# Patient Record
Sex: FEMALE | Race: WHITE | Employment: UNEMPLOYED | URBAN - METROPOLITAN AREA
[De-identification: names, ages, dates, MRNs, and addresses within clinical notes are randomized per-mention and may not be internally consistent; named-entity substitution may affect disease eponyms.]

---

## 2017-01-05 RX ORDER — CITALOPRAM 20 MG/1
TABLET ORAL
Qty: 90 TABLET | Refills: 0 | Status: SHIPPED | OUTPATIENT
Start: 2017-01-05 | End: 2017-04-05

## 2017-02-20 RX ORDER — HYDROCHLOROTHIAZIDE 25 MG/1
TABLET ORAL
Qty: 90 TABLET | Refills: 0 | Status: SHIPPED | OUTPATIENT
Start: 2017-02-20 | End: 2017-05-19

## 2017-02-22 ENCOUNTER — APPOINTMENT (OUTPATIENT)
Dept: LAB | Age: 57
End: 2017-02-22
Attending: FAMILY MEDICINE
Payer: COMMERCIAL

## 2017-02-22 DIAGNOSIS — Z00.00 BLOOD TESTS FOR ROUTINE GENERAL PHYSICAL EXAMINATION: ICD-10-CM

## 2017-02-22 LAB
25-HYDROXYVITAMIN D (TOTAL): 35.1 NG/ML (ref 30–100)
ALBUMIN SERPL-MCNC: 4 G/DL (ref 3.5–4.8)
ALP LIVER SERPL-CCNC: 63 U/L (ref 41–108)
ALT SERPL-CCNC: 42 U/L (ref 14–54)
AST SERPL-CCNC: 24 U/L (ref 15–41)
BILIRUB SERPL-MCNC: 0.6 MG/DL (ref 0.1–2)
BUN BLD-MCNC: 16 MG/DL (ref 8–20)
CALCIUM BLD-MCNC: 9 MG/DL (ref 8.3–10.3)
CHLORIDE: 104 MMOL/L (ref 101–111)
CHOLEST SMN-MCNC: 199 MG/DL (ref ?–200)
CO2: 27 MMOL/L (ref 22–32)
CREAT BLD-MCNC: 0.52 MG/DL (ref 0.55–1.02)
ERYTHROCYTE [DISTWIDTH] IN BLOOD BY AUTOMATED COUNT: 12.2 % (ref 11.5–16)
GLUCOSE BLD-MCNC: 85 MG/DL (ref 70–99)
HCT VFR BLD AUTO: 39.7 % (ref 34–50)
HDLC SERPL-MCNC: 69 MG/DL (ref 45–?)
HDLC SERPL: 2.88 {RATIO} (ref ?–4.44)
HGB BLD-MCNC: 13.5 G/DL (ref 12–16)
LDLC SERPL CALC-MCNC: 115 MG/DL (ref ?–130)
M PROTEIN MFR SERPL ELPH: 7.3 G/DL (ref 6.1–8.3)
MCH RBC QN AUTO: 32.1 PG (ref 27–33.2)
MCHC RBC AUTO-ENTMCNC: 34 G/DL (ref 31–37)
MCV RBC AUTO: 94.5 FL (ref 81–100)
NONHDLC SERPL-MCNC: 130 MG/DL (ref ?–130)
PLATELET # BLD AUTO: 206 10(3)UL (ref 150–450)
POTASSIUM SERPL-SCNC: 3.8 MMOL/L (ref 3.6–5.1)
RBC # BLD AUTO: 4.2 X10(6)UL (ref 3.8–5.1)
RED CELL DISTRIBUTION WIDTH-SD: 42.5 FL (ref 35.1–46.3)
SODIUM SERPL-SCNC: 140 MMOL/L (ref 136–144)
TRIGLYCERIDES: 76 MG/DL (ref ?–150)
TSI SER-ACNC: 2.97 MIU/ML (ref 0.35–5.5)
VLDL: 15 MG/DL (ref 5–40)
WBC # BLD AUTO: 4.6 X10(3) UL (ref 4–13)

## 2017-02-22 PROCEDURE — 36415 COLL VENOUS BLD VENIPUNCTURE: CPT

## 2017-02-22 PROCEDURE — 80053 COMPREHEN METABOLIC PANEL: CPT

## 2017-02-22 PROCEDURE — 82306 VITAMIN D 25 HYDROXY: CPT

## 2017-02-22 PROCEDURE — 85027 COMPLETE CBC AUTOMATED: CPT

## 2017-02-22 PROCEDURE — 80061 LIPID PANEL: CPT

## 2017-02-22 PROCEDURE — 84443 ASSAY THYROID STIM HORMONE: CPT

## 2017-04-06 RX ORDER — CITALOPRAM 20 MG/1
TABLET ORAL
Qty: 90 TABLET | Refills: 0 | Status: SHIPPED | OUTPATIENT
Start: 2017-04-06 | End: 2017-07-07

## 2017-04-27 ENCOUNTER — TELEPHONE (OUTPATIENT)
Dept: FAMILY MEDICINE CLINIC | Facility: CLINIC | Age: 57
End: 2017-04-27

## 2017-05-07 ENCOUNTER — PATIENT MESSAGE (OUTPATIENT)
Dept: FAMILY MEDICINE CLINIC | Facility: CLINIC | Age: 57
End: 2017-05-07

## 2017-05-19 RX ORDER — HYDROCHLOROTHIAZIDE 25 MG/1
TABLET ORAL
Qty: 90 TABLET | Refills: 0 | Status: SHIPPED | OUTPATIENT
Start: 2017-05-19 | End: 2017-08-04

## 2017-07-13 RX ORDER — CITALOPRAM 20 MG/1
TABLET ORAL
Qty: 90 TABLET | Refills: 0 | Status: SHIPPED | OUTPATIENT
Start: 2017-07-13 | End: 2017-11-20

## 2017-07-21 DIAGNOSIS — I10 BENIGN ESSENTIAL HTN: ICD-10-CM

## 2017-07-21 RX ORDER — LISINOPRIL 5 MG/1
5 TABLET ORAL DAILY
Qty: 90 TABLET | Refills: 0 | Status: SHIPPED | OUTPATIENT
Start: 2017-07-21 | End: 2017-08-04

## 2017-08-04 ENCOUNTER — OFFICE VISIT (OUTPATIENT)
Dept: FAMILY MEDICINE CLINIC | Facility: CLINIC | Age: 57
End: 2017-08-04

## 2017-08-04 VITALS
WEIGHT: 230 LBS | HEIGHT: 67 IN | DIASTOLIC BLOOD PRESSURE: 84 MMHG | TEMPERATURE: 99 F | SYSTOLIC BLOOD PRESSURE: 134 MMHG | HEART RATE: 60 BPM | RESPIRATION RATE: 12 BRPM | BODY MASS INDEX: 36.1 KG/M2

## 2017-08-04 DIAGNOSIS — I10 BENIGN ESSENTIAL HTN: Primary | ICD-10-CM

## 2017-08-04 DIAGNOSIS — F32.A DEPRESSION, UNSPECIFIED DEPRESSION TYPE: ICD-10-CM

## 2017-08-04 DIAGNOSIS — F41.1 GENERALIZED ANXIETY DISORDER: ICD-10-CM

## 2017-08-04 PROCEDURE — 99213 OFFICE O/P EST LOW 20 MIN: CPT | Performed by: FAMILY MEDICINE

## 2017-08-04 RX ORDER — HYDROCHLOROTHIAZIDE 25 MG/1
25 TABLET ORAL
Qty: 90 TABLET | Refills: 1 | Status: SHIPPED | OUTPATIENT
Start: 2017-08-04 | End: 2017-11-20

## 2017-08-04 RX ORDER — CITALOPRAM 20 MG/1
20 TABLET ORAL
Qty: 90 TABLET | Refills: 1 | Status: SHIPPED | OUTPATIENT
Start: 2017-08-04 | End: 2017-11-20

## 2017-08-04 RX ORDER — LISINOPRIL 10 MG/1
10 TABLET ORAL DAILY
Qty: 90 TABLET | Refills: 1 | Status: SHIPPED | OUTPATIENT
Start: 2017-08-04 | End: 2017-11-20

## 2017-08-04 NOTE — PROGRESS NOTES
CHIEF COMPLAINT:   Marciana Buerger a 49year old female is here for followup on HTN  HPI:   Marciana Buerger has been doing well since the last visit here. Marciana Buerger  is compliant with hypertensive medication. No chest pain. No dyspnea.

## 2017-11-20 ENCOUNTER — OFFICE VISIT (OUTPATIENT)
Dept: FAMILY MEDICINE CLINIC | Facility: CLINIC | Age: 57
End: 2017-11-20

## 2017-11-20 VITALS
HEIGHT: 67 IN | RESPIRATION RATE: 16 BRPM | TEMPERATURE: 98 F | DIASTOLIC BLOOD PRESSURE: 90 MMHG | SYSTOLIC BLOOD PRESSURE: 154 MMHG | WEIGHT: 238 LBS | BODY MASS INDEX: 37.35 KG/M2 | HEART RATE: 64 BPM

## 2017-11-20 DIAGNOSIS — F41.1 GENERALIZED ANXIETY DISORDER: ICD-10-CM

## 2017-11-20 DIAGNOSIS — Z00.00 BLOOD TESTS FOR ROUTINE GENERAL PHYSICAL EXAMINATION: ICD-10-CM

## 2017-11-20 DIAGNOSIS — I10 BENIGN ESSENTIAL HTN: Primary | ICD-10-CM

## 2017-11-20 DIAGNOSIS — F32.A DEPRESSION, UNSPECIFIED DEPRESSION TYPE: ICD-10-CM

## 2017-11-20 PROCEDURE — 99214 OFFICE O/P EST MOD 30 MIN: CPT | Performed by: FAMILY MEDICINE

## 2017-11-20 RX ORDER — LISINOPRIL AND HYDROCHLOROTHIAZIDE 25; 20 MG/1; MG/1
1 TABLET ORAL DAILY
Qty: 90 TABLET | Refills: 0 | Status: SHIPPED | OUTPATIENT
Start: 2017-11-20 | End: 2018-02-06

## 2017-11-20 RX ORDER — CITALOPRAM 20 MG/1
20 TABLET ORAL
Qty: 90 TABLET | Refills: 1 | Status: SHIPPED | OUTPATIENT
Start: 2017-11-20 | End: 2018-10-20

## 2017-11-20 NOTE — PROGRESS NOTES
CHIEF COMPLAINT:   Samantha Kapadia a 56year old female is here for followup on HTN  HPI:   Samantha Kapadia has been doing well since the last visit here. Samantha Kapadia  is compliant with hypertensive medication. No chest pain. No dyspnea. daily. Increase lisinopril to 20mg - continue HCTZ 25 mg daily. Check labs. Follow up in 3 months.

## 2018-02-03 ENCOUNTER — TELEPHONE (OUTPATIENT)
Dept: FAMILY MEDICINE CLINIC | Facility: CLINIC | Age: 58
End: 2018-02-03

## 2018-02-06 RX ORDER — HYDROCHLOROTHIAZIDE 25 MG/1
25 TABLET ORAL
Qty: 90 TABLET | Refills: 0 | Status: SHIPPED | OUTPATIENT
Start: 2018-02-06 | End: 2018-02-15 | Stop reason: DRUGHIGH

## 2018-02-15 ENCOUNTER — OFFICE VISIT (OUTPATIENT)
Dept: FAMILY MEDICINE CLINIC | Facility: CLINIC | Age: 58
End: 2018-02-15

## 2018-02-15 VITALS
HEIGHT: 67.5 IN | SYSTOLIC BLOOD PRESSURE: 140 MMHG | OXYGEN SATURATION: 99 % | TEMPERATURE: 99 F | HEART RATE: 55 BPM | BODY MASS INDEX: 37.77 KG/M2 | RESPIRATION RATE: 16 BRPM | DIASTOLIC BLOOD PRESSURE: 80 MMHG | WEIGHT: 243.5 LBS

## 2018-02-15 DIAGNOSIS — I10 BENIGN ESSENTIAL HTN: Primary | ICD-10-CM

## 2018-02-15 PROCEDURE — 99213 OFFICE O/P EST LOW 20 MIN: CPT | Performed by: FAMILY MEDICINE

## 2018-02-15 RX ORDER — SENNOSIDES 8.6 MG
650 CAPSULE ORAL EVERY 8 HOURS PRN
COMMUNITY

## 2018-02-15 RX ORDER — LISINOPRIL 20 MG/1
20 TABLET ORAL DAILY
Qty: 90 TABLET | Refills: 1 | Status: SHIPPED | OUTPATIENT
Start: 2018-02-15 | End: 2018-08-15

## 2018-02-15 RX ORDER — LISINOPRIL AND HYDROCHLOROTHIAZIDE 25; 20 MG/1; MG/1
1 TABLET ORAL DAILY
COMMUNITY
End: 2018-02-15

## 2018-02-15 RX ORDER — LISINOPRIL AND HYDROCHLOROTHIAZIDE 25; 20 MG/1; MG/1
1 TABLET ORAL DAILY
Qty: 90 TABLET | Refills: 1 | Status: SHIPPED | OUTPATIENT
Start: 2018-02-15 | End: 2018-08-15

## 2018-02-15 NOTE — PROGRESS NOTES
CHIEF COMPLAINT:   Odalys Bolton a 56year old female is here for followup on HTN  HPI:   Odalys Bolton has been doing well since the last visit here. Odalys Bolton  is compliant with hypertensive medication. No chest pain. No dyspnea.

## 2018-03-29 ENCOUNTER — TELEPHONE (OUTPATIENT)
Dept: FAMILY MEDICINE CLINIC | Facility: CLINIC | Age: 58
End: 2018-03-29

## 2018-03-29 NOTE — TELEPHONE ENCOUNTER
See note below,pt states she has nasal congestion, chills, cough and low grade temp x 48 hrs. Please advise.

## 2018-03-29 NOTE — TELEPHONE ENCOUNTER
Likely viral.  Would suggest she push fluids. Tylenol is okay. Could try Flonase OTC 2 sprays each nostril daily.

## 2018-03-29 NOTE — TELEPHONE ENCOUNTER
Pt would like a call back from a nurse. She is wondering if she has a cold or the flu. Advised Pt she should be seen to determine but declined. She would just like to speak to a nurse. Please advise. Thank you.

## 2018-08-22 NOTE — TELEPHONE ENCOUNTER
LVM on unidentified voice mail box. Is patient taking lisinopril hydrochlorothiazide 20-25 mg or plain lisinopril 20 mg ?

## 2018-08-23 ENCOUNTER — TELEPHONE (OUTPATIENT)
Dept: FAMILY MEDICINE CLINIC | Facility: CLINIC | Age: 58
End: 2018-08-23

## 2018-08-23 DIAGNOSIS — Z12.31 ENCOUNTER FOR SCREENING MAMMOGRAM FOR BREAST CANCER: Primary | ICD-10-CM

## 2018-08-23 RX ORDER — LISINOPRIL AND HYDROCHLOROTHIAZIDE 25; 20 MG/1; MG/1
1 TABLET ORAL DAILY
Qty: 90 TABLET | Refills: 0 | Status: SHIPPED | OUTPATIENT
Start: 2018-08-23 | End: 2018-11-19

## 2018-08-23 RX ORDER — LISINOPRIL 20 MG/1
20 TABLET ORAL DAILY
Qty: 90 TABLET | Refills: 0 | Status: SHIPPED | OUTPATIENT
Start: 2018-08-23 | End: 2018-11-19

## 2018-08-23 NOTE — TELEPHONE ENCOUNTER
Pt aware of mammo order, is aware that she needs a breast exam.  She states she will have Lange Links do this during her visit in Sept.

## 2018-08-23 NOTE — TELEPHONE ENCOUNTER
Okay to order mammogram.  If I have any slots open please change her appointment to my schedule.   Otherwise Lange Ricki can do a breast exam on the patient which comes in for her blood pressure check

## 2018-08-23 NOTE — TELEPHONE ENCOUNTER
See note below, pt states you order her mammo test. Last mammo done 2016 with US left breast bx. What type of mammo to order now and do you want to see pt first for a breast exam first? Pt seeing ARETMIO Pendleton for BP check 9/14/18 as she states she could not

## 2018-08-23 NOTE — TELEPHONE ENCOUNTER
Pt called back to clarify. She is taking both Lisinopril-Hydrochlorothiazide 20-25 MG Oral Tab and lisinopril 20 MG Oral Tab. Pt is out of lisinopril 20mg.

## 2018-09-14 ENCOUNTER — OFFICE VISIT (OUTPATIENT)
Dept: FAMILY MEDICINE CLINIC | Facility: CLINIC | Age: 58
End: 2018-09-14
Payer: COMMERCIAL

## 2018-09-14 VITALS
TEMPERATURE: 99 F | SYSTOLIC BLOOD PRESSURE: 154 MMHG | BODY MASS INDEX: 38.45 KG/M2 | HEIGHT: 67 IN | DIASTOLIC BLOOD PRESSURE: 84 MMHG | RESPIRATION RATE: 12 BRPM | WEIGHT: 245 LBS | HEART RATE: 72 BPM

## 2018-09-14 DIAGNOSIS — I10 BENIGN ESSENTIAL HTN: Primary | ICD-10-CM

## 2018-09-14 PROCEDURE — 99213 OFFICE O/P EST LOW 20 MIN: CPT | Performed by: FAMILY MEDICINE

## 2018-09-14 RX ORDER — AMLODIPINE BESYLATE 2.5 MG/1
2.5 TABLET ORAL DAILY
Qty: 90 TABLET | Refills: 0 | Status: SHIPPED | OUTPATIENT
Start: 2018-09-14 | End: 2018-11-19

## 2018-09-14 NOTE — PATIENT INSTRUCTIONS
Check on insurance coverage for Shingrix. If covered, then ask your insurance if you should do it at the [address] [place] [address] office or pharmacy.

## 2018-09-14 NOTE — PROGRESS NOTES
CHIEF COMPLAINT:   Mike Weiss a 56year old female is here for followup on HTN  HPI:   Mike Weiss has been doing well since the last visit here. Marisandoval Rodneysteven  is compliant with hypertensive medication. No chest pain. No dyspnea.

## 2018-09-17 ENCOUNTER — HOSPITAL ENCOUNTER (OUTPATIENT)
Dept: MAMMOGRAPHY | Facility: HOSPITAL | Age: 58
Discharge: HOME | End: 2018-09-17
Attending: FAMILY MEDICINE
Payer: COMMERCIAL

## 2018-09-17 DIAGNOSIS — Z12.31 ENCOUNTER FOR SCREENING MAMMOGRAM FOR BREAST CANCER: ICD-10-CM

## 2018-09-17 PROCEDURE — 77063 BREAST TOMOSYNTHESIS BI: CPT | Performed by: FAMILY MEDICINE

## 2018-09-17 PROCEDURE — 77067 SCR MAMMO BI INCL CAD: CPT | Performed by: FAMILY MEDICINE

## 2018-10-22 RX ORDER — CITALOPRAM 20 MG/1
20 TABLET ORAL
Qty: 90 TABLET | Refills: 1 | Status: SHIPPED | OUTPATIENT
Start: 2018-10-22 | End: 2019-03-28

## 2018-11-19 ENCOUNTER — OFFICE VISIT (OUTPATIENT)
Dept: FAMILY MEDICINE CLINIC | Facility: CLINIC | Age: 58
End: 2018-11-19
Payer: COMMERCIAL

## 2018-11-19 VITALS
BODY MASS INDEX: 38.77 KG/M2 | WEIGHT: 247 LBS | SYSTOLIC BLOOD PRESSURE: 114 MMHG | DIASTOLIC BLOOD PRESSURE: 80 MMHG | HEART RATE: 72 BPM | TEMPERATURE: 97 F | RESPIRATION RATE: 14 BRPM | HEIGHT: 67 IN

## 2018-11-19 DIAGNOSIS — F41.8 SITUATIONAL ANXIETY: ICD-10-CM

## 2018-11-19 DIAGNOSIS — I10 BENIGN ESSENTIAL HTN: Primary | ICD-10-CM

## 2018-11-19 PROCEDURE — 99213 OFFICE O/P EST LOW 20 MIN: CPT | Performed by: FAMILY MEDICINE

## 2018-11-19 RX ORDER — AMLODIPINE BESYLATE 2.5 MG/1
2.5 TABLET ORAL DAILY
Qty: 90 TABLET | Refills: 1 | Status: SHIPPED | OUTPATIENT
Start: 2018-11-19 | End: 2019-04-01 | Stop reason: DRUGHIGH

## 2018-11-19 RX ORDER — LISINOPRIL AND HYDROCHLOROTHIAZIDE 25; 20 MG/1; MG/1
1 TABLET ORAL DAILY
Qty: 90 TABLET | Refills: 0 | Status: CANCELLED | OUTPATIENT
Start: 2018-11-19

## 2018-11-19 RX ORDER — LISINOPRIL 20 MG/1
20 TABLET ORAL DAILY
Qty: 90 TABLET | Refills: 1 | Status: SHIPPED | OUTPATIENT
Start: 2018-11-19 | End: 2019-06-27

## 2018-11-19 RX ORDER — LISINOPRIL AND HYDROCHLOROTHIAZIDE 25; 20 MG/1; MG/1
1 TABLET ORAL DAILY
Qty: 90 TABLET | Refills: 1 | Status: SHIPPED | OUTPATIENT
Start: 2018-11-19 | End: 2019-06-27

## 2018-11-19 RX ORDER — ALPRAZOLAM 0.25 MG/1
TABLET ORAL
Qty: 30 TABLET | Refills: 0 | Status: SHIPPED | OUTPATIENT
Start: 2018-11-19 | End: 2019-04-01

## 2018-11-19 RX ORDER — LISINOPRIL 20 MG/1
20 TABLET ORAL DAILY
Qty: 90 TABLET | Refills: 0 | Status: CANCELLED | OUTPATIENT
Start: 2018-11-19

## 2018-11-19 NOTE — PROGRESS NOTES
CHIEF COMPLAINT:   Laurie Pendleton a 66year old female is here for followup on HTN  HPI:   Laurie Pendleton has been doing well since the last visit here. Laurie Pendleton  is compliant with hypertensive medication. No chest pain. No dyspnea.

## 2018-12-12 ENCOUNTER — TELEPHONE (OUTPATIENT)
Dept: FAMILY MEDICINE CLINIC | Facility: CLINIC | Age: 58
End: 2018-12-12

## 2018-12-12 DIAGNOSIS — Z00.00 BLOOD TESTS FOR ROUTINE GENERAL PHYSICAL EXAMINATION: Primary | ICD-10-CM

## 2018-12-12 NOTE — TELEPHONE ENCOUNTER
[facility] called to ask that orders from 17 be reentered b/c they are . Pt is coming in tomorrow zandern to have labs done.

## 2018-12-13 ENCOUNTER — LABORATORY ENCOUNTER (OUTPATIENT)
Dept: LAB | Age: 58
End: 2018-12-13
Attending: FAMILY MEDICINE
Payer: COMMERCIAL

## 2018-12-13 DIAGNOSIS — Z00.00 BLOOD TESTS FOR ROUTINE GENERAL PHYSICAL EXAMINATION: ICD-10-CM

## 2018-12-13 PROCEDURE — 82306 VITAMIN D 25 HYDROXY: CPT | Performed by: FAMILY MEDICINE

## 2018-12-13 PROCEDURE — 36415 COLL VENOUS BLD VENIPUNCTURE: CPT | Performed by: FAMILY MEDICINE

## 2018-12-13 PROCEDURE — 80050 GENERAL HEALTH PANEL: CPT | Performed by: FAMILY MEDICINE

## 2018-12-13 PROCEDURE — 80061 LIPID PANEL: CPT | Performed by: FAMILY MEDICINE

## 2019-03-12 ENCOUNTER — APPOINTMENT (OUTPATIENT)
Dept: CT IMAGING | Facility: HOSPITAL | Age: 59
End: 2019-03-12
Attending: EMERGENCY MEDICINE
Payer: COMMERCIAL

## 2019-03-12 ENCOUNTER — HOSPITAL ENCOUNTER (EMERGENCY)
Facility: HOSPITAL | Age: 59
Discharge: HOME | End: 2019-03-12
Attending: EMERGENCY MEDICINE
Payer: COMMERCIAL

## 2019-03-12 VITALS
DIASTOLIC BLOOD PRESSURE: 82 MMHG | BODY MASS INDEX: 38 KG/M2 | HEART RATE: 81 BPM | OXYGEN SATURATION: 96 % | WEIGHT: 240 LBS | RESPIRATION RATE: 16 BRPM | SYSTOLIC BLOOD PRESSURE: 133 MMHG | TEMPERATURE: 98 F

## 2019-03-12 DIAGNOSIS — R51.9 HEADACHE DISORDER: Primary | ICD-10-CM

## 2019-03-12 LAB
ALBUMIN SERPL-MCNC: 3.7 G/DL (ref 3.4–5)
ALBUMIN/GLOB SERPL: 1.1 {RATIO} (ref 1–2)
ALP LIVER SERPL-CCNC: 71 U/L (ref 46–118)
ALT SERPL-CCNC: 46 U/L (ref 13–56)
ANION GAP SERPL CALC-SCNC: 8 MMOL/L (ref 0–18)
AST SERPL-CCNC: 28 U/L (ref 15–37)
BASOPHILS # BLD AUTO: 0.05 X10(3) UL (ref 0–0.2)
BASOPHILS NFR BLD AUTO: 0.6 %
BILIRUB SERPL-MCNC: 0.7 MG/DL (ref 0.1–2)
BUN BLD-MCNC: 25 MG/DL (ref 7–18)
BUN/CREAT SERPL: 32.1 (ref 10–20)
CALCIUM BLD-MCNC: 9.7 MG/DL (ref 8.5–10.1)
CHLORIDE SERPL-SCNC: 107 MMOL/L (ref 98–107)
CO2 SERPL-SCNC: 24 MMOL/L (ref 21–32)
CREAT BLD-MCNC: 0.78 MG/DL (ref 0.55–1.02)
DEPRECATED RDW RBC AUTO: 40.7 FL (ref 35.1–46.3)
EOSINOPHIL # BLD AUTO: 0.12 X10(3) UL (ref 0–0.7)
EOSINOPHIL NFR BLD AUTO: 1.5 %
ERYTHROCYTE [DISTWIDTH] IN BLOOD BY AUTOMATED COUNT: 11.9 % (ref 11–15)
GLOBULIN PLAS-MCNC: 3.5 G/DL (ref 2.8–4.4)
GLUCOSE BLD-MCNC: 141 MG/DL (ref 70–99)
HCT VFR BLD AUTO: 38.6 % (ref 35–48)
HGB BLD-MCNC: 13 G/DL (ref 12–16)
IMM GRANULOCYTES # BLD AUTO: 0.02 X10(3) UL (ref 0–1)
IMM GRANULOCYTES NFR BLD: 0.3 %
LIPASE SERPL-CCNC: 186 U/L (ref 73–393)
LYMPHOCYTES # BLD AUTO: 3.41 X10(3) UL (ref 1–4)
LYMPHOCYTES NFR BLD AUTO: 43 %
M PROTEIN MFR SERPL ELPH: 7.2 G/DL (ref 6.4–8.2)
MCH RBC QN AUTO: 31.3 PG (ref 26–34)
MCHC RBC AUTO-ENTMCNC: 33.7 G/DL (ref 31–37)
MCV RBC AUTO: 92.8 FL (ref 80–100)
MONOCYTES # BLD AUTO: 0.65 X10(3) UL (ref 0.1–1)
MONOCYTES NFR BLD AUTO: 8.2 %
NEUTROPHILS # BLD AUTO: 3.68 X10 (3) UL (ref 1.5–7.7)
NEUTROPHILS # BLD AUTO: 3.68 X10(3) UL (ref 1.5–7.7)
NEUTROPHILS NFR BLD AUTO: 46.4 %
OSMOLALITY SERPL CALC.SUM OF ELEC: 295 MOSM/KG (ref 275–295)
PLATELET # BLD AUTO: 258 10(3)UL (ref 150–450)
POTASSIUM SERPL-SCNC: 3.5 MMOL/L (ref 3.5–5.1)
RBC # BLD AUTO: 4.16 X10(6)UL (ref 3.8–5.3)
SODIUM SERPL-SCNC: 139 MMOL/L (ref 136–145)
WBC # BLD AUTO: 7.9 X10(3) UL (ref 4–11)

## 2019-03-12 PROCEDURE — 85025 COMPLETE CBC W/AUTO DIFF WBC: CPT

## 2019-03-12 PROCEDURE — 96361 HYDRATE IV INFUSION ADD-ON: CPT

## 2019-03-12 PROCEDURE — 70496 CT ANGIOGRAPHY HEAD: CPT | Performed by: EMERGENCY MEDICINE

## 2019-03-12 PROCEDURE — 99285 EMERGENCY DEPT VISIT HI MDM: CPT

## 2019-03-12 PROCEDURE — 80053 COMPREHEN METABOLIC PANEL: CPT

## 2019-03-12 PROCEDURE — 93010 ELECTROCARDIOGRAM REPORT: CPT

## 2019-03-12 PROCEDURE — 83690 ASSAY OF LIPASE: CPT

## 2019-03-12 PROCEDURE — 96374 THER/PROPH/DIAG INJ IV PUSH: CPT

## 2019-03-12 PROCEDURE — 93005 ELECTROCARDIOGRAM TRACING: CPT

## 2019-03-12 PROCEDURE — 96375 TX/PRO/DX INJ NEW DRUG ADDON: CPT

## 2019-03-12 RX ORDER — DIPHENHYDRAMINE HYDROCHLORIDE 50 MG/ML
25 INJECTION INTRAMUSCULAR; INTRAVENOUS ONCE
Status: COMPLETED | OUTPATIENT
Start: 2019-03-12 | End: 2019-03-12

## 2019-03-12 RX ORDER — MORPHINE SULFATE 4 MG/ML
4 INJECTION, SOLUTION INTRAMUSCULAR; INTRAVENOUS EVERY 30 MIN PRN
Status: DISCONTINUED | OUTPATIENT
Start: 2019-03-12 | End: 2019-03-13

## 2019-03-12 RX ORDER — ONDANSETRON 2 MG/ML
4 INJECTION INTRAMUSCULAR; INTRAVENOUS ONCE
Status: COMPLETED | OUTPATIENT
Start: 2019-03-12 | End: 2019-03-12

## 2019-03-12 RX ORDER — METOCLOPRAMIDE 10 MG/1
10 TABLET ORAL 3 TIMES DAILY PRN
Qty: 20 TABLET | Refills: 0 | Status: SHIPPED | OUTPATIENT
Start: 2019-03-12 | End: 2019-04-11

## 2019-03-12 RX ORDER — METOCLOPRAMIDE HYDROCHLORIDE 5 MG/ML
10 INJECTION INTRAMUSCULAR; INTRAVENOUS ONCE
Status: COMPLETED | OUTPATIENT
Start: 2019-03-12 | End: 2019-03-12

## 2019-03-13 NOTE — ED NOTES
Patient ambulatory with a steady gait for discharge. Verbalized understanding of discharge instructions.

## 2019-03-13 NOTE — ED PROVIDER NOTES
Patient Seen in: [facility] Emergency Department    History   Patient presents with:  Headache (neurologic)    Stated Complaint: HA/VOMITING     HPI    This is a 61-year-old female who arrives here with complaints of headache she described as sudden is in no respiratory distress. The patient is not septic or toxic    HEENT: Atraumatic, conjunctiva are not pale. There is no icterus. Oral mucosa Is wet. No facial trauma. The neck is supple.     LUNGS: Clear to auscultation, there is no wheezing or re INDICATIONS:  HA/VOMITING  TECHNIQUE:  Unenhanced followed by contrast enhanced multislice CT angiography of the brain vasculature using non-ionic contrast. Multiplanar 3D reformatted imaging as well as multiplanar MIP images were obtained.  Dose reduction she had gotten small dose of morphine, she was still nauseous therefore Reglan and Benadryl was given when I went back and reexamined her she has no headache she has no neck stiffness her cranial nerves are grossly intact her muscle strength is grossly int Danielle Garcia [address] [place] [address]          Sanford Gibbs MD  [address] [phone]              Medications Prescribed:  Current Discharge Medication List    START taking these medications    Metoclopramide HCl 10 MG Oral Ta

## 2019-03-13 NOTE — ED INITIAL ASSESSMENT (HPI)
Pt here via EMS with c/o headache and vomiting, sudden onset tonight while at [employer]. Denies any head injury or trauma. Denies history of migraines. Patient did receive 4 mg zofran ODT.

## 2019-03-14 LAB
ATRIAL RATE: 80 BPM
P AXIS: 52 DEGREES
P-R INTERVAL: 180 MS
Q-T INTERVAL: 408 MS
QRS DURATION: 96 MS
QTC CALCULATION (BEZET): 470 MS
R AXIS: -9 DEGREES
T AXIS: 55 DEGREES
VENTRICULAR RATE: 80 BPM

## 2019-03-18 ENCOUNTER — APPOINTMENT (OUTPATIENT)
Dept: LAB | Age: 59
End: 2019-03-18
Attending: FAMILY MEDICINE
Payer: COMMERCIAL

## 2019-03-18 ENCOUNTER — OFFICE VISIT (OUTPATIENT)
Dept: FAMILY MEDICINE CLINIC | Facility: CLINIC | Age: 59
End: 2019-03-18
Payer: COMMERCIAL

## 2019-03-18 VITALS
HEART RATE: 64 BPM | SYSTOLIC BLOOD PRESSURE: 158 MMHG | DIASTOLIC BLOOD PRESSURE: 90 MMHG | HEIGHT: 67 IN | WEIGHT: 253 LBS | RESPIRATION RATE: 12 BRPM | BODY MASS INDEX: 39.71 KG/M2

## 2019-03-18 DIAGNOSIS — I49.3 PVC'S (PREMATURE VENTRICULAR CONTRACTIONS): ICD-10-CM

## 2019-03-18 DIAGNOSIS — R94.31 ABNORMAL EKG: ICD-10-CM

## 2019-03-18 DIAGNOSIS — I10 UNCONTROLLED HYPERTENSION: ICD-10-CM

## 2019-03-18 DIAGNOSIS — I10 HYPERTENSION NOT AT GOAL: ICD-10-CM

## 2019-03-18 DIAGNOSIS — R01.1 SYSTOLIC MURMUR: ICD-10-CM

## 2019-03-18 DIAGNOSIS — R51.9 ACUTE NONINTRACTABLE HEADACHE, UNSPECIFIED HEADACHE TYPE: ICD-10-CM

## 2019-03-18 DIAGNOSIS — I10 BENIGN ESSENTIAL HTN: Primary | ICD-10-CM

## 2019-03-18 PROCEDURE — 82088 ASSAY OF ALDOSTERONE: CPT | Performed by: FAMILY MEDICINE

## 2019-03-18 PROCEDURE — 99214 OFFICE O/P EST MOD 30 MIN: CPT | Performed by: FAMILY MEDICINE

## 2019-03-18 PROCEDURE — 84244 ASSAY OF RENIN: CPT | Performed by: FAMILY MEDICINE

## 2019-03-18 PROCEDURE — 36415 COLL VENOUS BLD VENIPUNCTURE: CPT | Performed by: FAMILY MEDICINE

## 2019-03-18 PROCEDURE — 83835 ASSAY OF METANEPHRINES: CPT | Performed by: FAMILY MEDICINE

## 2019-03-18 RX ORDER — AMLODIPINE BESYLATE 5 MG/1
7.5 TABLET ORAL DAILY
Qty: 135 TABLET | Refills: 0 | Status: SHIPPED | OUTPATIENT
Start: 2019-03-18 | End: 2019-06-10

## 2019-03-18 NOTE — PROGRESS NOTES
CHIEF COMPLAINT:   Laurie Pendleton a 66year old female is here for followup on HTN/ER follow up  HPI:   Laurie Pendleton has been doing well since the last visit here. Laurie Pendleton  is compliant with hypertensive medication. No chest pain. KIDNEY W DOPPLER (WDI=80443/02572)  CARD MONITOR HOLTER 24 HOUR (CPT=93225)  CARD ECHO 2D DOPPLER (CPT=93306)   Reviewed ER work up - CTA brain unremarkable. PVCs on EKG. Also question of septal infarct.   Patient reports she was in [place] when she prese

## 2019-03-21 LAB
ALDOSTERONE/RENIN ACTIVITY RATIO: 5.4 RATIO
ALDOSTERONE: 8.7 NG/DL
RENIN ACTIVITY: 1.6 NG/ML/HR

## 2019-03-22 LAB
METANEPHRINE: <0.1 NMOL/L
NORMETANEPHRINE: 0.35 NMOL/L

## 2019-03-25 ENCOUNTER — HOSPITAL ENCOUNTER (OUTPATIENT)
Dept: CV DIAGNOSTICS | Facility: HOSPITAL | Age: 59
Discharge: HOME | End: 2019-03-25
Attending: FAMILY MEDICINE
Payer: COMMERCIAL

## 2019-03-25 DIAGNOSIS — I49.3 PVC'S (PREMATURE VENTRICULAR CONTRACTIONS): ICD-10-CM

## 2019-03-25 DIAGNOSIS — I10 UNCONTROLLED HYPERTENSION: ICD-10-CM

## 2019-03-25 DIAGNOSIS — R01.1 SYSTOLIC MURMUR: ICD-10-CM

## 2019-03-25 PROCEDURE — 93226 XTRNL ECG REC<48 HR SCAN A/R: CPT | Performed by: FAMILY MEDICINE

## 2019-03-25 PROCEDURE — 93306 TTE W/DOPPLER COMPLETE: CPT | Performed by: FAMILY MEDICINE

## 2019-03-25 PROCEDURE — 93227 XTRNL ECG REC<48 HR R&I: CPT | Performed by: FAMILY MEDICINE

## 2019-03-25 PROCEDURE — 93225 XTRNL ECG REC<48 HRS REC: CPT | Performed by: FAMILY MEDICINE

## 2019-03-27 ENCOUNTER — TELEPHONE (OUTPATIENT)
Dept: FAMILY MEDICINE CLINIC | Facility: CLINIC | Age: 59
End: 2019-03-27

## 2019-03-27 ENCOUNTER — HOSPITAL ENCOUNTER (OUTPATIENT)
Dept: ULTRASOUND IMAGING | Age: 59
Discharge: HOME | End: 2019-03-27
Attending: FAMILY MEDICINE
Payer: COMMERCIAL

## 2019-03-27 DIAGNOSIS — I10 UNCONTROLLED HYPERTENSION: ICD-10-CM

## 2019-03-27 PROCEDURE — 76775 US EXAM ABDO BACK WALL LIM: CPT | Performed by: FAMILY MEDICINE

## 2019-03-27 PROCEDURE — 93975 VASCULAR STUDY: CPT | Performed by: FAMILY MEDICINE

## 2019-03-27 NOTE — TELEPHONE ENCOUNTER
Pt states 3 weeks ago she was in the hospital. Pt states ever since then she has had a lot of anxiety and has been using her xanax a lot more then she normally did.  She used to only need to take it for flying but she seems like she needs to take it more an

## 2019-03-27 NOTE — TELEPHONE ENCOUNTER
Pt needs appt to discuss this further. She saw dressler 3/18 and I see no mention of this. With her out this week, on call would advise her to be seen. Please offer her appt with dressler when she is back next week.   If pt is unwilling to wait that long

## 2019-03-28 PROBLEM — G44.53 PRIMARY THUNDERCLAP HEADACHE: Status: ACTIVE | Noted: 2019-03-28

## 2019-03-28 NOTE — PROGRESS NOTES
The patient is here for headaches. The patient had a headache on 03/12/19, the patient has had no headaches since this episode.

## 2019-03-28 NOTE — PROGRESS NOTES
[facility]   Neurology; INITIAL CLINIC VISIT  3/28/2019, 10:08 AM     Emmanuelle Baeza Patient Status:  No patient class for patient encounter    10/30/1960 MRN [id number]   Location [facility], [address] [place] [address] Other in her brother and mother. Mother had hepatic aneurysm , brother had AVM, passed away,       SOCIAL HISTORY:   reports that  has never smoked. she has never used smokeless tobacco. She reports that she drinks alcohol.  She reports that she does not or lower extremities  NEURO: see HPI;  PSYCHE: no symptoms of depression or anxiety  HEMATOLOGY:  denies bruising or excessive bleeding  ENDOCRINE: denies excessive thirst or urination; denies unexpected wt gain or wt loss  ALLERGY/IMM.: denies food or sea negative, Tandem walk is normal             IMAGING:  CTA brain:  CONCLUSION:       1. No acute intracranial abnormality identified. 2. No evidence of intracranial aneurysm, flow-limiting stenosis, or focal arterial occlusion.          Dictated by: Christel Dancer

## 2019-04-01 NOTE — PROGRESS NOTES
Samantha Kapadia is a 66year old female. CHIEF COMPLAINT   Follow up on HTN and anxiety  HPI:   BPs at home good.   Dr. Karly Ashby increased citalopram. Dr. Karly Ashby felt headache most likely migraine but checking MRI/MRA also - awaiting approval. No further h GENERAL HEALTH: feels well otherwise  SKIN: denies any unusual skin lesions or rashes  RESPIRATORY: denies shortness of breath with exertion  CARDIOVASCULAR: denies chest pain on exertion  GI: denies abdominal pain and denies heartburn  NEURO: as above

## 2019-04-03 ENCOUNTER — TELEPHONE (OUTPATIENT)
Dept: NEUROLOGY | Facility: CLINIC | Age: 59
End: 2019-04-03

## 2019-04-03 NOTE — TELEPHONE ENCOUNTER
Carotids FTG(49818) Denied     Denial Reason:    Based on [employer] [facility] Guidelines, we cannot approve this request. Your records show that  your doctor suspects that you have a problem with the blood vessels in your neck that supply blood  to your h

## 2019-04-05 ENCOUNTER — TELEPHONE (OUTPATIENT)
Dept: NEUROLOGY | Facility: CLINIC | Age: 59
End: 2019-04-05

## 2019-04-15 NOTE — TELEPHONE ENCOUNTER
Authorizing Provider Encounter Provider   MD Rojas Linares MD   Medication Detail     Medication Quantity Refills Start End   Citalopram Hydrobromide 20 MG Oral Tab 180 tablet 3 3/28/2019    Sig:   Take 2 tablets (40 mg total) by mouth once daily.

## 2019-04-15 NOTE — TELEPHONE ENCOUNTER
I believe the patient's neurologist sent this medication to her pharmacy for 1 year. Please double check with the pharmacy. The neurologist increased it to 40 mg daily.

## 2019-04-17 ENCOUNTER — OFFICE VISIT (OUTPATIENT)
Dept: CARDIOLOGY | Age: 59
End: 2019-04-17

## 2019-04-17 VITALS
BODY MASS INDEX: 37.13 KG/M2 | HEIGHT: 68 IN | HEART RATE: 92 BPM | SYSTOLIC BLOOD PRESSURE: 116 MMHG | DIASTOLIC BLOOD PRESSURE: 82 MMHG | WEIGHT: 245 LBS

## 2019-04-17 DIAGNOSIS — I10 BENIGN ESSENTIAL HTN: ICD-10-CM

## 2019-04-17 DIAGNOSIS — G47.33 OBSTRUCTIVE SLEEP APNEA SYNDROME: ICD-10-CM

## 2019-04-17 DIAGNOSIS — R94.31 ABNORMAL ECG: ICD-10-CM

## 2019-04-17 DIAGNOSIS — R00.2 PALPITATIONS: Primary | ICD-10-CM

## 2019-04-17 PROCEDURE — 3074F SYST BP LT 130 MM HG: CPT | Performed by: INTERNAL MEDICINE

## 2019-04-17 PROCEDURE — 3079F DIAST BP 80-89 MM HG: CPT | Performed by: INTERNAL MEDICINE

## 2019-04-17 PROCEDURE — 99244 OFF/OP CNSLTJ NEW/EST MOD 40: CPT | Performed by: INTERNAL MEDICINE

## 2019-04-17 RX ORDER — LISINOPRIL AND HYDROCHLOROTHIAZIDE 25; 20 MG/1; MG/1
1 TABLET ORAL
COMMUNITY
Start: 2018-11-19

## 2019-04-17 RX ORDER — AMLODIPINE BESYLATE 5 MG/1
7.5 TABLET ORAL
COMMUNITY
Start: 2019-03-18

## 2019-04-17 RX ORDER — LISINOPRIL 20 MG/1
20 TABLET ORAL
COMMUNITY
Start: 2018-11-19

## 2019-04-17 RX ORDER — CITALOPRAM 20 MG/1
40 TABLET ORAL
COMMUNITY
Start: 2019-03-28

## 2019-04-17 RX ORDER — ALPRAZOLAM 0.25 MG/1
TABLET ORAL
Refills: 1 | COMMUNITY
Start: 2019-04-02

## 2019-04-17 ASSESSMENT — ENCOUNTER SYMPTOMS
SNORING: 1
WEIGHT GAIN: 1

## 2019-04-18 RX ORDER — CITALOPRAM 20 MG/1
20 TABLET ORAL
Qty: 90 TABLET | Refills: 1 | OUTPATIENT
Start: 2019-04-18

## 2019-04-19 ENCOUNTER — TELEPHONE (OUTPATIENT)
Dept: NEUROLOGY | Facility: CLINIC | Age: 59
End: 2019-04-19

## 2019-04-22 ENCOUNTER — TELEPHONE (OUTPATIENT)
Dept: NEUROLOGY | Facility: CLINIC | Age: 59
End: 2019-04-22

## 2019-04-22 ENCOUNTER — OFFICE VISIT (OUTPATIENT)
Dept: NEUROLOGY | Facility: CLINIC | Age: 59
End: 2019-04-22
Payer: COMMERCIAL

## 2019-04-22 VITALS
SYSTOLIC BLOOD PRESSURE: 126 MMHG | RESPIRATION RATE: 18 BRPM | DIASTOLIC BLOOD PRESSURE: 72 MMHG | BODY MASS INDEX: 39 KG/M2 | WEIGHT: 246 LBS | HEART RATE: 74 BPM

## 2019-04-22 DIAGNOSIS — G44.53 PRIMARY THUNDERCLAP HEADACHE: Primary | ICD-10-CM

## 2019-04-22 PROCEDURE — 99214 OFFICE O/P EST MOD 30 MIN: CPT | Performed by: OTHER

## 2019-04-22 RX ORDER — METOCLOPRAMIDE 10 MG/1
TABLET ORAL
Qty: 30 TABLET | Refills: 0 | Status: SHIPPED | OUTPATIENT
Start: 2019-04-22 | End: 2021-01-19 | Stop reason: ALTCHOICE

## 2019-04-22 NOTE — PROGRESS NOTES
[facility]   Neurology; follow up CLINIC VISIT  2019    Candice Chaudhary Patient Status:  No patient class for patient encounter    10/30/1960 MRN [id number]   [facility] 12/15/2016    Performed by Almaz Tuttle MD at [facility]   • NEEDLE BIOPSY LEFT         FAMILY HISTORY:  family history includes Other in her brother and mother.    Mother had hepatic aneurysm , brother had AVM, passed away,       SOCIAL HI incontinence  MUSCULOSKELETAL: no joint complaints in  upper or lower extremities  NEURO: see HPI;  PSYCHE: no symptoms of depression or anxiety  HEMATOLOGY:  denies bruising or excessive bleeding  ENDOCRINE: denies excessive thirst or urination; denies un Normal FTN and HTS;   Gait: Normal based,  Romberg sign is negative, Tandem walk is normal             IMAGING:  CTA brain:  CONCLUSION:       1. No acute intracranial abnormality identified.      2. No evidence of intracranial aneurysm, flow-limiting steno [place]  4/22/2019

## 2019-04-22 NOTE — PROGRESS NOTES
The patient is here for a follow-up for headaches. The patient states she no longer has any headaches.

## 2019-04-23 ENCOUNTER — TELEPHONE (OUTPATIENT)
Dept: NEUROLOGY | Facility: CLINIC | Age: 59
End: 2019-04-23

## 2019-04-23 NOTE — TELEPHONE ENCOUNTER
Per Dr Catalino Ríos:  Tell her I reviewed film and report of MRI, MRA brain, normal, incidental findings of sinusitis, need to told to ENT or primary about sinusitis,        Routing comment      Left message on patient identified voicemail (ok with [employer]

## 2019-04-23 NOTE — TELEPHONE ENCOUNTER
Pt brought in CD of results. CD brought to Radiology and downloaded. Pt declined to have CD returned to them.

## 2019-04-24 NOTE — TELEPHONE ENCOUNTER
Called patient and informed her of the below information;    Per Dr Dianelys Farmer:  Tell her I reviewed film and report of MRI, MRA brain, normal, incidental findings of sinusitis, need to told to ENT or primary about sinusitis,             Copy sent to oleksandr

## 2019-04-25 ENCOUNTER — TELEPHONE (OUTPATIENT)
Dept: CARDIOLOGY | Age: 59
End: 2019-04-25

## 2019-04-29 ENCOUNTER — TELEPHONE (OUTPATIENT)
Dept: NEUROLOGY | Facility: CLINIC | Age: 59
End: 2019-04-29

## 2019-05-28 ENCOUNTER — APPOINTMENT (OUTPATIENT)
Dept: SLEEP CENTER | Facility: HOSPITAL | Age: 59
End: 2019-05-28
Attending: INTERNAL MEDICINE
Payer: COMMERCIAL

## 2019-05-29 ENCOUNTER — OFFICE VISIT (OUTPATIENT)
Dept: SLEEP CENTER | Facility: HOSPITAL | Age: 59
End: 2019-05-29
Attending: INTERNAL MEDICINE
Payer: COMMERCIAL

## 2019-05-29 PROCEDURE — 95806 SLEEP STUDY UNATT&RESP EFFT: CPT

## 2019-06-01 NOTE — PROCEDURES
[address] [address],[facility]       Accredited by the [facility] of Sleep Medicine (AASM)    PATIENT'S NAME:        Carlota Johnson  ATTENDING PHYSICIAN:   Gordon Swift M.D.   REFERRING PHYSICIAN:     PATIENT ACCOUNT #: was 4.4, supine index of 7.9 compared to a non-supine index of 2.3, and an SaO2 hilda of 87%. DIAGNOSIS:  Other sleep disorder not due to a substance or known physiological condition (ICD code F51.8). RECOMMENDATIONS:    1.     At the request of the r

## 2019-06-03 RX ORDER — AMLODIPINE BESYLATE 2.5 MG/1
TABLET ORAL
Qty: 90 TABLET | Refills: 1 | OUTPATIENT
Start: 2019-06-03

## 2019-06-10 RX ORDER — AMLODIPINE BESYLATE 5 MG/1
7.5 TABLET ORAL DAILY
Qty: 135 TABLET | Refills: 0 | Status: SHIPPED | OUTPATIENT
Start: 2019-06-10 | End: 2019-09-07

## 2019-06-14 ENCOUNTER — MED REC SCAN ONLY (OUTPATIENT)
Dept: FAMILY MEDICINE CLINIC | Facility: CLINIC | Age: 59
End: 2019-06-14

## 2019-06-27 RX ORDER — LISINOPRIL 20 MG/1
TABLET ORAL
Qty: 90 TABLET | Refills: 0 | Status: SHIPPED | OUTPATIENT
Start: 2019-06-27 | End: 2019-10-02

## 2019-06-27 RX ORDER — LISINOPRIL AND HYDROCHLOROTHIAZIDE 25; 20 MG/1; MG/1
TABLET ORAL
Qty: 90 TABLET | Refills: 0 | Status: SHIPPED | OUTPATIENT
Start: 2019-06-27 | End: 2019-10-02

## 2019-07-24 ENCOUNTER — APPOINTMENT (OUTPATIENT)
Dept: CARDIOLOGY | Age: 59
End: 2019-07-24

## 2019-08-08 ENCOUNTER — OFFICE VISIT (OUTPATIENT)
Dept: FAMILY MEDICINE CLINIC | Facility: CLINIC | Age: 59
End: 2019-08-08
Payer: COMMERCIAL

## 2019-08-08 VITALS
DIASTOLIC BLOOD PRESSURE: 70 MMHG | HEIGHT: 68 IN | WEIGHT: 243 LBS | BODY MASS INDEX: 36.83 KG/M2 | TEMPERATURE: 98 F | RESPIRATION RATE: 12 BRPM | SYSTOLIC BLOOD PRESSURE: 126 MMHG | HEART RATE: 72 BPM

## 2019-08-08 DIAGNOSIS — Z11.51 SCREENING FOR HPV (HUMAN PAPILLOMAVIRUS): ICD-10-CM

## 2019-08-08 DIAGNOSIS — Z00.00 BLOOD TESTS FOR ROUTINE GENERAL PHYSICAL EXAMINATION: ICD-10-CM

## 2019-08-08 DIAGNOSIS — Z13.820 SCREENING FOR OSTEOPOROSIS: ICD-10-CM

## 2019-08-08 DIAGNOSIS — Z12.39 SCREENING FOR BREAST CANCER: ICD-10-CM

## 2019-08-08 DIAGNOSIS — Z13.89 SCREENING FOR GENITOURINARY CONDITION: ICD-10-CM

## 2019-08-08 DIAGNOSIS — E55.9 VITAMIN D DEFICIENCY: ICD-10-CM

## 2019-08-08 DIAGNOSIS — Z00.00 ROUTINE GENERAL MEDICAL EXAMINATION AT A HEALTH CARE FACILITY: Primary | ICD-10-CM

## 2019-08-08 DIAGNOSIS — Z12.4 PAP SMEAR FOR CERVICAL CANCER SCREENING: ICD-10-CM

## 2019-08-08 PROBLEM — G44.53 PRIMARY THUNDERCLAP HEADACHE: Status: RESOLVED | Noted: 2019-03-28 | Resolved: 2019-08-08

## 2019-08-08 PROCEDURE — 99396 PREV VISIT EST AGE 40-64: CPT | Performed by: FAMILY MEDICINE

## 2019-08-08 RX ORDER — MELATONIN
1000 DAILY
COMMUNITY

## 2019-08-08 NOTE — PROGRESS NOTES
CHIEF COMPLAINT       Annual Physical Exam  HPI:   Neida Garrido is a 66year old female who presents for a complete physical exam.   Home BPs good. LEEP done about 1997. Normal pap since then. Last pap 2016.     Wt Readings from Last 6 Encounters: daily.  ) Disp: 180 tablet Rfl: 3   Acetaminophen ER (TYLENOL 8 HOUR ARTHRITIS PAIN) 650 MG Oral Tab CR Take 650 mg by mouth every 8 (eight) hours as needed for Pain.  Two tabs daily Disp:  Rfl:    Multiple Vitamins-Minerals (MULTIVITAMIN OR) Take  by mouth clear  NECK: supple,no adenopathy,no bruits  CHEST: no chest tenderness  BREAST: no dominant or suspicious mass, no nipple discharge  LUNGS: clear to auscultation  CARDIO: RRR without murmur  GI: good BS's,no masses, HSM or tenderness  :introitus is norm

## 2019-08-13 LAB — HPV MRNA E6/E7: NOT DETECTED

## 2019-09-08 RX ORDER — AMLODIPINE BESYLATE 5 MG/1
7.5 TABLET ORAL DAILY
Qty: 135 TABLET | Refills: 0 | Status: SHIPPED | OUTPATIENT
Start: 2019-09-08 | End: 2019-12-12

## 2019-10-02 RX ORDER — LISINOPRIL AND HYDROCHLOROTHIAZIDE 25; 20 MG/1; MG/1
TABLET ORAL
Qty: 90 TABLET | Refills: 0 | Status: SHIPPED | OUTPATIENT
Start: 2019-10-02 | End: 2019-12-30

## 2019-10-02 RX ORDER — LISINOPRIL 20 MG/1
TABLET ORAL
Qty: 90 TABLET | Refills: 0 | Status: SHIPPED | OUTPATIENT
Start: 2019-10-02 | End: 2019-12-30

## 2019-11-01 ENCOUNTER — TELEPHONE (OUTPATIENT)
Dept: FAMILY MEDICINE CLINIC | Facility: CLINIC | Age: 59
End: 2019-11-01

## 2019-11-01 ENCOUNTER — PATIENT MESSAGE (OUTPATIENT)
Dept: FAMILY MEDICINE CLINIC | Facility: CLINIC | Age: 59
End: 2019-11-01

## 2019-11-01 DIAGNOSIS — E78.5 HYPERLIPIDEMIA, UNSPECIFIED HYPERLIPIDEMIA TYPE: Primary | ICD-10-CM

## 2019-11-01 DIAGNOSIS — Z79.899 ON STATIN THERAPY: ICD-10-CM

## 2019-11-01 RX ORDER — ATORVASTATIN CALCIUM 10 MG/1
10 TABLET, FILM COATED ORAL NIGHTLY
Qty: 30 TABLET | Refills: 1 | Status: SHIPPED | OUTPATIENT
Start: 2019-11-01 | End: 2019-11-24

## 2019-11-01 NOTE — TELEPHONE ENCOUNTER
Discussed this message w pt at time of call to her w lab results and nicolle RUBIO comments/recommendations-see that result note.

## 2019-11-25 RX ORDER — ATORVASTATIN CALCIUM 10 MG/1
TABLET, FILM COATED ORAL
Qty: 30 TABLET | Refills: 2 | Status: SHIPPED | OUTPATIENT
Start: 2019-11-25 | End: 2020-03-07

## 2019-12-12 RX ORDER — AMLODIPINE BESYLATE 5 MG/1
7.5 TABLET ORAL DAILY
Qty: 135 TABLET | Refills: 0 | Status: SHIPPED | OUTPATIENT
Start: 2019-12-12 | End: 2020-04-24

## 2019-12-30 RX ORDER — LISINOPRIL AND HYDROCHLOROTHIAZIDE 25; 20 MG/1; MG/1
TABLET ORAL
Qty: 90 TABLET | Refills: 0 | Status: SHIPPED | OUTPATIENT
Start: 2019-12-30 | End: 2020-03-29

## 2019-12-30 RX ORDER — LISINOPRIL 20 MG/1
TABLET ORAL
Qty: 90 TABLET | Refills: 0 | Status: SHIPPED | OUTPATIENT
Start: 2019-12-30 | End: 2020-03-29

## 2020-02-26 NOTE — TELEPHONE ENCOUNTER
Please call pt to schedule anxiety and HTN med check with Jean Pierre Jarquin. LOV: 8/8/19 asked to return in 6 months    Thank you.

## 2020-03-06 NOTE — TELEPHONE ENCOUNTER
Left voicemail for patient to call back to schedule this appointment.      Sent unable to reach letter via my chart

## 2020-03-07 RX ORDER — ATORVASTATIN CALCIUM 10 MG/1
TABLET, FILM COATED ORAL
Qty: 90 TABLET | Refills: 0 | Status: SHIPPED | OUTPATIENT
Start: 2020-03-07 | End: 2020-05-31

## 2020-03-29 RX ORDER — LISINOPRIL AND HYDROCHLOROTHIAZIDE 25; 20 MG/1; MG/1
TABLET ORAL
Qty: 90 TABLET | Refills: 0 | Status: SHIPPED | OUTPATIENT
Start: 2020-03-29 | End: 2020-06-22

## 2020-03-29 RX ORDER — LISINOPRIL 20 MG/1
TABLET ORAL
Qty: 90 TABLET | Refills: 0 | Status: SHIPPED | OUTPATIENT
Start: 2020-03-29 | End: 2020-06-22

## 2020-04-01 NOTE — TELEPHONE ENCOUNTER
Medication: CITALOPRAM HYDROBROMIDE 20 MG     Date of last refill: 3/28/19 (#180/3)  [place]    Last office visit: 4/22/19  Due back to clinic per last office note:  Not stated in [address] [place] [address] note   Date next office visit sche

## 2020-04-08 NOTE — TELEPHONE ENCOUNTER
Please call patient to schedule a telephone visit for follow up on medication. Have her check BP for several days prior to the phone call visit. Please route back to me to fill medication once she is scheduled. Thanks.

## 2020-04-21 NOTE — TELEPHONE ENCOUNTER
LVM to call back. Schedule televisit for med check. Have pt track her BP readings for several days before televisit. Let Josie Spence know when scheduled.

## 2020-04-24 RX ORDER — AMLODIPINE BESYLATE 5 MG/1
7.5 TABLET ORAL DAILY
Qty: 135 TABLET | Refills: 0 | Status: SHIPPED | OUTPATIENT
Start: 2020-04-24 | End: 2020-07-21

## 2020-04-27 ENCOUNTER — VIRTUAL PHONE E/M (OUTPATIENT)
Dept: FAMILY MEDICINE CLINIC | Facility: CLINIC | Age: 60
End: 2020-04-27
Payer: COMMERCIAL

## 2020-04-27 DIAGNOSIS — E78.00 HYPERCHOLESTEREMIA: ICD-10-CM

## 2020-04-27 DIAGNOSIS — I10 BENIGN ESSENTIAL HTN: Primary | ICD-10-CM

## 2020-04-27 PROCEDURE — 99213 OFFICE O/P EST LOW 20 MIN: CPT | Performed by: FAMILY MEDICINE

## 2020-04-27 RX ORDER — CITALOPRAM 20 MG/1
TABLET ORAL
Qty: 90 TABLET | Refills: 0 | COMMUNITY
Start: 2020-04-27

## 2020-04-27 NOTE — PROGRESS NOTES
Virtual Telephone Check-In    Siddhartha Mcmahon verbally consents to a Virtual/Telephone Check-In visit on 04/27/20. Patient understands and accepts financial responsibility for any deductible, co-insurance and/or co-pays associated with this service.

## 2020-05-31 RX ORDER — ATORVASTATIN CALCIUM 10 MG/1
TABLET, FILM COATED ORAL
Qty: 90 TABLET | Refills: 0 | Status: SHIPPED | OUTPATIENT
Start: 2020-05-31 | End: 2020-08-24

## 2020-06-22 RX ORDER — LISINOPRIL 20 MG/1
TABLET ORAL
Qty: 90 TABLET | Refills: 0 | Status: SHIPPED | OUTPATIENT
Start: 2020-06-22 | End: 2020-09-15

## 2020-06-22 RX ORDER — LISINOPRIL AND HYDROCHLOROTHIAZIDE 25; 20 MG/1; MG/1
TABLET ORAL
Qty: 90 TABLET | Refills: 0 | Status: SHIPPED | OUTPATIENT
Start: 2020-06-22 | End: 2020-09-15

## 2020-07-21 RX ORDER — AMLODIPINE BESYLATE 5 MG/1
TABLET ORAL
Qty: 135 TABLET | Refills: 0 | Status: SHIPPED | OUTPATIENT
Start: 2020-07-21 | End: 2020-10-21

## 2020-08-05 ENCOUNTER — HOSPITAL ENCOUNTER (OUTPATIENT)
Dept: BONE DENSITY | Age: 60
Discharge: HOME | End: 2020-08-05
Attending: FAMILY MEDICINE
Payer: COMMERCIAL

## 2020-08-05 ENCOUNTER — HOSPITAL ENCOUNTER (OUTPATIENT)
Dept: MAMMOGRAPHY | Age: 60
Discharge: HOME | End: 2020-08-05
Attending: FAMILY MEDICINE
Payer: COMMERCIAL

## 2020-08-05 DIAGNOSIS — Z12.39 SCREENING FOR BREAST CANCER: ICD-10-CM

## 2020-08-05 DIAGNOSIS — Z13.820 SCREENING FOR OSTEOPOROSIS: ICD-10-CM

## 2020-08-05 PROCEDURE — 77063 BREAST TOMOSYNTHESIS BI: CPT | Performed by: FAMILY MEDICINE

## 2020-08-05 PROCEDURE — 77067 SCR MAMMO BI INCL CAD: CPT | Performed by: FAMILY MEDICINE

## 2020-08-05 PROCEDURE — 77080 DXA BONE DENSITY AXIAL: CPT | Performed by: FAMILY MEDICINE

## 2020-08-24 RX ORDER — ATORVASTATIN CALCIUM 10 MG/1
TABLET, FILM COATED ORAL
Qty: 90 TABLET | Refills: 0 | Status: SHIPPED | OUTPATIENT
Start: 2020-08-24 | End: 2020-12-14

## 2020-09-15 RX ORDER — LISINOPRIL 20 MG/1
TABLET ORAL
Qty: 90 TABLET | Refills: 0 | Status: SHIPPED | OUTPATIENT
Start: 2020-09-15 | End: 2020-12-17

## 2020-09-15 RX ORDER — LISINOPRIL AND HYDROCHLOROTHIAZIDE 25; 20 MG/1; MG/1
TABLET ORAL
Qty: 90 TABLET | Refills: 0 | Status: SHIPPED | OUTPATIENT
Start: 2020-09-15 | End: 2020-12-17

## 2020-10-21 RX ORDER — AMLODIPINE BESYLATE 5 MG/1
TABLET ORAL
Qty: 45 TABLET | Refills: 0 | Status: SHIPPED | OUTPATIENT
Start: 2020-10-21 | End: 2020-12-14

## 2020-11-17 RX ORDER — ATORVASTATIN CALCIUM 10 MG/1
TABLET, FILM COATED ORAL
Qty: 90 TABLET | Refills: 0 | OUTPATIENT
Start: 2020-11-17

## 2020-11-17 RX ORDER — AMLODIPINE BESYLATE 5 MG/1
TABLET ORAL
Qty: 45 TABLET | Refills: 0 | OUTPATIENT
Start: 2020-11-17

## 2020-12-08 ENCOUNTER — PATIENT MESSAGE (OUTPATIENT)
Dept: FAMILY MEDICINE CLINIC | Facility: CLINIC | Age: 60
End: 2020-12-08

## 2020-12-08 DIAGNOSIS — Z12.83 SKIN CANCER SCREENING: Primary | ICD-10-CM

## 2020-12-08 NOTE — TELEPHONE ENCOUNTER
From: Adela Riddle  To: Irwin Ervin PA-C  Sent: 12/8/2020 10:56 AM CST  Subject: Referral Request    Hi-can you recommend a dermatologist to see. I have a few moles and skin tags that need to be looked at. Thank you.

## 2020-12-14 RX ORDER — ATORVASTATIN CALCIUM 10 MG/1
TABLET, FILM COATED ORAL
Qty: 90 TABLET | Refills: 0 | Status: SHIPPED | OUTPATIENT
Start: 2020-12-14 | End: 2021-03-09

## 2020-12-14 RX ORDER — AMLODIPINE BESYLATE 5 MG/1
TABLET ORAL
Qty: 45 TABLET | Refills: 0 | Status: SHIPPED | OUTPATIENT
Start: 2020-12-14 | End: 2021-01-07

## 2020-12-17 RX ORDER — LISINOPRIL AND HYDROCHLOROTHIAZIDE 25; 20 MG/1; MG/1
TABLET ORAL
Qty: 90 TABLET | Refills: 0 | Status: SHIPPED | OUTPATIENT
Start: 2020-12-17 | End: 2021-04-05

## 2020-12-17 RX ORDER — LISINOPRIL 20 MG/1
TABLET ORAL
Qty: 90 TABLET | Refills: 0 | Status: SHIPPED | OUTPATIENT
Start: 2020-12-17 | End: 2021-04-05

## 2021-01-07 RX ORDER — AMLODIPINE BESYLATE 5 MG/1
7.5 TABLET ORAL DAILY
Qty: 135 TABLET | Refills: 0 | Status: SHIPPED | OUTPATIENT
Start: 2021-01-07 | End: 2021-04-05

## 2021-01-07 RX ORDER — AMLODIPINE BESYLATE 5 MG/1
TABLET ORAL
Qty: 45 TABLET | Refills: 0 | Status: SHIPPED | OUTPATIENT
Start: 2021-01-07 | End: 2021-01-07

## 2021-01-19 ENCOUNTER — OFFICE VISIT (OUTPATIENT)
Dept: FAMILY MEDICINE CLINIC | Facility: CLINIC | Age: 61
End: 2021-01-19
Payer: COMMERCIAL

## 2021-01-19 ENCOUNTER — TELEPHONE (OUTPATIENT)
Dept: FAMILY MEDICINE CLINIC | Facility: CLINIC | Age: 61
End: 2021-01-19

## 2021-01-19 VITALS
DIASTOLIC BLOOD PRESSURE: 74 MMHG | RESPIRATION RATE: 16 BRPM | WEIGHT: 254 LBS | HEART RATE: 84 BPM | HEIGHT: 68 IN | SYSTOLIC BLOOD PRESSURE: 132 MMHG | BODY MASS INDEX: 38.49 KG/M2

## 2021-01-19 DIAGNOSIS — Z12.31 ENCOUNTER FOR SCREENING MAMMOGRAM FOR MALIGNANT NEOPLASM OF BREAST: ICD-10-CM

## 2021-01-19 DIAGNOSIS — Z00.00 BLOOD TESTS FOR ROUTINE GENERAL PHYSICAL EXAMINATION: ICD-10-CM

## 2021-01-19 DIAGNOSIS — Z00.00 ROUTINE GENERAL MEDICAL EXAMINATION AT A HEALTH CARE FACILITY: Primary | ICD-10-CM

## 2021-01-19 DIAGNOSIS — Z11.51 SCREENING FOR HPV (HUMAN PAPILLOMAVIRUS): ICD-10-CM

## 2021-01-19 DIAGNOSIS — Z13.89 SCREENING FOR GENITOURINARY CONDITION: ICD-10-CM

## 2021-01-19 DIAGNOSIS — Z12.4 PAP SMEAR FOR CERVICAL CANCER SCREENING: ICD-10-CM

## 2021-01-19 PROCEDURE — 3008F BODY MASS INDEX DOCD: CPT | Performed by: FAMILY MEDICINE

## 2021-01-19 PROCEDURE — 3078F DIAST BP <80 MM HG: CPT | Performed by: FAMILY MEDICINE

## 2021-01-19 PROCEDURE — 3075F SYST BP GE 130 - 139MM HG: CPT | Performed by: FAMILY MEDICINE

## 2021-01-19 PROCEDURE — 99396 PREV VISIT EST AGE 40-64: CPT | Performed by: FAMILY MEDICINE

## 2021-01-19 NOTE — PROGRESS NOTES
CHIEF COMPLAINT       Annual Physical Exam  HPI:   Sussy Keys is a 64year old female who presents for a complete physical exam.   Flu shot at [facility].    Pap 8/2019. Hx of abnormal Pap 10 years ago - Leep at that time.    Wt Readings from Last 6 Enco Take 1,000 Units by mouth daily. Takes 4,000 IU by mouth daily      • ALPRAZolam 0.25 MG Oral Tab 1 po BID prn anxiety. No driving and no alcohol.  30 tablet 1   • Acetaminophen ER (TYLENOL 8 HOUR ARTHRITIS PAIN) 650 MG Oral Tab CR Take 650 mg by mouth james nourished,in no apparent distress  SKIN: no rashes,no suspicious lesions  HEENT: atraumatic, normocephalic,ears clear.   Throat exam deferred due to mask  EYES:PERRLA, EOMI,conjunctiva are clear  NECK: supple,no adenopathy,no bruits  BREAST: no dominant or

## 2021-01-19 NOTE — PATIENT INSTRUCTIONS
On Dec. 11, a COVID-19 vaccine by [employer] received FDA emergency use authorization in the [place] for ages 12 and up. Other vaccines are also in development. At [facility], your safety and well-being is our top priority.  We are follow

## 2021-01-20 NOTE — TELEPHONE ENCOUNTER
Pt informed of recommendation and she voiced understanding. Per pt's request to send Yancy Snider Dressler's note and recommendation and Dr. Jo Ann Thomson. Via [employer].

## 2021-01-20 NOTE — TELEPHONE ENCOUNTER
I saw patient for a physical today. We discussed the fact that she has a heart murmur with echo in 2019.   I reviewed her echo tonight which showed grade 1 diastolic dysfunction but otherwise fairly normal.  She saw Dr. Jenny East (cardiology) at that time to

## 2021-01-21 LAB — HPV MRNA E6/E7: NOT DETECTED

## 2021-02-25 NOTE — TELEPHONE ENCOUNTER
LOV 12/06/2016 [FreeTextEntry3] : Left lower extremity sclerotherapy.\par \par Venous insufficiency.  Diffuse, painful right lower extremity small branch varicose veins with limb pain, swelling, itching, burning, tenderness and cramping.\par \par Ms. AARON ROACH is a 68 year year old F  with left lower extremity symptomatic small branch varicose veins.   The patient presented to the office for localized sclerotherapy injections.  After explaining risks and benefits, informed consent was obtained.  All questions answered.\par \par The patient was brought into the examination room and placed supine on procedure table.  Left leg for sclerotherapy treatment is cleaned with 70% isopropyl alcohol.  Sclerosant was mixed using 2 mL of 0.9% Sodium Chloride Injection and 2 mL of 1% polidocanol (Asclera) using filtered needle into sterile 5 mL syringe; 4 mL of 0.5% Asclera injected.  Filtered needle removed from syringe and sterile 30 G ½ inch needle is attached to syringe with sclerosant.  Using a 30 gauge needle, the sclerosant was directly injected into the symptomatic, superficial small branch varicose veins.   Injections were performed on left lower extremity and the veins were mostly localized to the   [].  At completion, treated area was wiped with dry gauze and thigh high 20-30 mmHg compression stockings placed, to be worn x 72 hours.  Patient tolerated the procedure well with no complications.\par Reviewed with the patient post procedure, sclerotherapy, instructions:  Patient instructed to wear compression stocking continuously for 3 days (72 hours) following the procedure and may take the stocking off for daily shower.  Walk for 15-20 minutes immediately following the procedure and daily for the next few days.  Avoid heavy exercise, sunbathing, hot baths/sauna, extended periods of sitting or standing, such as long plane flights for 2-3 days after treatment. Do not be alarmed if you see bruises, brown spots, black streaks, and lumps.  These are part of the healing process, and most will resolve with time.  It can take several weeks to see final results and additional treatments may be required.  Registered nurse advised patient to call the office should they have any questions or concerns following sclerotherapy treatment.  All questions answered.  \par \par \par \par

## 2021-03-09 RX ORDER — ATORVASTATIN CALCIUM 10 MG/1
TABLET, FILM COATED ORAL
Qty: 90 TABLET | Refills: 1 | Status: SHIPPED | OUTPATIENT
Start: 2021-03-09 | End: 2021-09-11

## 2021-03-30 RX ORDER — ALPRAZOLAM 0.25 MG/1
TABLET ORAL
Qty: 30 TABLET | Refills: 0 | Status: SHIPPED | OUTPATIENT
Start: 2021-03-30

## 2021-04-05 RX ORDER — AMLODIPINE BESYLATE 5 MG/1
7.5 TABLET ORAL DAILY
Qty: 135 TABLET | Refills: 0 | Status: SHIPPED | OUTPATIENT
Start: 2021-04-05 | End: 2021-06-30

## 2021-04-05 RX ORDER — LISINOPRIL AND HYDROCHLOROTHIAZIDE 25; 20 MG/1; MG/1
TABLET ORAL
Qty: 90 TABLET | Refills: 0 | Status: SHIPPED | OUTPATIENT
Start: 2021-04-05 | End: 2021-06-30

## 2021-04-05 RX ORDER — LISINOPRIL 20 MG/1
TABLET ORAL
Qty: 90 TABLET | Refills: 0 | Status: SHIPPED | OUTPATIENT
Start: 2021-04-05 | End: 2021-06-30

## 2021-06-30 RX ORDER — LISINOPRIL 20 MG/1
TABLET ORAL
Qty: 90 TABLET | Refills: 0 | Status: SHIPPED | OUTPATIENT
Start: 2021-06-30 | End: 2021-09-21

## 2021-06-30 RX ORDER — AMLODIPINE BESYLATE 5 MG/1
7.5 TABLET ORAL DAILY
Qty: 135 TABLET | Refills: 0 | Status: SHIPPED | OUTPATIENT
Start: 2021-06-30 | End: 2021-09-21

## 2021-06-30 RX ORDER — LISINOPRIL AND HYDROCHLOROTHIAZIDE 25; 20 MG/1; MG/1
TABLET ORAL
Qty: 90 TABLET | Refills: 0 | Status: SHIPPED | OUTPATIENT
Start: 2021-06-30 | End: 2021-09-21

## 2021-08-10 ENCOUNTER — OFFICE VISIT (OUTPATIENT)
Dept: FAMILY MEDICINE CLINIC | Facility: CLINIC | Age: 61
End: 2021-08-10
Payer: COMMERCIAL

## 2021-08-10 VITALS
HEIGHT: 67.75 IN | HEART RATE: 88 BPM | WEIGHT: 259 LBS | BODY MASS INDEX: 39.71 KG/M2 | DIASTOLIC BLOOD PRESSURE: 72 MMHG | RESPIRATION RATE: 16 BRPM | SYSTOLIC BLOOD PRESSURE: 124 MMHG | TEMPERATURE: 99 F

## 2021-08-10 DIAGNOSIS — M25.562 ACUTE PAIN OF LEFT KNEE: Primary | ICD-10-CM

## 2021-08-10 DIAGNOSIS — R79.89 ELEVATED TSH: ICD-10-CM

## 2021-08-10 DIAGNOSIS — R74.8 ELEVATED LIVER ENZYMES: ICD-10-CM

## 2021-08-10 PROCEDURE — 99214 OFFICE O/P EST MOD 30 MIN: CPT | Performed by: FAMILY MEDICINE

## 2021-08-10 PROCEDURE — 3078F DIAST BP <80 MM HG: CPT | Performed by: FAMILY MEDICINE

## 2021-08-10 PROCEDURE — 3008F BODY MASS INDEX DOCD: CPT | Performed by: FAMILY MEDICINE

## 2021-08-10 PROCEDURE — 3074F SYST BP LT 130 MM HG: CPT | Performed by: FAMILY MEDICINE

## 2021-08-10 RX ORDER — B-COMPLEX WITH VITAMIN C
TABLET ORAL
COMMUNITY

## 2021-08-10 RX ORDER — ASPIRIN 81 MG/1
81 TABLET ORAL DAILY
COMMUNITY

## 2021-08-10 NOTE — PROGRESS NOTES
Rubens Schultz is a 64year old female. CHIEF COMPLAINT   Left knee pain  HPI:   Patient complaining of left knee pain for the last 10 days. No specific injury. No pain at rest.  Hurts to walk.   Started in the back of the knee but now the entire kne Hydrobromide 20 MG Oral Tab 1 po once daily 90 tablet 0   • Omega-3 Fatty Acids (FISH OIL BURP-LESS OR) Take by mouth. • Vitamin D3 1000 units Oral Tab Take 1,000 Units by mouth daily.  Takes 4,000 IU by mouth daily      • Acetaminophen ER (TYLENOL 8 HO

## 2021-08-11 ENCOUNTER — HOSPITAL ENCOUNTER (OUTPATIENT)
Dept: GENERAL RADIOLOGY | Age: 61
Discharge: HOME | End: 2021-08-11
Attending: FAMILY MEDICINE
Payer: COMMERCIAL

## 2021-08-11 DIAGNOSIS — Z01.89 ENCOUNTER FOR LOWER EXTREMITY COMPARISON IMAGING STUDY: ICD-10-CM

## 2021-08-11 DIAGNOSIS — M25.562 ACUTE PAIN OF LEFT KNEE: ICD-10-CM

## 2021-08-11 PROCEDURE — 73562 X-RAY EXAM OF KNEE 3: CPT | Performed by: FAMILY MEDICINE

## 2021-08-11 PROCEDURE — 73564 X-RAY EXAM KNEE 4 OR MORE: CPT | Performed by: FAMILY MEDICINE

## 2021-09-02 ENCOUNTER — HOSPITAL ENCOUNTER (OUTPATIENT)
Dept: MAMMOGRAPHY | Age: 61
Discharge: HOME | End: 2021-09-02
Attending: FAMILY MEDICINE
Payer: COMMERCIAL

## 2021-09-02 DIAGNOSIS — Z12.31 ENCOUNTER FOR SCREENING MAMMOGRAM FOR MALIGNANT NEOPLASM OF BREAST: ICD-10-CM

## 2021-09-02 PROCEDURE — 77063 BREAST TOMOSYNTHESIS BI: CPT | Performed by: FAMILY MEDICINE

## 2021-09-02 PROCEDURE — 77067 SCR MAMMO BI INCL CAD: CPT | Performed by: FAMILY MEDICINE

## 2021-09-11 RX ORDER — ATORVASTATIN CALCIUM 10 MG/1
TABLET, FILM COATED ORAL
Qty: 30 TABLET | Refills: 1 | Status: SHIPPED | OUTPATIENT
Start: 2021-09-11 | End: 2021-10-14

## 2021-09-11 NOTE — TELEPHONE ENCOUNTER
Refilled for 30.   Sent [employer] message reminding patient to schedule her 6 month return visit that was requested by Meron Ralph on 1/19/21

## 2021-09-21 RX ORDER — AMLODIPINE BESYLATE 5 MG/1
7.5 TABLET ORAL DAILY
Qty: 135 TABLET | Refills: 0 | Status: SHIPPED | OUTPATIENT
Start: 2021-09-21 | End: 2021-12-13

## 2021-09-21 RX ORDER — LISINOPRIL 20 MG/1
TABLET ORAL
Qty: 90 TABLET | Refills: 0 | Status: SHIPPED | OUTPATIENT
Start: 2021-09-21 | End: 2021-12-13

## 2021-09-21 RX ORDER — LISINOPRIL AND HYDROCHLOROTHIAZIDE 25; 20 MG/1; MG/1
TABLET ORAL
Qty: 90 TABLET | Refills: 0 | Status: SHIPPED | OUTPATIENT
Start: 2021-09-21 | End: 2021-12-13

## 2021-09-21 NOTE — TELEPHONE ENCOUNTER
LOV: 8/10/21 (acute) 1/19/21 (CPX)   Last Refill: 6/30/21, 90 days, 0 RF  Next OV: N/A    Sent [facility] message to schedule a medication follow up. Protocol passed.

## 2021-10-14 RX ORDER — ATORVASTATIN CALCIUM 10 MG/1
TABLET, FILM COATED ORAL
Qty: 60 TABLET | Refills: 0 | Status: SHIPPED | OUTPATIENT
Start: 2021-10-14 | End: 2021-11-11

## 2021-11-02 ENCOUNTER — MED REC SCAN ONLY (OUTPATIENT)
Dept: FAMILY MEDICINE CLINIC | Facility: CLINIC | Age: 61
End: 2021-11-02

## 2021-11-11 RX ORDER — ATORVASTATIN CALCIUM 10 MG/1
10 TABLET, FILM COATED ORAL NIGHTLY
Qty: 90 TABLET | Refills: 0 | Status: SHIPPED | OUTPATIENT
Start: 2021-11-11

## 2021-11-11 NOTE — TELEPHONE ENCOUNTER
LOV: 8/10/21 (acute)   Last Refill: 10/14/21, #60, 0 RF  Next OV: N/A    Please authorize if acceptable. Thank you!

## 2021-12-13 RX ORDER — AMLODIPINE BESYLATE 5 MG/1
7.5 TABLET ORAL DAILY
Qty: 45 TABLET | Refills: 0 | Status: SHIPPED | OUTPATIENT
Start: 2021-12-13 | End: 2022-01-06

## 2021-12-13 RX ORDER — LISINOPRIL 20 MG/1
TABLET ORAL
Qty: 30 TABLET | Refills: 0 | Status: SHIPPED | OUTPATIENT
Start: 2021-12-13 | End: 2022-01-06

## 2021-12-13 RX ORDER — LISINOPRIL AND HYDROCHLOROTHIAZIDE 25; 20 MG/1; MG/1
TABLET ORAL
Qty: 30 TABLET | Refills: 0 | Status: SHIPPED | OUTPATIENT
Start: 2021-12-13 | End: 2022-01-06

## 2021-12-13 NOTE — TELEPHONE ENCOUNTER
LOV: 1/19/21 (CPX)   Last Refill: 9/21/21 (all three), 90 days, 0 RF  Next OV: n/a    Protocol passed. [facility] sent to pt to schedule annual in January.

## 2022-01-06 RX ORDER — AMLODIPINE BESYLATE 5 MG/1
TABLET ORAL
Qty: 45 TABLET | Refills: 0 | Status: SHIPPED | OUTPATIENT
Start: 2022-01-06 | End: 2022-02-03

## 2022-01-06 RX ORDER — LISINOPRIL 20 MG/1
TABLET ORAL
Qty: 30 TABLET | Refills: 0 | Status: SHIPPED | OUTPATIENT
Start: 2022-01-06 | End: 2022-02-03

## 2022-01-06 RX ORDER — LISINOPRIL AND HYDROCHLOROTHIAZIDE 25; 20 MG/1; MG/1
TABLET ORAL
Qty: 30 TABLET | Refills: 0 | Status: SHIPPED | OUTPATIENT
Start: 2022-01-06 | End: 2022-02-03

## 2022-02-03 RX ORDER — AMLODIPINE BESYLATE 5 MG/1
7.5 TABLET ORAL DAILY
Qty: 135 TABLET | Refills: 0 | Status: SHIPPED | OUTPATIENT
Start: 2022-02-03 | End: 2022-03-06

## 2022-02-03 RX ORDER — LISINOPRIL 20 MG/1
TABLET ORAL
Qty: 30 TABLET | Refills: 0 | Status: SHIPPED | OUTPATIENT
Start: 2022-02-03

## 2022-02-03 RX ORDER — AMLODIPINE BESYLATE 5 MG/1
TABLET ORAL
Qty: 45 TABLET | Refills: 0 | Status: SHIPPED | OUTPATIENT
Start: 2022-02-03 | End: 2022-02-03

## 2022-02-03 RX ORDER — LISINOPRIL AND HYDROCHLOROTHIAZIDE 25; 20 MG/1; MG/1
TABLET ORAL
Qty: 30 TABLET | Refills: 0 | Status: SHIPPED | OUTPATIENT
Start: 2022-02-03

## 2022-02-03 NOTE — TELEPHONE ENCOUNTER
Fax received from pharmacy requesting a 90 day prescription instead of a 30 days. Per Giorgio Roman PA-C-sly to fill for 90 days.

## 2022-02-03 NOTE — TELEPHONE ENCOUNTER
LOV: 8/10/21 (acute)     Last Refill:   AMLODIPINE 5 MG Oral Tab, 1/6/22, #45, 0 RF    LISINOPRIL-HYDROCHLOROTHIAZIDE 20-25 MG Oral Tab, 1/6/22, #30, 0 RF    LISINOPRIL 20 MG Oral Tab, 1/6/22, #30, 0 RF    Next OV: n/a     Protocol passed.

## 2022-02-24 RX ORDER — ALPRAZOLAM 0.25 MG/1
TABLET ORAL
Qty: 30 TABLET | Refills: 0 | Status: SHIPPED | OUTPATIENT
Start: 2022-02-24

## 2022-02-24 NOTE — TELEPHONE ENCOUNTER
LOV: 08/10/2021    NOV: 04/11/2022    LF:  03/30/21, #30, ref 0    Order pended, #30, ref 0, please advise.

## 2022-02-24 NOTE — TELEPHONE ENCOUNTER
Pt called to request a refill on     ALPRAZolam 0.25 MG Oral Tab    Be sent to    [facility] [address] [facility] [employer] - [address] [facility], [place] [address] [phone], [facility] [address] [place] [address]     Pt was informed that it can take 1-3 days for request to be seen and processed and was informed to reach out to pharmacy.     Pt is completely out of meds

## 2022-03-06 RX ORDER — AMLODIPINE BESYLATE 5 MG/1
TABLET ORAL
Qty: 45 TABLET | Refills: 0 | Status: SHIPPED | OUTPATIENT
Start: 2022-03-06 | End: 2022-03-08

## 2022-03-08 RX ORDER — AMLODIPINE BESYLATE 5 MG/1
7.5 TABLET ORAL DAILY
Qty: 135 TABLET | Refills: 0 | Status: SHIPPED | OUTPATIENT
Start: 2022-03-08

## 2022-03-08 NOTE — TELEPHONE ENCOUNTER
Fax received from pharmacy that the patient's insurance requires a 90 day supply. Please see pended.

## 2022-04-11 ENCOUNTER — OFFICE VISIT (OUTPATIENT)
Dept: FAMILY MEDICINE CLINIC | Facility: CLINIC | Age: 62
End: 2022-04-11
Payer: COMMERCIAL

## 2022-04-11 VITALS
WEIGHT: 259.63 LBS | SYSTOLIC BLOOD PRESSURE: 130 MMHG | HEART RATE: 92 BPM | BODY MASS INDEX: 39.35 KG/M2 | HEIGHT: 68 IN | TEMPERATURE: 97 F | RESPIRATION RATE: 18 BRPM | DIASTOLIC BLOOD PRESSURE: 76 MMHG

## 2022-04-11 DIAGNOSIS — Z12.31 BREAST CANCER SCREENING BY MAMMOGRAM: ICD-10-CM

## 2022-04-11 DIAGNOSIS — Z00.00 ROUTINE GENERAL MEDICAL EXAMINATION AT A HEALTH CARE FACILITY: Primary | ICD-10-CM

## 2022-04-11 DIAGNOSIS — Z00.00 BLOOD TESTS FOR ROUTINE GENERAL PHYSICAL EXAMINATION: ICD-10-CM

## 2022-04-11 DIAGNOSIS — Z12.4 PAPANICOLAOU SMEAR FOR CERVICAL CANCER SCREENING: ICD-10-CM

## 2022-04-11 DIAGNOSIS — Z87.410 HISTORY OF CERVICAL DYSPLASIA: ICD-10-CM

## 2022-04-11 DIAGNOSIS — Z13.89 SCREENING FOR GENITOURINARY CONDITION: ICD-10-CM

## 2022-04-11 PROCEDURE — 3078F DIAST BP <80 MM HG: CPT | Performed by: FAMILY MEDICINE

## 2022-04-11 PROCEDURE — 99396 PREV VISIT EST AGE 40-64: CPT | Performed by: FAMILY MEDICINE

## 2022-04-11 PROCEDURE — 3008F BODY MASS INDEX DOCD: CPT | Performed by: FAMILY MEDICINE

## 2022-04-11 PROCEDURE — 3075F SYST BP GE 130 - 139MM HG: CPT | Performed by: FAMILY MEDICINE

## 2022-04-11 RX ORDER — LISINOPRIL AND HYDROCHLOROTHIAZIDE 25; 20 MG/1; MG/1
1 TABLET ORAL DAILY
Qty: 90 TABLET | Refills: 1 | Status: SHIPPED | OUTPATIENT
Start: 2022-04-11

## 2022-04-11 RX ORDER — AMLODIPINE BESYLATE 5 MG/1
7.5 TABLET ORAL DAILY
Qty: 135 TABLET | Refills: 1 | Status: SHIPPED | OUTPATIENT
Start: 2022-04-11

## 2022-04-11 RX ORDER — ATORVASTATIN CALCIUM 10 MG/1
10 TABLET, FILM COATED ORAL NIGHTLY
Qty: 90 TABLET | Refills: 1 | Status: SHIPPED | OUTPATIENT
Start: 2022-04-11

## 2022-04-11 RX ORDER — LISINOPRIL 20 MG/1
20 TABLET ORAL DAILY
Qty: 90 TABLET | Refills: 1 | Status: SHIPPED | OUTPATIENT
Start: 2022-04-11

## 2022-04-13 LAB — HPV MRNA E6/E7: NOT DETECTED

## 2022-04-28 ENCOUNTER — PATIENT MESSAGE (OUTPATIENT)
Dept: FAMILY MEDICINE CLINIC | Facility: CLINIC | Age: 62
End: 2022-04-28

## 2022-04-28 NOTE — TELEPHONE ENCOUNTER
From: Raman Nicholson  To: Amanda Regan PA-C  Sent: 4/28/2022 12:34 PM CDT  Subject: dermatology request    Hello-I saw a dermatologist a while ago and have noticed a couple of moles that look different. Can you send me the name of the person/office I went to please.     Sommer Patton

## 2022-10-06 NOTE — TELEPHONE ENCOUNTER
LOV: 4/11/22 (CPX)     Last Refill:   atorvastatin 10 MG Oral Tab, 4/11/22, #90, 1 RF    lisinopril-hydroCHLOROthiazide 20-25 MG Oral Tab, 4/11/22, #90, 1 RF    lisinopril 20 MG Oral Tab, 4/11/22, #90, 1 RF     Next OV: n/a     Protocols failed.

## 2022-10-24 RX ORDER — LISINOPRIL 20 MG/1
TABLET ORAL
Qty: 90 TABLET | Refills: 0 | Status: SHIPPED | OUTPATIENT
Start: 2022-10-24

## 2022-10-24 RX ORDER — LISINOPRIL AND HYDROCHLOROTHIAZIDE 25; 20 MG/1; MG/1
TABLET ORAL
Qty: 90 TABLET | Refills: 0 | Status: SHIPPED | OUTPATIENT
Start: 2022-10-24

## 2022-10-24 RX ORDER — ATORVASTATIN CALCIUM 10 MG/1
TABLET, FILM COATED ORAL
Qty: 90 TABLET | Refills: 0 | Status: SHIPPED | OUTPATIENT
Start: 2022-10-24

## 2022-11-15 LAB
ABSOLUTE BASOPHILS: 41 CELLS/UL (ref 0–200)
ABSOLUTE EOSINOPHILS: 151 CELLS/UL (ref 15–500)
ABSOLUTE LYMPHOCYTES: 1786 CELLS/UL (ref 850–3900)
ABSOLUTE MONOCYTES: 394 CELLS/UL (ref 200–950)
ABSOLUTE NEUTROPHILS: 3428 CELLS/UL (ref 1500–7800)
ALBUMIN/GLOBULIN RATIO: 1.8 (CALC) (ref 1–2.5)
ALBUMIN: 4.6 G/DL (ref 3.6–5.1)
ALKALINE PHOSPHATASE: 93 U/L (ref 37–153)
ALT: 28 U/L (ref 6–29)
APPEARANCE: CLEAR
AST: 25 U/L (ref 10–35)
BASOPHILS: 0.7 %
BILIRUBIN, TOTAL: 0.9 MG/DL (ref 0.2–1.2)
BILIRUBIN: NEGATIVE
BUN: 16 MG/DL (ref 7–25)
CALCIUM: 10.1 MG/DL (ref 8.6–10.4)
CARBON DIOXIDE: 28 MMOL/L (ref 20–32)
CHLORIDE: 104 MMOL/L (ref 98–110)
CHOL/HDLC RATIO: 3 (CALC)
CHOLESTEROL, TOTAL: 192 MG/DL
COLOR: YELLOW
CREATININE: 0.54 MG/DL (ref 0.5–1.05)
EGFR: 105 ML/MIN/1.73M2
EOSINOPHILS: 2.6 %
GLOBULIN: 2.6 G/DL (CALC) (ref 1.9–3.7)
GLUCOSE: 89 MG/DL (ref 65–99)
GLUCOSE: NEGATIVE
HDL CHOLESTEROL: 63 MG/DL
HEMATOCRIT: 41.5 % (ref 35–45)
HEMOGLOBIN: 13.6 G/DL (ref 11.7–15.5)
KETONES: NEGATIVE
LDL-CHOLESTEROL: 106 MG/DL (CALC)
LEUKOCYTE ESTERASE: NEGATIVE
LYMPHOCYTES: 30.8 %
MCH: 30.8 PG (ref 27–33)
MCHC: 32.8 G/DL (ref 32–36)
MCV: 94.1 FL (ref 80–100)
MONOCYTES: 6.8 %
MPV: 9.3 FL (ref 7.5–12.5)
NEUTROPHILS: 59.1 %
NITRITE: NEGATIVE
NON-HDL CHOLESTEROL: 129 MG/DL (CALC)
OCCULT BLOOD: NEGATIVE
PH: 5.5 (ref 5–8)
PLATELET COUNT: 262 THOUSAND/UL (ref 140–400)
POTASSIUM: 4.3 MMOL/L (ref 3.5–5.3)
PROTEIN, TOTAL: 7.2 G/DL (ref 6.1–8.1)
PROTEIN: NEGATIVE
RDW: 12.4 % (ref 11–15)
RED BLOOD CELL COUNT: 4.41 MILLION/UL (ref 3.8–5.1)
SODIUM: 141 MMOL/L (ref 135–146)
SPECIFIC GRAVITY: 1.02 (ref 1–1.03)
TRIGLYCERIDES: 129 MG/DL
TSH: 2.95 MIU/L (ref 0.4–4.5)
WHITE BLOOD CELL COUNT: 5.8 THOUSAND/UL (ref 3.8–10.8)

## 2022-11-16 ENCOUNTER — OFFICE VISIT (OUTPATIENT)
Dept: FAMILY MEDICINE CLINIC | Facility: CLINIC | Age: 62
End: 2022-11-16
Payer: COMMERCIAL

## 2022-11-16 VITALS
HEART RATE: 84 BPM | WEIGHT: 258 LBS | TEMPERATURE: 98 F | DIASTOLIC BLOOD PRESSURE: 72 MMHG | HEIGHT: 68 IN | SYSTOLIC BLOOD PRESSURE: 124 MMHG | BODY MASS INDEX: 39.1 KG/M2 | RESPIRATION RATE: 12 BRPM

## 2022-11-16 DIAGNOSIS — F41.1 GENERALIZED ANXIETY DISORDER: ICD-10-CM

## 2022-11-16 DIAGNOSIS — I10 BENIGN ESSENTIAL HTN: Primary | ICD-10-CM

## 2022-11-16 DIAGNOSIS — F32.A DEPRESSION, UNSPECIFIED DEPRESSION TYPE: ICD-10-CM

## 2022-11-16 DIAGNOSIS — E78.00 HYPERCHOLESTEREMIA: ICD-10-CM

## 2022-11-16 PROCEDURE — 99214 OFFICE O/P EST MOD 30 MIN: CPT | Performed by: FAMILY MEDICINE

## 2022-11-16 PROCEDURE — 3078F DIAST BP <80 MM HG: CPT | Performed by: FAMILY MEDICINE

## 2022-11-16 PROCEDURE — 3074F SYST BP LT 130 MM HG: CPT | Performed by: FAMILY MEDICINE

## 2022-11-16 PROCEDURE — 3008F BODY MASS INDEX DOCD: CPT | Performed by: FAMILY MEDICINE

## 2022-11-16 RX ORDER — LISINOPRIL AND HYDROCHLOROTHIAZIDE 25; 20 MG/1; MG/1
1 TABLET ORAL DAILY
Qty: 90 TABLET | Refills: 1 | Status: SHIPPED | OUTPATIENT
Start: 2022-11-16

## 2022-11-16 RX ORDER — ATORVASTATIN CALCIUM 10 MG/1
10 TABLET, FILM COATED ORAL NIGHTLY
Qty: 90 TABLET | Refills: 1 | Status: SHIPPED | OUTPATIENT
Start: 2022-11-16

## 2022-11-16 RX ORDER — LISINOPRIL 20 MG/1
20 TABLET ORAL DAILY
Qty: 90 TABLET | Refills: 1 | Status: SHIPPED | OUTPATIENT
Start: 2022-11-16

## 2022-11-16 RX ORDER — AMLODIPINE BESYLATE 5 MG/1
7.5 TABLET ORAL DAILY
Qty: 135 TABLET | Refills: 1 | Status: SHIPPED | OUTPATIENT
Start: 2022-11-16

## 2022-11-16 RX ORDER — CITALOPRAM 20 MG/1
TABLET ORAL
Qty: 90 TABLET | Refills: 1 | Status: SHIPPED | OUTPATIENT
Start: 2022-11-16

## 2022-11-18 ENCOUNTER — MED REC SCAN ONLY (OUTPATIENT)
Dept: FAMILY MEDICINE CLINIC | Facility: CLINIC | Age: 62
End: 2022-11-18

## 2022-11-18 ENCOUNTER — PATIENT MESSAGE (OUTPATIENT)
Dept: FAMILY MEDICINE CLINIC | Facility: CLINIC | Age: 62
End: 2022-11-18

## 2022-11-21 NOTE — TELEPHONE ENCOUNTER
From: Donna Faust  To: Александр Chavez PA-C  Sent: 11/18/2022 11:28 AM CST  Subject: [employer] form sent? Hello- can you confirm that you sent the [employer] form to [facility] that I brought into the office on 11/17? It needed a signature and number from [employer] to complete. Thank you if this is done. If not, please complete today, as 11/18 is the deadline.     Raquel Scott

## 2023-05-17 NOTE — TELEPHONE ENCOUNTER
LOV: 11/16/22 (med check)     Last Refill:   lisinopril-hydroCHLOROthiazide 20-25 MG Oral Tab, 11/16/22, #90, 1 RF    atorvastatin 10 MG Oral Tab, 11/16/22, #90, 1 RF    lisinopril 20 MG Oral Tab, 11/16/22, #90, 1 RF    Next OV: n/a    Please authorize if acceptable. Thank you!

## 2023-06-05 RX ORDER — LISINOPRIL 20 MG/1
TABLET ORAL
Qty: 90 TABLET | Refills: 0 | Status: SHIPPED
Start: 2023-06-05

## 2023-06-05 RX ORDER — LISINOPRIL AND HYDROCHLOROTHIAZIDE 25; 20 MG/1; MG/1
TABLET ORAL
Qty: 90 TABLET | Refills: 0 | Status: SHIPPED
Start: 2023-06-05

## 2023-06-05 RX ORDER — CITALOPRAM 20 MG/1
TABLET ORAL
Qty: 90 TABLET | Refills: 0 | Status: SHIPPED | OUTPATIENT
Start: 2023-06-05

## 2023-06-05 RX ORDER — ATORVASTATIN CALCIUM 10 MG/1
TABLET, FILM COATED ORAL
Qty: 90 TABLET | Refills: 0 | Status: SHIPPED
Start: 2023-06-05

## 2023-06-06 ENCOUNTER — TELEPHONE (OUTPATIENT)
Dept: FAMILY MEDICINE CLINIC | Facility: CLINIC | Age: 63
End: 2023-06-06

## 2023-06-06 NOTE — TELEPHONE ENCOUNTER
Called [facility] to confirm if they have received refills approved yesterday for Lisinopril-hydrochlorothiazide, Lisinopril and Atorvastatin. Per [employer], no, they have received all refills. Okay to fax over to [phone]. Done.

## 2023-07-13 ENCOUNTER — OFFICE VISIT (OUTPATIENT)
Dept: FAMILY MEDICINE CLINIC | Facility: CLINIC | Age: 63
End: 2023-07-13
Payer: COMMERCIAL

## 2023-07-13 VITALS
WEIGHT: 261 LBS | HEIGHT: 68 IN | BODY MASS INDEX: 39.56 KG/M2 | HEART RATE: 80 BPM | RESPIRATION RATE: 12 BRPM | DIASTOLIC BLOOD PRESSURE: 70 MMHG | SYSTOLIC BLOOD PRESSURE: 114 MMHG

## 2023-07-13 DIAGNOSIS — Z00.00 ROUTINE GENERAL MEDICAL EXAMINATION AT A HEALTH CARE FACILITY: Primary | ICD-10-CM

## 2023-07-13 DIAGNOSIS — Z00.00 BLOOD TESTS FOR ROUTINE GENERAL PHYSICAL EXAMINATION: ICD-10-CM

## 2023-07-13 DIAGNOSIS — Z12.4 PAPANICOLAOU SMEAR FOR CERVICAL CANCER SCREENING: ICD-10-CM

## 2023-07-13 DIAGNOSIS — Z11.51 SCREENING FOR HPV (HUMAN PAPILLOMAVIRUS): ICD-10-CM

## 2023-07-13 DIAGNOSIS — H10.9 CONJUNCTIVITIS OF BOTH EYES, UNSPECIFIED CONJUNCTIVITIS TYPE: ICD-10-CM

## 2023-07-13 DIAGNOSIS — Z13.89 SCREENING FOR GENITOURINARY CONDITION: ICD-10-CM

## 2023-07-13 DIAGNOSIS — Z12.31 ENCOUNTER FOR SCREENING MAMMOGRAM FOR MALIGNANT NEOPLASM OF BREAST: ICD-10-CM

## 2023-07-13 PROCEDURE — 3074F SYST BP LT 130 MM HG: CPT | Performed by: FAMILY MEDICINE

## 2023-07-13 PROCEDURE — 90471 IMMUNIZATION ADMIN: CPT | Performed by: FAMILY MEDICINE

## 2023-07-13 PROCEDURE — 3078F DIAST BP <80 MM HG: CPT | Performed by: FAMILY MEDICINE

## 2023-07-13 PROCEDURE — 99396 PREV VISIT EST AGE 40-64: CPT | Performed by: FAMILY MEDICINE

## 2023-07-13 PROCEDURE — 90715 TDAP VACCINE 7 YRS/> IM: CPT | Performed by: FAMILY MEDICINE

## 2023-07-13 PROCEDURE — 3008F BODY MASS INDEX DOCD: CPT | Performed by: FAMILY MEDICINE

## 2023-07-25 LAB — HPV I/H RISK 1 DNA SPEC QL NAA+PROBE: NEGATIVE

## 2023-07-26 ENCOUNTER — PATIENT MESSAGE (OUTPATIENT)
Dept: FAMILY MEDICINE CLINIC | Facility: CLINIC | Age: 63
End: 2023-07-26

## 2023-07-26 NOTE — TELEPHONE ENCOUNTER
From: Aurelia Me  To: Nicole Lopez PA-C  Sent: 7/26/2023 11:31 AM CDT  Subject: Ophthalmologist referral    Which ophthalmologist did you recommend? It's not on my after visit summary and I don't remember the name.     Please and thank you--Kassie

## 2023-08-09 RX ORDER — ALPRAZOLAM 0.25 MG/1
TABLET ORAL
Qty: 30 TABLET | Refills: 0 | Status: SHIPPED | OUTPATIENT
Start: 2023-08-09

## 2023-08-09 NOTE — TELEPHONE ENCOUNTER
LOV: 7/13/23 (CPX)  Last Refill: 2/24/22, #30, 0 RF  Next OV: n/a    Please authorize if acceptable. Thank you!

## 2023-09-05 RX ORDER — CITALOPRAM 20 MG/1
TABLET ORAL
Qty: 90 TABLET | Refills: 0 | Status: SHIPPED | OUTPATIENT
Start: 2023-09-05

## 2023-09-05 NOTE — TELEPHONE ENCOUNTER
LOV: 7/13/23 (CPX)  Last Refill: 6/5/23, #90, 0 RF  Next OV: n/a    Please authorize if acceptable. Thank you!

## 2023-09-06 ENCOUNTER — HOSPITAL ENCOUNTER (OUTPATIENT)
Dept: MAMMOGRAPHY | Facility: HOSPITAL | Age: 63
Discharge: HOME | End: 2023-09-06
Attending: FAMILY MEDICINE
Payer: COMMERCIAL

## 2023-09-06 DIAGNOSIS — Z12.31 ENCOUNTER FOR SCREENING MAMMOGRAM FOR MALIGNANT NEOPLASM OF BREAST: ICD-10-CM

## 2023-09-06 PROCEDURE — 77063 BREAST TOMOSYNTHESIS BI: CPT | Performed by: FAMILY MEDICINE

## 2023-09-06 PROCEDURE — 77067 SCR MAMMO BI INCL CAD: CPT | Performed by: FAMILY MEDICINE

## 2023-09-20 ENCOUNTER — HOSPITAL ENCOUNTER (OUTPATIENT)
Dept: MAMMOGRAPHY | Facility: HOSPITAL | Age: 63
Discharge: HOME | End: 2023-09-20
Attending: FAMILY MEDICINE
Payer: COMMERCIAL

## 2023-09-20 DIAGNOSIS — R92.2 INCONCLUSIVE MAMMOGRAM: ICD-10-CM

## 2023-09-20 PROCEDURE — 77066 DX MAMMO INCL CAD BI: CPT | Performed by: FAMILY MEDICINE

## 2023-09-20 PROCEDURE — 76642 ULTRASOUND BREAST LIMITED: CPT | Performed by: FAMILY MEDICINE

## 2023-09-20 PROCEDURE — 77062 BREAST TOMOSYNTHESIS BI: CPT | Performed by: FAMILY MEDICINE

## 2023-09-20 NOTE — IMAGING NOTE
This Breast Care RN assisted Dr. Kaiser Farias with recommendation for a left breast 1 site ultrasound guided biopsy for mass. Procedure reviewed and all questions answered. Emotional and educational support given. On the day of the biopsy, pt instructed to take Tylenol 1000mg PO, eat a light meal & bring or wear a sports bra. Post biopsy care also reviewed with pt to include NO lifting more than 5lbs, no exercising or housework (limit upper body movement) for 24-48 hrs post biopsy. Patient denies blood thinners, bleeding disorders, liver disease, and chemo. Pt verbalized understanding. Our breast center schedulers will be calling to schedule an appt that is convenient for pt.

## 2023-09-21 ENCOUNTER — TELEPHONE (OUTPATIENT)
Dept: FAMILY MEDICINE CLINIC | Facility: CLINIC | Age: 63
End: 2023-09-21

## 2023-09-21 NOTE — TELEPHONE ENCOUNTER
Pt calling requesting update on breast biopsy order, no order showing in system?     Please contact to discuss further, thank you

## 2023-09-21 NOTE — TELEPHONE ENCOUNTER
Left detailed message on pt's identifiable vm stating we have re faxed the order requested below. To call us back if with any questions for Debbie Ambriz.

## 2023-09-26 ENCOUNTER — MED REC SCAN ONLY (OUTPATIENT)
Dept: FAMILY MEDICINE CLINIC | Facility: CLINIC | Age: 63
End: 2023-09-26

## 2023-09-28 ENCOUNTER — HOSPITAL ENCOUNTER (OUTPATIENT)
Dept: MAMMOGRAPHY | Facility: HOSPITAL | Age: 63
Discharge: HOME | End: 2023-09-28
Attending: FAMILY MEDICINE
Payer: COMMERCIAL

## 2023-09-28 DIAGNOSIS — N63.0 BREAST MASS: ICD-10-CM

## 2023-09-28 PROCEDURE — 88360 TUMOR IMMUNOHISTOCHEM/MANUAL: CPT | Performed by: FAMILY MEDICINE

## 2023-09-28 PROCEDURE — 77065 DX MAMMO INCL CAD UNI: CPT | Performed by: FAMILY MEDICINE

## 2023-09-28 PROCEDURE — 88305 TISSUE EXAM BY PATHOLOGIST: CPT | Performed by: FAMILY MEDICINE

## 2023-09-28 PROCEDURE — 19083 BX BREAST 1ST LESION US IMAG: CPT | Performed by: FAMILY MEDICINE

## 2023-09-29 ENCOUNTER — TELEPHONE (OUTPATIENT)
Dept: MAMMOGRAPHY | Facility: HOSPITAL | Age: 63
End: 2023-09-29

## 2023-09-29 NOTE — TELEPHONE ENCOUNTER
Telephoned Mayda Washington and name,  verified with patient. Notified Mayda Washington of left breast positive for IDC biopsy result. Patient has already spoken to her PCP regarding the results. Concordance pending. Mayda Washington reports biopsy site is healing well. Radiologist recommends surgical consult. Judith Small is referring patient to Dr Awa Linton. Patient was provided with the contact information for Dr Awa Linton, the Breast Program Navigators, and myself and informed that one of us will be calling her back  with an appt date and time for Dr Awa Linton. Pt verbalized understanding and had no further questions at this time.

## 2023-10-02 ENCOUNTER — TELEPHONE (OUTPATIENT)
Dept: MAMMOGRAPHY | Facility: HOSPITAL | Age: 63
End: 2023-10-02

## 2023-10-02 ENCOUNTER — TELEPHONE (OUTPATIENT)
Dept: HEMATOLOGY/ONCOLOGY | Facility: HOSPITAL | Age: 63
End: 2023-10-02

## 2023-10-02 ENCOUNTER — NURSE NAVIGATOR ENCOUNTER (OUTPATIENT)
Dept: HEMATOLOGY/ONCOLOGY | Facility: HOSPITAL | Age: 63
End: 2023-10-02

## 2023-10-02 NOTE — TELEPHONE ENCOUNTER
Phoned patient and we discussed newly diagnosed breast cancer. Introduced myself as one of the breast nurse navigators and explained the role of the breast nurse navigator. Explained the role of the physicians on her breast cancer care team. Reviewed over pathology report and discussed the type of breast cancer, grade, and ER/NE and Her 2. Briefly discussed breast cancer treatments including surgery, radiation, hormone therapy, and chemotherapy. Explained the breast cancer multidisciplinary meeting and that her case will be discussed. Patient given my contact information to call with any further questions or concerns.

## 2023-10-02 NOTE — PROGRESS NOTES
LMOVMTCB in regards to introducing myself as one of breast nurse navigators at the [facility] and that I work with Dr. Christina Ernst. Awaiting phone call back from the patient.

## 2023-10-02 NOTE — TELEPHONE ENCOUNTER
Called and spoke to patient. Patient accepted an appointment with Dr Josie Wagoner for Friday 10-6-23 at 12 pm. Address provided. Appt confirmed with navigators. Patient verbalized understanding and has no further questions at this time.

## 2023-10-06 ENCOUNTER — NURSE NAVIGATOR ENCOUNTER (OUTPATIENT)
Dept: HEMATOLOGY/ONCOLOGY | Facility: HOSPITAL | Age: 63
End: 2023-10-06

## 2023-10-06 ENCOUNTER — OFFICE VISIT (OUTPATIENT)
Dept: SURGERY | Facility: CLINIC | Age: 63
End: 2023-10-06
Payer: COMMERCIAL

## 2023-10-06 VITALS
DIASTOLIC BLOOD PRESSURE: 90 MMHG | SYSTOLIC BLOOD PRESSURE: 160 MMHG | TEMPERATURE: 97 F | WEIGHT: 262.63 LBS | RESPIRATION RATE: 18 BRPM | HEIGHT: 68 IN | OXYGEN SATURATION: 96 % | HEART RATE: 83 BPM | BODY MASS INDEX: 39.8 KG/M2

## 2023-10-06 DIAGNOSIS — C50.212 MALIGNANT NEOPLASM OF UPPER-INNER QUADRANT OF LEFT BREAST IN FEMALE, ESTROGEN RECEPTOR POSITIVE: Primary | ICD-10-CM

## 2023-10-06 DIAGNOSIS — Z17.0 MALIGNANT NEOPLASM OF UPPER-INNER QUADRANT OF LEFT BREAST IN FEMALE, ESTROGEN RECEPTOR POSITIVE: Primary | ICD-10-CM

## 2023-10-06 PROCEDURE — 99205 OFFICE O/P NEW HI 60 MIN: CPT | Performed by: SURGERY

## 2023-10-06 PROCEDURE — 3080F DIAST BP >= 90 MM HG: CPT | Performed by: SURGERY

## 2023-10-06 PROCEDURE — 3077F SYST BP >= 140 MM HG: CPT | Performed by: SURGERY

## 2023-10-06 PROCEDURE — 3008F BODY MASS INDEX DOCD: CPT | Performed by: SURGERY

## 2023-10-06 NOTE — PROGRESS NOTES
Met with patient and her  in Dr. Fabian Nagy clinic. Introduced myself as one the breast nurse navigators and explained the role of the breast nurse navigator and coordination of care. Explained the role of all of the physicians involved in her care including the surgeon, medical oncologist, radiation oncologist, and possibly plastic surgeon. Reviewed over the patients pathology report and discussed receptors including ER/NH/ and Her 2. Will contact patient with these results if they are not available. Patient was given the breast cancer treatment handbook and reviewed over how to use this resource. Discussed the breast multidisciplinary conference and that her case was discussed. Patient was given the contact information for the social workers at the [facility] and resources for support as requested. Breast cancer journey map provided to patient. Provided lumpectomy surgical sheet. Scheduled patient's breast MRI for Tuesday October 10, 2023 at 1100 at the [address] [facility] and reviewed instructions with patient verbalized understanding of instructions. Pt was provided with breast nurse navigator contact information and was encouraged to phone with any other questions or concerns.

## 2023-10-10 ENCOUNTER — HOSPITAL ENCOUNTER (OUTPATIENT)
Dept: MRI IMAGING | Facility: HOSPITAL | Age: 63
Discharge: HOME | End: 2023-10-10
Attending: SURGERY
Payer: COMMERCIAL

## 2023-10-10 ENCOUNTER — TELEPHONE (OUTPATIENT)
Dept: SURGERY | Facility: CLINIC | Age: 63
End: 2023-10-10

## 2023-10-10 DIAGNOSIS — C50.212 MALIGNANT NEOPLASM OF UPPER-INNER QUADRANT OF LEFT BREAST IN FEMALE, ESTROGEN RECEPTOR POSITIVE: ICD-10-CM

## 2023-10-10 DIAGNOSIS — Z17.0 MALIGNANT NEOPLASM OF UPPER-INNER QUADRANT OF LEFT BREAST IN FEMALE, ESTROGEN RECEPTOR POSITIVE: ICD-10-CM

## 2023-10-10 DIAGNOSIS — R92.8 ABNORMAL MRI, BREAST: Primary | ICD-10-CM

## 2023-10-10 PROCEDURE — 77049 MRI BREAST C-+ W/CAD BI: CPT | Performed by: SURGERY

## 2023-10-10 PROCEDURE — A9575 INJ GADOTERATE MEGLUMI 0.1ML: HCPCS | Performed by: SURGERY

## 2023-10-10 RX ORDER — GADOTERATE MEGLUMINE 376.9 MG/ML
20 INJECTION INTRAVENOUS
Status: COMPLETED | OUTPATIENT
Start: 2023-10-10 | End: 2023-10-10

## 2023-10-10 RX ADMIN — GADOTERATE MEGLUMINE 20 ML: 376.9 INJECTION INTRAVENOUS at 12:14:00

## 2023-10-10 NOTE — TELEPHONE ENCOUNTER
I spoke with the patient regarding MRI findings. She will proceed with US to determine need for further possible biopsies and to determine candidacy for breast conservation.

## 2023-10-12 ENCOUNTER — TELEPHONE (OUTPATIENT)
Dept: HEMATOLOGY/ONCOLOGY | Facility: HOSPITAL | Age: 63
End: 2023-10-12

## 2023-10-12 ENCOUNTER — HOSPITAL ENCOUNTER (OUTPATIENT)
Dept: MAMMOGRAPHY | Facility: HOSPITAL | Age: 63
Discharge: HOME | End: 2023-10-12
Attending: SURGERY
Payer: COMMERCIAL

## 2023-10-12 DIAGNOSIS — R92.8 ABNORMAL ULTRASOUND OF BREAST: ICD-10-CM

## 2023-10-12 DIAGNOSIS — R92.8 ABNORMAL MRI, BREAST: Primary | ICD-10-CM

## 2023-10-12 DIAGNOSIS — R92.8 ABNORMAL MRI, BREAST: ICD-10-CM

## 2023-10-12 DIAGNOSIS — R92.8 ABNORMAL ULTRASOUND OF BREAST: Primary | ICD-10-CM

## 2023-10-12 PROCEDURE — 88305 TISSUE EXAM BY PATHOLOGIST: CPT | Performed by: SURGERY

## 2023-10-12 PROCEDURE — 76642 ULTRASOUND BREAST LIMITED: CPT | Performed by: SURGERY

## 2023-10-12 PROCEDURE — 77065 DX MAMMO INCL CAD UNI: CPT | Performed by: SURGERY

## 2023-10-12 PROCEDURE — 19083 BX BREAST 1ST LESION US IMAG: CPT | Performed by: SURGERY

## 2023-10-12 PROCEDURE — 88342 IMHCHEM/IMCYTCHM 1ST ANTB: CPT | Performed by: SURGERY

## 2023-10-12 PROCEDURE — 88341 IMHCHEM/IMCYTCHM EA ADD ANTB: CPT | Performed by: SURGERY

## 2023-10-12 PROCEDURE — 88344 IMHCHEM/IMCYTCHM EA MLT ANTB: CPT | Performed by: SURGERY

## 2023-10-12 RX ORDER — ATORVASTATIN CALCIUM 10 MG/1
TABLET, FILM COATED ORAL
Qty: 90 TABLET | Refills: 0 | Status: SHIPPED | OUTPATIENT
Start: 2023-10-12

## 2023-10-12 RX ORDER — LISINOPRIL AND HYDROCHLOROTHIAZIDE 25; 20 MG/1; MG/1
TABLET ORAL
Qty: 90 TABLET | Refills: 0 | Status: SHIPPED | OUTPATIENT
Start: 2023-10-12

## 2023-10-12 RX ORDER — AMLODIPINE BESYLATE 5 MG/1
7.5 TABLET ORAL DAILY
Qty: 135 TABLET | Refills: 0 | Status: SHIPPED | OUTPATIENT
Start: 2023-10-12

## 2023-10-12 RX ORDER — LISINOPRIL 20 MG/1
TABLET ORAL
Qty: 90 TABLET | Refills: 0 | Status: SHIPPED | OUTPATIENT
Start: 2023-10-12

## 2023-10-12 NOTE — TELEPHONE ENCOUNTER
Called patient and reviewed her recent breast ultrasound results and biopsy recommendations. Discussed that Dr. Katerine Prince will be contacting her after her pathology results to come up with a plan of care for surgery. Patient stated she still wants to proceed with lumpectomy. She thanked me for the phone call and update. Answered patient's questions to the best of my ability. Pt was provided with the breast nurse navigators contact information and was encouraged to phone with any other questions or concerns.

## 2023-10-13 ENCOUNTER — TELEPHONE (OUTPATIENT)
Dept: MAMMOGRAPHY | Facility: HOSPITAL | Age: 63
End: 2023-10-13

## 2023-10-13 NOTE — TELEPHONE ENCOUNTER
This Breast Care RN phoned pt re right breast 1 site and left breast 1 site MRI guided biopsy recommendation by Dr. Romy Patel for enhancement. Procedure reviewed and all questions answered. Reviewed educational handouts. On the day of the biopsy, pt instructed to take Tylenol 1000mg PO, eat a light meal & bring or wear a sports bra. Post biopsy care also reviewed with pt to include NO lifting more than 5lbs, no exercising or housework (limit upper body movement) for 24-48 hrs post biopsy. Patient denies blood thinners, bleeding disorders, liver disease, and chemo. Pt verbalized understanding. Our breast center schedulers will be calling to schedule an appt that is convenient for pt.

## 2023-10-19 ENCOUNTER — HOSPITAL ENCOUNTER (OUTPATIENT)
Dept: MAMMOGRAPHY | Facility: HOSPITAL | Age: 63
Discharge: HOME | End: 2023-10-19
Attending: SURGERY
Payer: COMMERCIAL

## 2023-10-19 ENCOUNTER — HOSPITAL ENCOUNTER (OUTPATIENT)
Dept: MRI IMAGING | Facility: HOSPITAL | Age: 63
Discharge: HOME | End: 2023-10-19
Attending: SURGERY
Payer: COMMERCIAL

## 2023-10-19 DIAGNOSIS — R92.8 ABNORMAL ULTRASOUND OF BREAST: ICD-10-CM

## 2023-10-19 DIAGNOSIS — R92.8 ABNORMAL MAMMOGRAM: ICD-10-CM

## 2023-10-19 PROCEDURE — 19086 BX BREAST ADD LESION MR IMAG: CPT | Performed by: SURGERY

## 2023-10-19 PROCEDURE — A9575 INJ GADOTERATE MEGLUMI 0.1ML: HCPCS | Performed by: SURGERY

## 2023-10-19 PROCEDURE — 19085 BX BREAST 1ST LESION MR IMAG: CPT | Performed by: SURGERY

## 2023-10-19 PROCEDURE — 88305 TISSUE EXAM BY PATHOLOGIST: CPT | Performed by: SURGERY

## 2023-10-19 PROCEDURE — 88342 IMHCHEM/IMCYTCHM 1ST ANTB: CPT | Performed by: SURGERY

## 2023-10-19 PROCEDURE — 77066 DX MAMMO INCL CAD BI: CPT | Performed by: SURGERY

## 2023-10-19 PROCEDURE — 88341 IMHCHEM/IMCYTCHM EA ADD ANTB: CPT | Performed by: SURGERY

## 2023-10-19 RX ORDER — GADOTERATE MEGLUMINE 376.9 MG/ML
20 INJECTION INTRAVENOUS
Status: COMPLETED | OUTPATIENT
Start: 2023-10-19 | End: 2023-10-19

## 2023-10-19 RX ADMIN — GADOTERATE MEGLUMINE 20 ML: 376.9 INJECTION INTRAVENOUS at 08:15:00

## 2023-10-19 NOTE — IMAGING NOTE
Procedure explained throughout and all questions answered. Consent and discharge paperwork signed by Alyssa Chavez. Pt placed prone in comfortable position on MRI table. Left and right  breast positioned in compression. Assisted Dr. Dottie Zelaya with MRI guided biopsy. Pt tolerated well. Pressure held on biopsy site for 10 min by this RN on right breast and left breast compressed by Mira Mario ( MRI staff). No  bleeding noted with no hematoma. Steri strips applied to both breasts. Transported to [facility] via wheelchair. Report to T[facility].

## 2023-10-19 NOTE — IMAGING NOTE
Received patient in stable condition from MRI post bilateral breast MRI biopsy. Steri strips intact. No bleeding from biopsy sites. Patient offered a juice and provided with an ice pack. Patient states that she has a copy of the discharge instructions and has no further questions about them. Post biopsy mammogram views completed. Patient discharged to home in stable condition.

## 2023-10-24 ENCOUNTER — TELEPHONE (OUTPATIENT)
Dept: SURGERY | Facility: CLINIC | Age: 63
End: 2023-10-24

## 2023-10-24 NOTE — TELEPHONE ENCOUNTER
I spoke with the patient regarding her pathology findings. She will think about her surgical plan and I advised her that one of the members of our team will contact her tomorrow to confirm her surgical plan.

## 2023-10-25 ENCOUNTER — TELEPHONE (OUTPATIENT)
Dept: HEMATOLOGY/ONCOLOGY | Facility: HOSPITAL | Age: 63
End: 2023-10-25

## 2023-10-25 NOTE — TELEPHONE ENCOUNTER
Dayan Cancino in regards to discussing her surgical decision about her breast cancer surgery. Awaiting phone call back from patient.

## 2023-10-27 ENCOUNTER — DOCUMENTATION ONLY (OUTPATIENT)
Dept: SURGERY | Facility: CLINIC | Age: 63
End: 2023-10-27

## 2023-10-27 ENCOUNTER — NURSE NAVIGATOR ENCOUNTER (OUTPATIENT)
Dept: HEMATOLOGY/ONCOLOGY | Facility: HOSPITAL | Age: 63
End: 2023-10-27

## 2023-10-27 NOTE — PATIENT INSTRUCTIONS
Dr. Laura Santiago  Tel: [phone]  [facility] [address] [place], [address]  [phone]     Surgery/Procedure: Left breast wire localized lumpectomy, left breast lymphoscintigraphy, left breast sentinel lymph node biopsy. Left breast 2 site wire localized excisional biopsy. Right breast wire localized excisional biopsy. ++Pt to notify staff week of 10/30 regarding final decision       Anesthesia:   Gen  Surgery Length:   1.5 hours CPT:  48079, 35849, 10468, 24942   Wire LOC:   Yes Nuc Med:   Yes Nova Seed:  No       Dx & ICD-10:    Radiology Instructions: Left breast, 9 o'clock position, 3 cm from the nipple, coil shaped clip, biopsy demonstrates invasive ductal carcinoma. Left breast, 8 o'clock position, 4 cm from the nipple, Q shaped clip, biopsy demonstrates intraductal papilloma. Left breast, retroareolar anterior depth, X shaped clip, biopsy demonstrates intraductal papilloma. Right breast, retroareolar mid depth, X shaped clip, biopsy demonstrates intraductal papilloma.         _______________________________________________________________________________    Someone must accompany you the day of the procedure to drive you home safely, because of anesthesia. You will need an adult  to stay with you the first night following your surgery. You must remove any kind of makeup, acrylic nails, lotions, powders, creams or deodorant. [place] ONLY: Pre-admission will give instruct you on when to take Gatorade and Tylenol/acetaminophen prior to your surgery, purchase 2 - 12oz bottles of regular Gatorade (NOT RED/SUGAR FREE). Otherwise, you may not eat or drink anything else after 11PM the night before surgery. [place] ONLY: You may not eat or drink anything after midnight the day of your surgery. Wear comfortable clothing that can be easily removed.   If you wear dentures, contacts lenses, or any prosthesis, you will be asked to remove them.  Do not drink alcohol or smoke 24 hours prior to your procedure. Bring a picture ID and your insurance card. Covid-19 testing is no longer required before surgery unless you are experiencing symptoms such as fever, cough, congestion, etc.   The Pre-Admission Testing Department will call the day before to confirm your procedure, give you the time you need to arrive by and directions on where to go. They begin making calls after 2pm, if you are not contacted by 4pm, please call the surgeon's office listed above. Do not take any blood thinners at least one week prior to the procedure/surgery. This includes aspirin, baby aspirin, Ibuprofen products, herbal supplements, diet medications, vitamin E, fish oil and green tea supplements. Please check other supplements for these ingredients. *TYLENOL or acetaminophen is acceptable*  If you take Coumadin, Plavix, Xarelto, or Eliquis, please contact your prescribing physician for special instructions on how long to hold. If you take insulin contact your primary care physician for special instructions. Our surgery scheduler, Silas Naqvi, will be contacting you to discuss surgery dates. If you have any questions related to scheduling your surgery, please reach out to her at [phone].  _____________________________________________________________________  PRE-OPERATIVE TESTING IF INDICATED BELOW  PLEASE COMPLETE ASAP (AT LEAST 14-21 DAYS PRIOR TO SURGERY)  [] CBC [] BMP [] CMP [] EKG    [] PT, PTT, INR [] Cardiac Clearance  [] H&P Medical Clearance [] Chest X-ray     Please call Central Scheduling to schedule an appointment for pre-operative labs/tests @ [phone]  Does the patient have a pacemaker or ICD? Does the patient have sleep apnea?   [] Yes   [] No                               [] Yes   [] No

## 2023-10-27 NOTE — PROGRESS NOTES
Called patient and discussed her surgery questions about lumpectomy vs mastectomy. Discussed survivorship statistics between the both surgeries and answered her questions about radiation and chemotherapy to the best of my ability. She thanked me for the phone call back and assistance. She stated she is going out of town this weekend but will contact the breast nurse navigators regarding her final decision about her breast cancer surgery. Pt was provided with the breast nurse navigators contact information and was encouraged to phone with any other questions or concerns.

## 2023-10-31 ENCOUNTER — NURSE NAVIGATOR ENCOUNTER (OUTPATIENT)
Dept: HEMATOLOGY/ONCOLOGY | Facility: HOSPITAL | Age: 63
End: 2023-10-31

## 2023-10-31 NOTE — PROGRESS NOTES
Called patient back and we discussed her surgery decision for lumpectomy with Dr. Bethany Crawford. Discussed that mammogram follow-up's after her surgery and answered her questions to the best of my ability. I stated I contacted Dr. Janneth Cobb team and they will contact her to schedule surgery. She thanked me for the phone call back and assistance. Pt was provided with the breast nurse navigators contact information and was encouraged to phone with any other questions or concerns.

## 2023-11-02 DIAGNOSIS — C50.212 MALIGNANT NEOPLASM OF UPPER-INNER QUADRANT OF LEFT BREAST IN FEMALE, ESTROGEN RECEPTOR POSITIVE: Primary | ICD-10-CM

## 2023-11-02 DIAGNOSIS — Z17.0 MALIGNANT NEOPLASM OF UPPER-INNER QUADRANT OF LEFT BREAST IN FEMALE, ESTROGEN RECEPTOR POSITIVE: Primary | ICD-10-CM

## 2023-11-03 ENCOUNTER — TELEPHONE (OUTPATIENT)
Dept: MAMMOGRAPHY | Facility: HOSPITAL | Age: 63
End: 2023-11-03

## 2023-11-03 NOTE — TELEPHONE ENCOUNTER
Attempted to call patient re: sentinel lymph node mapping and breast wire localization procedure education. Message  left for patient to call back.

## 2023-11-03 NOTE — TELEPHONE ENCOUNTER
Spoke with Grey Farooq regarding [place] Lymph Node mapping to be done in nuclear medicine department before lumpectomy surgery scheduled for 11-14-23 with Dr Antolin Alvarez. Also reviewed wire localization to be done in [facility]. Both procedures explained and all questions answered. Pt to be transported to each department by [facility] through [employer]. Will make every effort to ensure privacy and safety when transported between departments. Breast Care Coordinator to provide education and written information regarding lumpectomy surgery, breast cancer and lymphedema. Pt verbalized understanding and had no further questions at this time.

## 2023-11-06 ENCOUNTER — EKG ENCOUNTER (OUTPATIENT)
Dept: LAB | Facility: HOSPITAL | Age: 63
End: 2023-11-06
Attending: FAMILY MEDICINE
Payer: COMMERCIAL

## 2023-11-06 DIAGNOSIS — Z01.818 PRE-OP TESTING: ICD-10-CM

## 2023-11-06 DIAGNOSIS — Z00.00 ROUTINE GENERAL MEDICAL EXAMINATION AT A HEALTH CARE FACILITY: Primary | ICD-10-CM

## 2023-11-06 LAB
ATRIAL RATE: 92 BPM
P AXIS: 60 DEGREES
P-R INTERVAL: 180 MS
Q-T INTERVAL: 378 MS
QRS DURATION: 90 MS
QTC CALCULATION (BEZET): 467 MS
R AXIS: 16 DEGREES
T AXIS: 52 DEGREES
VENTRICULAR RATE: 92 BPM

## 2023-11-06 PROCEDURE — 93005 ELECTROCARDIOGRAM TRACING: CPT

## 2023-11-06 PROCEDURE — 93010 ELECTROCARDIOGRAM REPORT: CPT | Performed by: INTERNAL MEDICINE

## 2023-11-08 ENCOUNTER — LAB ENCOUNTER (OUTPATIENT)
Dept: LAB | Age: 63
End: 2023-11-08
Attending: FAMILY MEDICINE
Payer: COMMERCIAL

## 2023-11-08 DIAGNOSIS — Z01.818 PRE-OP TESTING: ICD-10-CM

## 2023-11-08 DIAGNOSIS — Z00.00 ROUTINE GENERAL MEDICAL EXAMINATION AT A HEALTH CARE FACILITY: ICD-10-CM

## 2023-11-08 LAB
ALBUMIN SERPL-MCNC: 3.6 G/DL (ref 3.4–5)
ALBUMIN/GLOB SERPL: 1 {RATIO} (ref 1–2)
ALP LIVER SERPL-CCNC: 88 U/L
ALT SERPL-CCNC: 23 U/L
ANION GAP SERPL CALC-SCNC: 3 MMOL/L (ref 0–18)
AST SERPL-CCNC: 16 U/L (ref 15–37)
BASOPHILS # BLD AUTO: 0.05 X10(3) UL (ref 0–0.2)
BASOPHILS NFR BLD AUTO: 0.8 %
BILIRUB SERPL-MCNC: 0.9 MG/DL (ref 0.1–2)
BILIRUB UR QL STRIP.AUTO: NEGATIVE
BUN BLD-MCNC: 9 MG/DL (ref 9–23)
CALCIUM BLD-MCNC: 9.6 MG/DL (ref 8.5–10.1)
CHLORIDE SERPL-SCNC: 107 MMOL/L (ref 98–112)
CHOLEST SERPL-MCNC: 146 MG/DL (ref ?–200)
CLARITY UR REFRACT.AUTO: CLEAR
CO2 SERPL-SCNC: 28 MMOL/L (ref 21–32)
COLOR UR AUTO: YELLOW
CREAT BLD-MCNC: 0.6 MG/DL
EGFRCR SERPLBLD CKD-EPI 2021: 102 ML/MIN/1.73M2 (ref 60–?)
EOSINOPHIL # BLD AUTO: 0.22 X10(3) UL (ref 0–0.7)
EOSINOPHIL NFR BLD AUTO: 3.7 %
ERYTHROCYTE [DISTWIDTH] IN BLOOD BY AUTOMATED COUNT: 12.2 %
FASTING PATIENT LIPID ANSWER: YES
FASTING STATUS PATIENT QL REPORTED: YES
GLOBULIN PLAS-MCNC: 3.6 G/DL (ref 2.8–4.4)
GLUCOSE BLD-MCNC: 117 MG/DL (ref 70–99)
GLUCOSE UR STRIP.AUTO-MCNC: NORMAL MG/DL
HCT VFR BLD AUTO: 40.8 %
HDLC SERPL-MCNC: 57 MG/DL (ref 40–59)
HGB BLD-MCNC: 13 G/DL
IMM GRANULOCYTES # BLD AUTO: 0.01 X10(3) UL (ref 0–1)
IMM GRANULOCYTES NFR BLD: 0.2 %
KETONES UR STRIP.AUTO-MCNC: NEGATIVE MG/DL
LDLC SERPL CALC-MCNC: 74 MG/DL (ref ?–100)
LEUKOCYTE ESTERASE UR QL STRIP.AUTO: NEGATIVE
LYMPHOCYTES # BLD AUTO: 1.4 X10(3) UL (ref 1–4)
LYMPHOCYTES NFR BLD AUTO: 23.7 %
MCH RBC QN AUTO: 30.8 PG (ref 26–34)
MCHC RBC AUTO-ENTMCNC: 31.9 G/DL (ref 31–37)
MCV RBC AUTO: 96.7 FL
MONOCYTES # BLD AUTO: 0.41 X10(3) UL (ref 0.1–1)
MONOCYTES NFR BLD AUTO: 6.9 %
NEUTROPHILS # BLD AUTO: 3.82 X10 (3) UL (ref 1.5–7.7)
NEUTROPHILS # BLD AUTO: 3.82 X10(3) UL (ref 1.5–7.7)
NEUTROPHILS NFR BLD AUTO: 64.7 %
NITRITE UR QL STRIP.AUTO: NEGATIVE
NONHDLC SERPL-MCNC: 89 MG/DL (ref ?–130)
OSMOLALITY SERPL CALC.SUM OF ELEC: 286 MOSM/KG (ref 275–295)
PH UR STRIP.AUTO: 7.5 [PH] (ref 5–8)
PLATELET # BLD AUTO: 259 10(3)UL (ref 150–450)
POTASSIUM SERPL-SCNC: 4 MMOL/L (ref 3.5–5.1)
PROT SERPL-MCNC: 7.2 G/DL (ref 6.4–8.2)
PROT UR STRIP.AUTO-MCNC: 20 MG/DL
RBC # BLD AUTO: 4.22 X10(6)UL
RBC UR QL AUTO: NEGATIVE
SODIUM SERPL-SCNC: 138 MMOL/L (ref 136–145)
SP GR UR STRIP.AUTO: 1.02 (ref 1–1.03)
TRIGL SERPL-MCNC: 78 MG/DL (ref 30–149)
TSI SER-ACNC: 2.32 MIU/ML (ref 0.36–3.74)
UROBILINOGEN UR STRIP.AUTO-MCNC: 3 MG/DL
VLDLC SERPL CALC-MCNC: 12 MG/DL (ref 0–30)
WBC # BLD AUTO: 5.9 X10(3) UL (ref 4–11)

## 2023-11-08 PROCEDURE — 83036 HEMOGLOBIN GLYCOSYLATED A1C: CPT | Performed by: FAMILY MEDICINE

## 2023-11-08 PROCEDURE — 85025 COMPLETE CBC W/AUTO DIFF WBC: CPT

## 2023-11-08 PROCEDURE — 80053 COMPREHEN METABOLIC PANEL: CPT

## 2023-11-08 PROCEDURE — 84443 ASSAY THYROID STIM HORMONE: CPT

## 2023-11-08 PROCEDURE — 80061 LIPID PANEL: CPT

## 2023-11-08 PROCEDURE — 36415 COLL VENOUS BLD VENIPUNCTURE: CPT

## 2023-11-08 PROCEDURE — 81001 URINALYSIS AUTO W/SCOPE: CPT | Performed by: FAMILY MEDICINE

## 2023-11-09 ENCOUNTER — OFFICE VISIT (OUTPATIENT)
Dept: FAMILY MEDICINE CLINIC | Facility: CLINIC | Age: 63
End: 2023-11-09
Payer: COMMERCIAL

## 2023-11-09 ENCOUNTER — PATIENT MESSAGE (OUTPATIENT)
Dept: FAMILY MEDICINE CLINIC | Facility: CLINIC | Age: 63
End: 2023-11-09

## 2023-11-09 VITALS
SYSTOLIC BLOOD PRESSURE: 150 MMHG | BODY MASS INDEX: 38.55 KG/M2 | RESPIRATION RATE: 16 BRPM | HEART RATE: 90 BPM | HEIGHT: 68 IN | DIASTOLIC BLOOD PRESSURE: 50 MMHG | TEMPERATURE: 98 F | WEIGHT: 254.38 LBS

## 2023-11-09 DIAGNOSIS — R05.1 ACUTE COUGH: ICD-10-CM

## 2023-11-09 DIAGNOSIS — R73.09 ELEVATED GLUCOSE: ICD-10-CM

## 2023-11-09 DIAGNOSIS — Z17.0 MALIGNANT NEOPLASM OF UPPER-INNER QUADRANT OF LEFT BREAST IN FEMALE, ESTROGEN RECEPTOR POSITIVE: ICD-10-CM

## 2023-11-09 DIAGNOSIS — Z01.818 PREOPERATIVE CLEARANCE: Primary | ICD-10-CM

## 2023-11-09 DIAGNOSIS — R82.90 ABNORMAL URINALYSIS: ICD-10-CM

## 2023-11-09 DIAGNOSIS — C50.212 MALIGNANT NEOPLASM OF UPPER-INNER QUADRANT OF LEFT BREAST IN FEMALE, ESTROGEN RECEPTOR POSITIVE: ICD-10-CM

## 2023-11-09 LAB
EST. AVERAGE GLUCOSE BLD GHB EST-MCNC: 117 MG/DL (ref 68–126)
HBA1C MFR BLD: 5.7 % (ref ?–5.7)

## 2023-11-09 PROCEDURE — 3078F DIAST BP <80 MM HG: CPT | Performed by: FAMILY MEDICINE

## 2023-11-09 PROCEDURE — 99214 OFFICE O/P EST MOD 30 MIN: CPT | Performed by: FAMILY MEDICINE

## 2023-11-09 PROCEDURE — 3077F SYST BP >= 140 MM HG: CPT | Performed by: FAMILY MEDICINE

## 2023-11-09 PROCEDURE — 3008F BODY MASS INDEX DOCD: CPT | Performed by: FAMILY MEDICINE

## 2023-11-09 NOTE — PATIENT INSTRUCTIONS
Repeat 1st AM urine - make sure well hydrated day prior.  Avoid strenuous exercise day prior to test.

## 2023-11-10 PROBLEM — E74.39 GLUCOSE INTOLERANCE: Status: ACTIVE | Noted: 2023-11-10

## 2023-11-10 LAB
COVID19 BINAX NOW ANTIGEN: NOT DETECTED
OPERATOR ID: NORMAL

## 2023-11-13 ENCOUNTER — TELEPHONE (OUTPATIENT)
Dept: FAMILY MEDICINE CLINIC | Facility: CLINIC | Age: 63
End: 2023-11-13

## 2023-11-13 NOTE — TELEPHONE ENCOUNTER
Pt dropped off Physician Results Form to be filled out and faxed by Alan Hamilton by 11/17/2023.      Fax #: [phone]

## 2023-11-14 ENCOUNTER — ANESTHESIA (OUTPATIENT)
Dept: SURGERY | Facility: HOSPITAL | Age: 63
End: 2023-11-14
Payer: COMMERCIAL

## 2023-11-14 ENCOUNTER — HOSPITAL ENCOUNTER (OUTPATIENT)
Facility: HOSPITAL | Age: 63
Setting detail: HOSPITAL OUTPATIENT SURGERY
Discharge: HOME | End: 2023-11-14
Attending: SURGERY | Admitting: SURGERY
Payer: COMMERCIAL

## 2023-11-14 ENCOUNTER — ANESTHESIA EVENT (OUTPATIENT)
Dept: SURGERY | Facility: HOSPITAL | Age: 63
End: 2023-11-14
Payer: COMMERCIAL

## 2023-11-14 ENCOUNTER — HOSPITAL ENCOUNTER (OUTPATIENT)
Dept: NUCLEAR MEDICINE | Facility: HOSPITAL | Age: 63
Discharge: HOME | End: 2023-11-14
Attending: SURGERY
Payer: COMMERCIAL

## 2023-11-14 ENCOUNTER — HOSPITAL ENCOUNTER (OUTPATIENT)
Dept: MAMMOGRAPHY | Facility: HOSPITAL | Age: 63
Discharge: HOME | End: 2023-11-14
Attending: SURGERY
Payer: COMMERCIAL

## 2023-11-14 VITALS
SYSTOLIC BLOOD PRESSURE: 148 MMHG | HEART RATE: 76 BPM | WEIGHT: 252 LBS | HEIGHT: 68 IN | OXYGEN SATURATION: 96 % | RESPIRATION RATE: 14 BRPM | DIASTOLIC BLOOD PRESSURE: 81 MMHG | TEMPERATURE: 98 F | BODY MASS INDEX: 38.19 KG/M2

## 2023-11-14 DIAGNOSIS — C50.212 MALIGNANT NEOPLASM OF UPPER-INNER QUADRANT OF LEFT BREAST IN FEMALE, ESTROGEN RECEPTOR POSITIVE: ICD-10-CM

## 2023-11-14 DIAGNOSIS — Z17.0 MALIGNANT NEOPLASM OF UPPER-INNER QUADRANT OF LEFT BREAST IN FEMALE, ESTROGEN RECEPTOR POSITIVE: ICD-10-CM

## 2023-11-14 DIAGNOSIS — Z17.0 MALIGNANT NEOPLASM OF UPPER-INNER QUADRANT OF LEFT BREAST IN FEMALE, ESTROGEN RECEPTOR POSITIVE: Primary | ICD-10-CM

## 2023-11-14 DIAGNOSIS — C50.212 MALIGNANT NEOPLASM OF UPPER-INNER QUADRANT OF LEFT BREAST IN FEMALE, ESTROGEN RECEPTOR POSITIVE: Primary | ICD-10-CM

## 2023-11-14 PROCEDURE — 0HBU0ZZ EXCISION OF LEFT BREAST, OPEN APPROACH: ICD-10-PCS | Performed by: SURGERY

## 2023-11-14 PROCEDURE — 78195 LYMPH SYSTEM IMAGING: CPT | Performed by: SURGERY

## 2023-11-14 PROCEDURE — 88305 TISSUE EXAM BY PATHOLOGIST: CPT | Performed by: SURGERY

## 2023-11-14 PROCEDURE — 0HBV0ZZ EXCISION OF BILATERAL BREAST, OPEN APPROACH: ICD-10-PCS | Performed by: SURGERY

## 2023-11-14 PROCEDURE — 76098 X-RAY EXAM SURGICAL SPECIMEN: CPT | Performed by: SURGERY

## 2023-11-14 PROCEDURE — 88341 IMHCHEM/IMCYTCHM EA ADD ANTB: CPT | Performed by: SURGERY

## 2023-11-14 PROCEDURE — 19282 PERQ DEVICE BREAST EA IMAG: CPT | Performed by: SURGERY

## 2023-11-14 PROCEDURE — 07B60ZX EXCISION OF LEFT AXILLARY LYMPHATIC, OPEN APPROACH, DIAGNOSTIC: ICD-10-PCS | Performed by: SURGERY

## 2023-11-14 PROCEDURE — 88307 TISSUE EXAM BY PATHOLOGIST: CPT | Performed by: SURGERY

## 2023-11-14 PROCEDURE — 88342 IMHCHEM/IMCYTCHM 1ST ANTB: CPT | Performed by: SURGERY

## 2023-11-14 PROCEDURE — 88360 TUMOR IMMUNOHISTOCHEM/MANUAL: CPT | Performed by: SURGERY

## 2023-11-14 PROCEDURE — 19281 PERQ DEVICE BREAST 1ST IMAG: CPT | Performed by: SURGERY

## 2023-11-14 RX ORDER — LIDOCAINE HYDROCHLORIDE 10 MG/ML
INJECTION, SOLUTION EPIDURAL; INFILTRATION; INTRACAUDAL; PERINEURAL AS NEEDED
Status: DISCONTINUED | OUTPATIENT
Start: 2023-11-14 | End: 2023-11-14 | Stop reason: SURG

## 2023-11-14 RX ORDER — HYDROCODONE BITARTRATE AND ACETAMINOPHEN 5; 325 MG/1; MG/1
1-2 TABLET ORAL EVERY 6 HOURS PRN
Qty: 20 TABLET | Refills: 0 | Status: SHIPPED | OUTPATIENT
Start: 2023-11-14

## 2023-11-14 RX ORDER — SCOLOPAMINE TRANSDERMAL SYSTEM 1 MG/1
1 PATCH, EXTENDED RELEASE TRANSDERMAL ONCE
Status: DISCONTINUED | OUTPATIENT
Start: 2023-11-14 | End: 2023-11-14 | Stop reason: HOSPADM

## 2023-11-14 RX ORDER — NALOXONE HYDROCHLORIDE 0.4 MG/ML
0.08 INJECTION, SOLUTION INTRAMUSCULAR; INTRAVENOUS; SUBCUTANEOUS AS NEEDED
Status: DISCONTINUED | OUTPATIENT
Start: 2023-11-14 | End: 2023-11-14

## 2023-11-14 RX ORDER — ACETAMINOPHEN 500 MG
1000 TABLET ORAL ONCE AS NEEDED
Status: DISCONTINUED | OUTPATIENT
Start: 2023-11-14 | End: 2023-11-14

## 2023-11-14 RX ORDER — METOCLOPRAMIDE HYDROCHLORIDE 5 MG/ML
INJECTION INTRAMUSCULAR; INTRAVENOUS AS NEEDED
Status: DISCONTINUED | OUTPATIENT
Start: 2023-11-14 | End: 2023-11-14 | Stop reason: SURG

## 2023-11-14 RX ORDER — SODIUM CHLORIDE, SODIUM LACTATE, POTASSIUM CHLORIDE, CALCIUM CHLORIDE 600; 310; 30; 20 MG/100ML; MG/100ML; MG/100ML; MG/100ML
INJECTION, SOLUTION INTRAVENOUS CONTINUOUS
Status: DISCONTINUED | OUTPATIENT
Start: 2023-11-14 | End: 2023-11-14

## 2023-11-14 RX ORDER — ACETAMINOPHEN 500 MG
1000 TABLET ORAL ONCE
Status: DISCONTINUED | OUTPATIENT
Start: 2023-11-14 | End: 2023-11-14 | Stop reason: HOSPADM

## 2023-11-14 RX ORDER — BUPIVACAINE HYDROCHLORIDE 5 MG/ML
INJECTION, SOLUTION EPIDURAL; INTRACAUDAL AS NEEDED
Status: DISCONTINUED | OUTPATIENT
Start: 2023-11-14 | End: 2023-11-14 | Stop reason: HOSPADM

## 2023-11-14 RX ORDER — PHENYLEPHRINE HCL 10 MG/ML
VIAL (ML) INJECTION AS NEEDED
Status: DISCONTINUED | OUTPATIENT
Start: 2023-11-14 | End: 2023-11-14 | Stop reason: SURG

## 2023-11-14 RX ORDER — DIAZEPAM 5 MG/1
5 TABLET ORAL AS NEEDED
Status: DISCONTINUED | OUTPATIENT
Start: 2023-11-14 | End: 2023-11-14 | Stop reason: HOSPADM

## 2023-11-14 RX ORDER — HYDROMORPHONE HYDROCHLORIDE 1 MG/ML
0.2 INJECTION, SOLUTION INTRAMUSCULAR; INTRAVENOUS; SUBCUTANEOUS EVERY 5 MIN PRN
Status: DISCONTINUED | OUTPATIENT
Start: 2023-11-14 | End: 2023-11-14

## 2023-11-14 RX ORDER — CEFAZOLIN SODIUM/WATER 2 G/20 ML
2 SYRINGE (ML) INTRAVENOUS ONCE
Status: COMPLETED | OUTPATIENT
Start: 2023-11-14 | End: 2023-11-14

## 2023-11-14 RX ORDER — MIDAZOLAM HYDROCHLORIDE 1 MG/ML
INJECTION INTRAMUSCULAR; INTRAVENOUS AS NEEDED
Status: DISCONTINUED | OUTPATIENT
Start: 2023-11-14 | End: 2023-11-14 | Stop reason: SURG

## 2023-11-14 RX ORDER — KETOROLAC TROMETHAMINE 30 MG/ML
INJECTION, SOLUTION INTRAMUSCULAR; INTRAVENOUS AS NEEDED
Status: DISCONTINUED | OUTPATIENT
Start: 2023-11-14 | End: 2023-11-14 | Stop reason: SURG

## 2023-11-14 RX ORDER — PROCHLORPERAZINE EDISYLATE 5 MG/ML
5 INJECTION INTRAMUSCULAR; INTRAVENOUS EVERY 8 HOURS PRN
Status: DISCONTINUED | OUTPATIENT
Start: 2023-11-14 | End: 2023-11-14

## 2023-11-14 RX ORDER — HYDROCODONE BITARTRATE AND ACETAMINOPHEN 5; 325 MG/1; MG/1
1 TABLET ORAL ONCE AS NEEDED
Status: DISCONTINUED | OUTPATIENT
Start: 2023-11-14 | End: 2023-11-14

## 2023-11-14 RX ORDER — DEXAMETHASONE SODIUM PHOSPHATE 4 MG/ML
VIAL (ML) INJECTION AS NEEDED
Status: DISCONTINUED | OUTPATIENT
Start: 2023-11-14 | End: 2023-11-14 | Stop reason: SURG

## 2023-11-14 RX ORDER — LIDOCAINE HYDROCHLORIDE AND EPINEPHRINE 10; 10 MG/ML; UG/ML
INJECTION, SOLUTION INFILTRATION; PERINEURAL AS NEEDED
Status: DISCONTINUED | OUTPATIENT
Start: 2023-11-14 | End: 2023-11-14 | Stop reason: HOSPADM

## 2023-11-14 RX ORDER — ONDANSETRON 2 MG/ML
4 INJECTION INTRAMUSCULAR; INTRAVENOUS EVERY 6 HOURS PRN
Status: DISCONTINUED | OUTPATIENT
Start: 2023-11-14 | End: 2023-11-14

## 2023-11-14 RX ORDER — HYDROMORPHONE HYDROCHLORIDE 1 MG/ML
0.6 INJECTION, SOLUTION INTRAMUSCULAR; INTRAVENOUS; SUBCUTANEOUS EVERY 5 MIN PRN
Status: DISCONTINUED | OUTPATIENT
Start: 2023-11-14 | End: 2023-11-14

## 2023-11-14 RX ORDER — HYDROCODONE BITARTRATE AND ACETAMINOPHEN 5; 325 MG/1; MG/1
2 TABLET ORAL ONCE AS NEEDED
Status: DISCONTINUED | OUTPATIENT
Start: 2023-11-14 | End: 2023-11-14

## 2023-11-14 RX ORDER — ONDANSETRON 2 MG/ML
INJECTION INTRAMUSCULAR; INTRAVENOUS AS NEEDED
Status: DISCONTINUED | OUTPATIENT
Start: 2023-11-14 | End: 2023-11-14 | Stop reason: SURG

## 2023-11-14 RX ORDER — HYDROMORPHONE HYDROCHLORIDE 1 MG/ML
0.4 INJECTION, SOLUTION INTRAMUSCULAR; INTRAVENOUS; SUBCUTANEOUS EVERY 5 MIN PRN
Status: DISCONTINUED | OUTPATIENT
Start: 2023-11-14 | End: 2023-11-14

## 2023-11-14 RX ORDER — LIDOCAINE AND PRILOCAINE 25; 25 MG/G; MG/G
CREAM TOPICAL ONCE
Status: COMPLETED | OUTPATIENT
Start: 2023-11-14 | End: 2023-11-14

## 2023-11-14 RX ADMIN — CEFAZOLIN SODIUM/WATER 2 G: 2 G/20 ML SYRINGE (ML) INTRAVENOUS at 15:14:00

## 2023-11-14 RX ADMIN — ONDANSETRON 4 MG: 2 INJECTION INTRAMUSCULAR; INTRAVENOUS at 15:13:00

## 2023-11-14 RX ADMIN — METOCLOPRAMIDE HYDROCHLORIDE 10 MG: 5 INJECTION INTRAMUSCULAR; INTRAVENOUS at 15:13:00

## 2023-11-14 RX ADMIN — DEXAMETHASONE SODIUM PHOSPHATE 4 MG: 4 MG/ML VIAL (ML) INJECTION at 15:13:00

## 2023-11-14 RX ADMIN — PHENYLEPHRINE HCL 50 MCG: 10 MG/ML VIAL (ML) INJECTION at 15:55:00

## 2023-11-14 RX ADMIN — MIDAZOLAM HYDROCHLORIDE 2 MG: 1 INJECTION INTRAMUSCULAR; INTRAVENOUS at 15:02:00

## 2023-11-14 RX ADMIN — LIDOCAINE HYDROCHLORIDE 50 MG: 10 INJECTION, SOLUTION EPIDURAL; INFILTRATION; INTRACAUDAL; PERINEURAL at 15:02:00

## 2023-11-14 RX ADMIN — SODIUM CHLORIDE, SODIUM LACTATE, POTASSIUM CHLORIDE, CALCIUM CHLORIDE: 600; 310; 30; 20 INJECTION, SOLUTION INTRAVENOUS at 15:02:00

## 2023-11-14 RX ADMIN — KETOROLAC TROMETHAMINE 30 MG: 30 INJECTION, SOLUTION INTRAMUSCULAR; INTRAVENOUS at 16:26:00

## 2023-11-14 RX ADMIN — SODIUM CHLORIDE, SODIUM LACTATE, POTASSIUM CHLORIDE, CALCIUM CHLORIDE: 600; 310; 30; 20 INJECTION, SOLUTION INTRAVENOUS at 16:37:00

## 2023-11-14 NOTE — IMAGING NOTE
Assisted  with mammography guided needle localization of bilateral breasts. Radha Santos identified with spelling of name and date of birth. Medications and allergies reviewed. NKDA reported    History: Left breast -Invasive ductal carcinoma -intraductal papilloma,  Right breast -intraductal papilloma   Surgery: Left breast wire localized lumpectomy, left breast lymphoscintigraphy, left breast sentinel lymph node biopsy. Left breast two site wire localized excisional biopsy. Right breast wire localized excisional biopsy. Order verified. Procedure explained and questions answered. Radha Santos verbalized understanding and agreement. 1329: Written consent obtained. 1336: Scans taken by Austen Bean- mammography technologist    7540: Dr. Yordy Cespedes  present    [phone]: Time out complete. Site #1 Right Breast-retroareolar  1337: Site prepped in a sterile manner  1340: Lidocaine administered for anesthetic affect. 1341: Jane 20G x 7.5 cm needle placed-retroareolar mid depth, X shaped clip, biopsy demonstrates intraductal papilloma. Site cleaned. Wire secured with blue clip, steri strips, sterile 4x4 gauze dressing, and Tegaderm. 1356: Time out complete    Site #2  Left Breast   1355: Site prepped in a sterile manner. 1357: Lidocaine administered for anesthetic affect. 1357: Jane 20G x 5 cm needle placed-8 o'clock position, 4 cm from the nipple, Q shaped clip, biopsy demonstrates intraductal papilloma. Site #3  Left Breast   1355: Site prepped in a sterile manner. 1359: Lidocaine administered for anesthetic affect. 1359: Jane 20G x 5 cm needle placed-retroareolar anterior depth, X shaped clip, biopsy demonstrates intraductal papilloma. Site #4  Left Breast  1355: Site prepped in a sterile manner. 1400: Lidocaine administered for anesthetic affect.   1401: Jane 20G x 3 cm needle placed-9 o'clock position, 3 cm from the nipple, coil shaped clip, biopsy demonstrates invasive ductal carcinoma. Sonido Gastelum tolerated procedure well. Sites cleaned. Wires secured with blue clips, steri strips, sterile 4x4 gauze dressing, and Tegaderm. 1425: Sonido Gastelum transported via wheelchair to surgery holding in stable condition. Ms. Patricia Velarde without complaints or concerns at this time.

## 2023-11-14 NOTE — BRIEF OP NOTE
Pre-Operative Diagnosis: Malignant neoplasm of upper-inner quadrant of left breast in female, estrogen receptor positive  [C50.212, Z17.0]     Post-Operative Diagnosis: Malignant neoplasm of upper-inner quadrant of left breast in female, estrogen receptor positive [C50.212, Z17.0]      Procedure Performed:   Left breast wire localized lumpectomy, left breast lymphoscintigraphy, left breast sentinel lymph node biopsy. Left breast two site wire localized excisional biopsy. Right breast wire localized excisional biopsy.     Surgeon(s) and Role:     Ludwig Marcos MD - Primary    Assistant(s):  Surgical Assistant.: Nasir Mccann     Surgical Findings: Coil clip at 9:00 tumor site not on xray, mass palpable and wide margins obtained, L SLN x2, all 3 excisional biopsy clips identified on xray     Specimen: R lumpectomy, L medial excision papilloma, L lateral excision papilloma, L lumpectomy, L margins x6, L SLN x1     Estimated Blood Loss: 10cc    Rossy Jacob MD  11/14/2023  2:48 PM

## 2023-11-15 ENCOUNTER — TELEPHONE (OUTPATIENT)
Dept: HEMATOLOGY/ONCOLOGY | Facility: HOSPITAL | Age: 63
End: 2023-11-15

## 2023-11-15 NOTE — TELEPHONE ENCOUNTER
Called patient, post op day #1. States that overall doing well, pain is well controlled. She has looked at incisions and no signs of infection. Is aware of her surgical follow up appointment. Scheduled with medical oncology. Discussed timeline that med onc is first then RT. Discussed possibility of Oncotype testing. Phone number provided and encouraged patient to call back with any questions or concerns.

## 2023-11-15 NOTE — OPERATIVE REPORT
[facility]    PATIENT'S NAME: Gilma Cadena   ATTENDING PHYSICIAN: Shakeel Washburn M.D. OPERATING PHYSICIAN: Shakeel Washburn M.D. 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 PROCEDURE:  Left breast wire-localized lumpectomy with left breast excision 2-site papillomas, left breast specimen radiography x3, with left breast advancement flap mastopexy for a defect measuring 16 sq cm, left breast injection of blue dye for sentinel lymph node identification, left sentinel lymph node biopsy, and right breast wire-localized lumpectomy, with right breast specimen radiography. ASSISTANT:  Sarah Luu CSA    ANESTHESIA:  General anesthesia and local.    ESTIMATED BLOOD LOSS:  10 mL. DRAINS:  None. COMPLICATIONS:  None. DISPOSITION:  Stable on transfer to the recovery room. INDICATIONS:  This patient is a 68-year-old female who presented with imaging-detected concern of the left breast.  She had a biopsy-confirmed diagnosis of invasive carcinoma. We did further workup that confirmed other suspicious enhancements on her MRI and further biopsies demonstrated additional papillary lesions. We discussed standard of care would be excision of these, and she was motivated for a breast-conserving approach of the left breast cancer. We discussed the need to achieve free margins, the possibility of re-excision to achieve free margins, as well as the possible need for postoperative systemic and/or radiation therapy. We discussed formal excision of the papillomas to exclude atypia and/or intraductal malignancy in these areas.   We discussed the approach to drake staging including the technique of sentinel node biopsy, possible need for axillary dissection, possible long-term sequelae of the procedure, and she agreed to proceed. OPERATIVE TECHNIQUE:  The patient was brought to the imaging suite. She underwent a wire localization of all the aforementioned areas of concern including the 2 papillary lesions in the left and the biopsy-confirmed carcinoma. She also underwent localization of the area of concern at the right breast papilloma. Following this, she underwent injection of radioisotope as well as lymphoscintigraphy for sentinel lymph node identification preoperatively to the left breast in our nuclear medicine department. She was then brought to the OR, placed in supine position, properly padded and secured, given a dose of IV antibiotics, and sequential compression devices were applied to her legs for DVT prophylaxis. General anesthesia was induced. Bilateral breasts were prepped and draped in usual sterile fashion. Attention was first taken to the left breast.  The area was anesthetized with lidocaine 1% with epinephrine at the inferior aspect of the left axillary hairline after confirming uptake with a handheld gamma counter and a 15-blade knife was used to incise the skin. Using sharp dissection and electrocautery, I dissected through the various layers of the axilla including dissection through the clavipectoral fascia to the deep axilla where a sentinel node was identified and harvested. Her sentinel node was removed, sent for permanent pathologic evaluation. Wound was irrigated, hemostasis assured with electrocautery and hemoclips, and closed with an interrupted 3-0 Vicryl for deep layer, a running 4-0 subcuticular Monocryl for skin. Sterile dressing, Mastisol and Steri-Strips were applied, and 0.5% Marcaine was instilled in the cavity to assist with postoperative analgesia.       Attention was taken toward the left breast.  Lidocaine 1% with epinephrine was used to infiltrate the skin and subcutaneous tissue at the targeted incision site. A curvilinear incision was made along the medial areolar border with a 15-blade knife through the skin. I excised the lateral papillary lesion. This was removed, placed in the imaging device labeled as short stitch, single clip superiorly and long stitch, double clip laterally as lateral papillary lesion, and her specimen x-ray confirmed the presence biopsy clip, adequate margins, and this was submitted for permanent pathologic evaluation. Through the same incision, I tunnel toward the medial area of the papillary lesion. This was excised, oriented with a short stitch, single clip superiorly, long stitch, double clip laterally in order to allow for appropriate pathological margins and specimen review. It was then placed in the imaging device where specimen x-ray confirmed the presence of the biopsy area clip with adequate margins as deemed by myself. She was noted at the 9 o'clock area to have a lot of bleeding from the wire localization and prior biopsy. As such, identification of the 9 o'clock trajectory was found to be difficult. I identified a palpable mass in this area consistent in size and location with her biopsy-confirmed carcinoma at the 9 o'clock position. Therefore, sharp dissection and electrocautery used to excise the specimen formally. During this dissection, we also encountered a tremendous amount of bleeding. This was excised, placed in the specimen x-ray where I identified the presence of a spiculated mass with internal calcifications. There was no visualized coiled clip. This was presumed to have been suctioned during the dissection given the large amount of bleeding. Inspection of the cavity demonstrated no residual clip in this area.   I took 6 margins from the interior of this cavity, each oriented with a clip at the true margin, individually labeled, and sent for permanent pathologic evaluation. The 9 o'clock cancer lumpectomy was labeled as short stitch, single clip superiorly, long stitch, double clip laterally in order to allow for appropriate pathological margins and specimen review. Clips were placed back within the cavity to assist with subsequent surveillance. She was found have a large defect in this area measuring 16 sq cm which required an advancement flap mastopexy to allow for adequate wound healing and cosmesis. This required that I place a counterincisions through the immediate adjacent superolateral breast parenchyma down to the level of the pectoralis fascia. Via superior vascular pedicle, tissue was then mobilized into the aforementioned defect and secured to the level of the pectoralis fascia with a running 3-0 PDS suture. This wound was then irrigated and hemostasis assured with electrocautery. Closure of the wound was accomplished with interrupted 3-0 Vicryl for deep layer, running 4-0 subcuticular Monocryl for skin. Mastisol and Steri-Strips were applied, and 0.5% Marcaine was instilled in the cavity to assist with postoperative analgesia. Attention was taken toward the right breast.  Lidocaine 1% with epinephrine was used to infiltrate the skin and subcutaneous tissues at the targeted incision site. A curvilinear incision was made along the lateral areolar border with a 15-blade knife through the skin. The wire was identified, brought into the field, and using sharp dissection and electrocautery, a segment of breast tissue surrounding the tip of the wire was carefully excised and oriented with a short stitch, single clip superiorly, long stitch, double clip laterally in order to allow for appropriate pathological margin and specimen review.   It was then placed in the imaging device where specimen x-ray confirmed the presence of the targeted biopsy clip and site with adequate margins as deemed by myself, and a clip was then placed within the cavity to assist with subsequent surveillance. This wound was irrigated, hemostasis assured with electrocautery, and closure was accomplished with an interrupted 3-0 Vicryl for deep layer and a running 4-0 subcuticular Monocryl for skin. Mastisol and Steri-Strips were applied, and 0.5% Marcaine was instilled in the cavity to assist with postoperative analgesia. A sterile dressing and compression bra were placed. Her blood loss was minimal.  All counts were correct at the conclusion of the procedure. She tolerated the procedure well. She was transferred to the recovery room in stable condition. Dictated By Rhonda Rolle. Ly Kapoor M.D.  d: 11/15/2023 09:15:51  t: 11/15/2023 12:39:05  Bob Gross [id number]  CM/    cc: Arlet Hall. MD Terra Crowder    [place] Node Biopsy for Breast Cancer - Left  Operation performed with curative intent. Yes   Tracer(s) used to identify sentinel nodes in the upfront surgery (non-neoadjuvant) setting (select all that apply). Dye and Radioactive tracer   Tracer(s) used to identify sentinel nodes in the neoadjuvant setting (select all that apply). N/A   All nodes (colored or non-colored) present at the end of a dye-filled lymphatic channel were removed. Yes   All significantly radioactive nodes were removed. Yes   All palpably suspicious nodes were removed. Yes   Biopsy-proven positive nodes marked with clips prior to chemotherapy were identified and removed.  N/A

## 2023-11-18 ENCOUNTER — MED REC SCAN ONLY (OUTPATIENT)
Dept: FAMILY MEDICINE CLINIC | Facility: CLINIC | Age: 63
End: 2023-11-18

## 2023-11-21 ENCOUNTER — NURSE NAVIGATOR ENCOUNTER (OUTPATIENT)
Dept: HEMATOLOGY/ONCOLOGY | Facility: HOSPITAL | Age: 63
End: 2023-11-21

## 2023-11-21 ENCOUNTER — OFFICE VISIT (OUTPATIENT)
Dept: SURGERY | Facility: CLINIC | Age: 63
End: 2023-11-21
Payer: COMMERCIAL

## 2023-11-21 VITALS
BODY MASS INDEX: 38 KG/M2 | TEMPERATURE: 98 F | OXYGEN SATURATION: 96 % | SYSTOLIC BLOOD PRESSURE: 150 MMHG | DIASTOLIC BLOOD PRESSURE: 86 MMHG | HEART RATE: 76 BPM | RESPIRATION RATE: 16 BRPM | WEIGHT: 252 LBS

## 2023-11-21 DIAGNOSIS — Z17.0 MALIGNANT NEOPLASM OF UPPER-INNER QUADRANT OF LEFT BREAST IN FEMALE, ESTROGEN RECEPTOR POSITIVE: Primary | ICD-10-CM

## 2023-11-21 DIAGNOSIS — C50.212 MALIGNANT NEOPLASM OF UPPER-INNER QUADRANT OF LEFT BREAST IN FEMALE, ESTROGEN RECEPTOR POSITIVE: Primary | ICD-10-CM

## 2023-11-21 NOTE — PROGRESS NOTES
Called patient and verified her full name and  and stated to her that Dr. Taylor Mcclain wanted me to contact her in regards to her pathology results and plan of care. Provided her with the final pathology results of her lumpectomy surgery with Dr. Taylor Mcclain and stage of her breast cancer. Stated to her that her margins were close but otherwise negative and we discussed her case today during the multidisciplinary conference. I stated that Dr. Eneida Adorno recommended Oncotype testing for possible chemotherapy and will discuss this and endocrine therapy with her next week during her appointment. I stated that the radiation department will contact her to arrange an appointment with the radiation oncologist. I stated that the team is multidisciplinary and they will all coordinate together for her plan of care. Answered patient's questions to the best of my ability. She thanked me for the phone call and assistance. Pt was provided with the breast nurse navigators contact information and was encouraged to phone with any other questions or concerns.

## 2023-11-28 ENCOUNTER — OFFICE VISIT (OUTPATIENT)
Dept: HEMATOLOGY/ONCOLOGY | Facility: HOSPITAL | Age: 63
End: 2023-11-28
Attending: SPECIALIST
Payer: COMMERCIAL

## 2023-11-28 ENCOUNTER — NURSE NAVIGATOR ENCOUNTER (OUTPATIENT)
Dept: HEMATOLOGY/ONCOLOGY | Facility: HOSPITAL | Age: 63
End: 2023-11-28

## 2023-11-28 VITALS
WEIGHT: 254.5 LBS | BODY MASS INDEX: 38.57 KG/M2 | HEART RATE: 91 BPM | DIASTOLIC BLOOD PRESSURE: 79 MMHG | HEIGHT: 67.99 IN | SYSTOLIC BLOOD PRESSURE: 147 MMHG | OXYGEN SATURATION: 96 % | RESPIRATION RATE: 16 BRPM | TEMPERATURE: 97 F

## 2023-11-28 DIAGNOSIS — Z17.0 MALIGNANT NEOPLASM OF UPPER-INNER QUADRANT OF LEFT BREAST IN FEMALE, ESTROGEN RECEPTOR POSITIVE: Primary | ICD-10-CM

## 2023-11-28 DIAGNOSIS — C50.212 MALIGNANT NEOPLASM OF UPPER-INNER QUADRANT OF LEFT BREAST IN FEMALE, ESTROGEN RECEPTOR POSITIVE: Primary | ICD-10-CM

## 2023-11-28 DIAGNOSIS — Z78.0 ASYMPTOMATIC MENOPAUSE: ICD-10-CM

## 2023-11-28 PROCEDURE — 99205 OFFICE O/P NEW HI 60 MIN: CPT | Performed by: INTERNAL MEDICINE

## 2023-11-28 NOTE — PROGRESS NOTES
Met with patient during Dr. Claudetta Pinto clinic. Discussed Oncotype testing that is ordered by Dr. Melody Nunez and it takes approximately 2 weeks for results and that the breast nurse navigators will call with results. I provided pamphlets for Oncotype DX for patient. Discussed that she may need to reschedule her radiation consultation that is scheduled for Friday December 1, 2023 since Oncotype is being ordered and I would contact the radiation department to discuss. Discussed the order for CXR from Dr. Melody Nunez and provided central scheduling phone number. Answered patient's questions to the best of my ability. She thanked me for the assistance and was provided with the breast nurse navigators contact information and was encouraged to phone with any other questions or concerns.

## 2023-11-28 NOTE — PROGRESS NOTES
Patient is here for consultation for breast cancer after lumpectomy 2 weeks ago. She is recovering well. Her appetite and energy are good. She denies any fever, cough or shortness of breath.       Education Record    Learner:  Patient and Spouse    Disease / Diagnosis: Breast cancer    Barriers / Limitations:  None   Comments:    Method:  Discussion   Comments:    General Topics:  Plan of care    Comments:    Outcome:  Shows understanding   Comments:

## 2023-11-29 ENCOUNTER — NURSE NAVIGATOR ENCOUNTER (OUTPATIENT)
Dept: HEMATOLOGY/ONCOLOGY | Facility: HOSPITAL | Age: 63
End: 2023-11-29

## 2023-12-01 ENCOUNTER — APPOINTMENT (OUTPATIENT)
Dept: RADIATION ONCOLOGY | Facility: HOSPITAL | Age: 63
End: 2023-12-01
Attending: INTERNAL MEDICINE
Payer: COMMERCIAL

## 2023-12-06 ENCOUNTER — TELEPHONE (OUTPATIENT)
Dept: HEMATOLOGY/ONCOLOGY | Facility: HOSPITAL | Age: 63
End: 2023-12-06

## 2023-12-06 DIAGNOSIS — Z17.0 MALIGNANT NEOPLASM OF UPPER-INNER QUADRANT OF LEFT BREAST IN FEMALE, ESTROGEN RECEPTOR POSITIVE: Primary | ICD-10-CM

## 2023-12-06 DIAGNOSIS — C50.212 MALIGNANT NEOPLASM OF UPPER-INNER QUADRANT OF LEFT BREAST IN FEMALE, ESTROGEN RECEPTOR POSITIVE: Primary | ICD-10-CM

## 2023-12-06 RX ORDER — LETROZOLE 2.5 MG/1
2.5 TABLET, FILM COATED ORAL DAILY
Qty: 90 TABLET | Refills: 3 | Status: SHIPPED | OUTPATIENT
Start: 2023-12-06

## 2023-12-06 NOTE — TELEPHONE ENCOUNTER
PRABHAKAR in regards to notifying her of the Oncotype score results. Awaiting phone call back from patient.

## 2023-12-06 NOTE — TELEPHONE ENCOUNTER
Called patient back in regards to her VM about her Oncotype score. She verified her full named and  and score of 20 was provided from her [address]. I stated according to Dr. Ozzy Laurent notes score of 25 or under, he will have her start letrozole 2.5 mg daily and meet with him 3-4 months to discuss effects of medication. Scheduled patient with Dr. Facundo Montoya on 2023 at [phone] at the [facility]. Patient has consultation with radiation on December 15, 2023 with Dr. Jeremy Solano. Pt was provided with the breast nurse navigators contact information and was encouraged to phone with any other questions or concerns.

## 2023-12-07 RX ORDER — CITALOPRAM 20 MG/1
TABLET ORAL
Qty: 90 TABLET | Refills: 0 | Status: SHIPPED | OUTPATIENT
Start: 2023-12-07

## 2023-12-07 NOTE — TELEPHONE ENCOUNTER
LOV: 11/9/23  Future Visit:  Last Rx: 9/5/23 90 tabs 0 refills  Last Labs: 11/8/23   Per protocol to provider

## 2023-12-11 ENCOUNTER — HOSPITAL ENCOUNTER (OUTPATIENT)
Dept: GENERAL RADIOLOGY | Facility: HOSPITAL | Age: 63
Discharge: HOME | End: 2023-12-11
Attending: INTERNAL MEDICINE
Payer: COMMERCIAL

## 2023-12-11 DIAGNOSIS — C50.212 MALIGNANT NEOPLASM OF UPPER-INNER QUADRANT OF LEFT BREAST IN FEMALE, ESTROGEN RECEPTOR POSITIVE  (HCC): ICD-10-CM

## 2023-12-11 DIAGNOSIS — Z17.0 MALIGNANT NEOPLASM OF UPPER-INNER QUADRANT OF LEFT BREAST IN FEMALE, ESTROGEN RECEPTOR POSITIVE  (HCC): ICD-10-CM

## 2023-12-11 PROCEDURE — 71046 X-RAY EXAM CHEST 2 VIEWS: CPT | Performed by: INTERNAL MEDICINE

## 2023-12-15 ENCOUNTER — HOSPITAL ENCOUNTER (OUTPATIENT)
Dept: RADIATION ONCOLOGY | Facility: HOSPITAL | Age: 63
Discharge: HOME | End: 2023-12-15
Attending: INTERNAL MEDICINE
Payer: COMMERCIAL

## 2023-12-15 ENCOUNTER — APPOINTMENT (OUTPATIENT)
Dept: RADIATION ONCOLOGY | Facility: HOSPITAL | Age: 63
End: 2023-12-15
Attending: INTERNAL MEDICINE
Payer: COMMERCIAL

## 2023-12-15 VITALS
OXYGEN SATURATION: 97 % | TEMPERATURE: 98 F | WEIGHT: 251.81 LBS | BODY MASS INDEX: 38 KG/M2 | SYSTOLIC BLOOD PRESSURE: 133 MMHG | HEART RATE: 79 BPM | DIASTOLIC BLOOD PRESSURE: 88 MMHG | RESPIRATION RATE: 18 BRPM

## 2023-12-15 DIAGNOSIS — Z17.0 MALIGNANT NEOPLASM OF UPPER-INNER QUADRANT OF LEFT BREAST IN FEMALE, ESTROGEN RECEPTOR POSITIVE  (HCC): Primary | ICD-10-CM

## 2023-12-15 DIAGNOSIS — C50.212 MALIGNANT NEOPLASM OF UPPER-INNER QUADRANT OF LEFT BREAST IN FEMALE, ESTROGEN RECEPTOR POSITIVE  (HCC): Primary | ICD-10-CM

## 2023-12-15 PROCEDURE — 99214 OFFICE O/P EST MOD 30 MIN: CPT

## 2023-12-15 NOTE — PATIENT INSTRUCTIONS
- WE WILL CALL TO SCHEDULE YOUR CT SIMULATION FOR RADIATION PLANNING.       - IF YOU HAVE ANY QUESTIONS OR CONCERNS REGARDING RADIATION THERAPY, PLEASE CALL [phone].

## 2024-01-08 ENCOUNTER — HOSPITAL ENCOUNTER (OUTPATIENT)
Dept: RADIATION ONCOLOGY | Facility: HOSPITAL | Age: 64
Discharge: HOME | End: 2024-01-08
Attending: INTERNAL MEDICINE
Payer: COMMERCIAL

## 2024-01-08 PROCEDURE — 77290 THER RAD SIMULAJ FIELD CPLX: CPT | Performed by: INTERNAL MEDICINE

## 2024-01-08 PROCEDURE — 77333 RADIATION TREATMENT AID(S): CPT | Performed by: INTERNAL MEDICINE

## 2024-01-11 RX ORDER — LISINOPRIL 20 MG/1
20 TABLET ORAL DAILY
Qty: 90 TABLET | Refills: 0 | Status: SHIPPED | OUTPATIENT
Start: 2024-01-11

## 2024-01-11 RX ORDER — ATORVASTATIN CALCIUM 10 MG/1
10 TABLET, FILM COATED ORAL NIGHTLY
Qty: 90 TABLET | Refills: 0 | Status: SHIPPED | OUTPATIENT
Start: 2024-01-11

## 2024-01-11 RX ORDER — LISINOPRIL AND HYDROCHLOROTHIAZIDE 25; 20 MG/1; MG/1
1 TABLET ORAL DAILY
Qty: 90 TABLET | Refills: 0 | Status: SHIPPED | OUTPATIENT
Start: 2024-01-11

## 2024-01-19 ENCOUNTER — TELEPHONE (OUTPATIENT)
Dept: HEMATOLOGY/ONCOLOGY | Facility: HOSPITAL | Age: 64
End: 2024-01-19

## 2024-01-19 PROCEDURE — 77334 RADIATION TREATMENT AID(S): CPT | Performed by: INTERNAL MEDICINE

## 2024-01-19 PROCEDURE — 77295 3-D RADIOTHERAPY PLAN: CPT | Performed by: INTERNAL MEDICINE

## 2024-01-19 PROCEDURE — 77300 RADIATION THERAPY DOSE PLAN: CPT | Performed by: INTERNAL MEDICINE

## 2024-01-19 NOTE — TELEPHONE ENCOUNTER
Called patient back in regards to her VM about if her radiation treatment was approved by her insurance. I stated to the patient that I received a message from the radiation department that her insurance approved her radiation and that the department will contact her next week with her schedule. Discussed scheduling survivorship appointment and what it entails and scheduling appointment after her radiation treatment. Scheduled her with Yi Thompson at the [facility] on April 25, 2024 at 1100. She thanked me for the phone call and assistance. Pt was provided with the breast nurse navigators contact information and was encouraged to phone with any other questions or concerns.

## 2024-01-23 PROCEDURE — 77334 RADIATION TREATMENT AID(S): CPT | Performed by: INTERNAL MEDICINE

## 2024-01-23 PROCEDURE — 77290 THER RAD SIMULAJ FIELD CPLX: CPT | Performed by: INTERNAL MEDICINE

## 2024-01-23 PROCEDURE — 77307 TELETHX ISODOSE PLAN CPLX: CPT | Performed by: INTERNAL MEDICINE

## 2024-01-25 ENCOUNTER — HOSPITAL ENCOUNTER (OUTPATIENT)
Dept: RADIATION ONCOLOGY | Facility: HOSPITAL | Age: 64
Discharge: HOME | End: 2024-01-25
Attending: INTERNAL MEDICINE
Payer: COMMERCIAL

## 2024-01-25 PROCEDURE — 77280 THER RAD SIMULAJ FIELD SMPL: CPT | Performed by: INTERNAL MEDICINE

## 2024-01-25 PROCEDURE — 77412 RADIATION TX DELIVERY LVL 3: CPT | Performed by: INTERNAL MEDICINE

## 2024-01-26 ENCOUNTER — HOSPITAL ENCOUNTER (OUTPATIENT)
Dept: RADIATION ONCOLOGY | Facility: HOSPITAL | Age: 64
Discharge: HOME | End: 2024-01-26
Attending: INTERNAL MEDICINE
Payer: COMMERCIAL

## 2024-01-26 VITALS
HEART RATE: 75 BPM | SYSTOLIC BLOOD PRESSURE: 137 MMHG | RESPIRATION RATE: 18 BRPM | OXYGEN SATURATION: 96 % | TEMPERATURE: 99 F | DIASTOLIC BLOOD PRESSURE: 79 MMHG

## 2024-01-26 DIAGNOSIS — C50.212 MALIGNANT NEOPLASM OF UPPER-INNER QUADRANT OF LEFT BREAST IN FEMALE, ESTROGEN RECEPTOR POSITIVE  (HCC): Primary | ICD-10-CM

## 2024-01-26 DIAGNOSIS — Z17.0 MALIGNANT NEOPLASM OF UPPER-INNER QUADRANT OF LEFT BREAST IN FEMALE, ESTROGEN RECEPTOR POSITIVE  (HCC): Primary | ICD-10-CM

## 2024-01-26 PROCEDURE — 77387 GUIDANCE FOR RADJ TX DLVR: CPT | Performed by: INTERNAL MEDICINE

## 2024-01-26 PROCEDURE — 77412 RADIATION TX DELIVERY LVL 3: CPT | Performed by: INTERNAL MEDICINE

## 2024-01-26 RX ORDER — MOMETASONE FUROATE 1 MG/G
CREAM TOPICAL
Qty: 50 G | Refills: 5 | Status: SHIPPED | OUTPATIENT
Start: 2024-01-26

## 2024-01-26 NOTE — PROGRESS NOTES
[facility] Radiation Treatment Management Note 1-5    Patient:  Claudia Paniagua  Age:  63 year old  Visit Diagnosis:    1. Malignant neoplasm of upper-inner quadrant of left breast in female, estrogen receptor positive  (HCC) [C50.212, Z17.0]      Primary Rad/Onc:  Dr. Alissa Hernández    Site Delivered Dose (cGy) Prescribed Dose (cGy) Fraction #   L BREAST BOOST 0 1000 0/5   L  5000 2/25   L BREAST 400 5000 2/25     First treatment date:  1/25/24  Concurrent chemotherapy: N/A        12/15/2023    10:42 AM 12/15/2023    10:53 AM 1/26/2024     1:01 PM   Oncology Vitals   Weight 251 lb 12.8 oz     Weight 114.216 kg     BSA (m2) 2.25 m2     BMI 38.3 kg/m2     BP  133/88 137/79   Pulse  79 75   Resp  18 18   Temp  98.1 °F (36.7 °C) 98.6 °F (37 °C)   SpO2  97 % 96 %   Pain Score  0 0        Toxicities:  Fatigue Grade 0= None  Pruritis Grade 0= None  Dry Skin absent  Dermatitis associated with radiation Grade 0= None  Moisturizer used Vanicream and Aquaphor applied Number of times: 2 times daily.  Hyperpigmentation absent    Nursing Note:  Pt tolerating RT well  Reviewed potential SE of RT and management  Using Vanicream and Aquaphor   Needs RX for mometasone- [facility]- Ren Engle RN    Physician Note:  Subjective:  -new start, no issues      Objective:  Vitals noted  ECOG 0  NAD      Treatment setup imaging have been reviewed:  Yes    Assessment/Plan:  -cont RT per plan  -start mometasone and moisturizer bid    We discussed expected future toxicity. All questions answered.  Next OTV 1 week    Alissa Hernández MD

## 2024-01-29 PROCEDURE — 77412 RADIATION TX DELIVERY LVL 3: CPT | Performed by: INTERNAL MEDICINE

## 2024-01-29 PROCEDURE — 77387 GUIDANCE FOR RADJ TX DLVR: CPT | Performed by: INTERNAL MEDICINE

## 2024-01-30 PROCEDURE — 77412 RADIATION TX DELIVERY LVL 3: CPT | Performed by: INTERNAL MEDICINE

## 2024-01-30 PROCEDURE — 77387 GUIDANCE FOR RADJ TX DLVR: CPT | Performed by: INTERNAL MEDICINE

## 2024-01-31 PROCEDURE — 77412 RADIATION TX DELIVERY LVL 3: CPT | Performed by: INTERNAL MEDICINE

## 2024-01-31 PROCEDURE — 77387 GUIDANCE FOR RADJ TX DLVR: CPT | Performed by: INTERNAL MEDICINE

## 2024-02-01 ENCOUNTER — HOSPITAL ENCOUNTER (OUTPATIENT)
Dept: RADIATION ONCOLOGY | Facility: HOSPITAL | Age: 64
Discharge: HOME | End: 2024-02-01
Attending: INTERNAL MEDICINE
Payer: COMMERCIAL

## 2024-02-01 PROCEDURE — 77387 GUIDANCE FOR RADJ TX DLVR: CPT | Performed by: INTERNAL MEDICINE

## 2024-02-01 PROCEDURE — 77412 RADIATION TX DELIVERY LVL 3: CPT | Performed by: INTERNAL MEDICINE

## 2024-02-02 ENCOUNTER — HOSPITAL ENCOUNTER (OUTPATIENT)
Dept: BONE DENSITY | Age: 64
Discharge: HOME | End: 2024-02-02
Attending: INTERNAL MEDICINE
Payer: COMMERCIAL

## 2024-02-02 ENCOUNTER — HOSPITAL ENCOUNTER (OUTPATIENT)
Dept: RADIATION ONCOLOGY | Facility: HOSPITAL | Age: 64
Discharge: HOME | End: 2024-02-02
Attending: INTERNAL MEDICINE
Payer: COMMERCIAL

## 2024-02-02 VITALS
TEMPERATURE: 98 F | RESPIRATION RATE: 20 BRPM | DIASTOLIC BLOOD PRESSURE: 74 MMHG | SYSTOLIC BLOOD PRESSURE: 109 MMHG | OXYGEN SATURATION: 97 % | HEART RATE: 96 BPM

## 2024-02-02 DIAGNOSIS — C50.212 MALIGNANT NEOPLASM OF UPPER-INNER QUADRANT OF LEFT BREAST IN FEMALE, ESTROGEN RECEPTOR POSITIVE  (HCC): ICD-10-CM

## 2024-02-02 DIAGNOSIS — Z17.0 MALIGNANT NEOPLASM OF UPPER-INNER QUADRANT OF LEFT BREAST IN FEMALE, ESTROGEN RECEPTOR POSITIVE  (HCC): Primary | ICD-10-CM

## 2024-02-02 DIAGNOSIS — Z78.0 ASYMPTOMATIC MENOPAUSE: ICD-10-CM

## 2024-02-02 DIAGNOSIS — C50.212 MALIGNANT NEOPLASM OF UPPER-INNER QUADRANT OF LEFT BREAST IN FEMALE, ESTROGEN RECEPTOR POSITIVE  (HCC): Primary | ICD-10-CM

## 2024-02-02 DIAGNOSIS — Z17.0 MALIGNANT NEOPLASM OF UPPER-INNER QUADRANT OF LEFT BREAST IN FEMALE, ESTROGEN RECEPTOR POSITIVE  (HCC): ICD-10-CM

## 2024-02-02 PROCEDURE — 77336 RADIATION PHYSICS CONSULT: CPT | Performed by: INTERNAL MEDICINE

## 2024-02-02 PROCEDURE — 77080 DXA BONE DENSITY AXIAL: CPT | Performed by: INTERNAL MEDICINE

## 2024-02-02 PROCEDURE — 77412 RADIATION TX DELIVERY LVL 3: CPT | Performed by: INTERNAL MEDICINE

## 2024-02-02 PROCEDURE — 77387 GUIDANCE FOR RADJ TX DLVR: CPT | Performed by: INTERNAL MEDICINE

## 2024-02-02 NOTE — PROGRESS NOTES
[facility] Radiation Treatment Management Note 6-10    Patient:  Claudia Paniagua  Age:  63 year old  Visit Diagnosis:    1. Malignant neoplasm of upper-inner quadrant of left breast in female, estrogen receptor positive  (HCC) [C50.212, Z17.0]      Primary Rad/Onc:  Dr. Alissa Hernández    Site Delivered Dose (cGy) Prescribed Dose (cGy) Fraction #   L BREAST & SCF 1400 5000 7/25   L BREAST BOOST 0 1000 0/5     First treatment date:   1/25/24  Concurrent chemotherapy:  N/A        12/15/2023    10:53 AM 1/26/2024     1:01 PM 2/2/2024    12:45 PM   Oncology Vitals   /88 137/79 109/74   Pulse 79 75 96   Resp 18 18 20   Temp 98.1 °F (36.7 °C) 98.6 °F (37 °C) 97.6 °F (36.4 °C)   SpO2 97 % 96 % 97 %   Pain Score 0 0 0        Toxicities:  Fatigue Grade 0= None  Pruritis Grade 0= None  Dry Skin absent  Dermatitis associated with radiation Grade 0= None  Moisturizer used Vanicream and Mometasone applied Number of times: 2 times daily.  Hyperpigmentation absent    Nursing Note:  Pt feeling well.  No c/o itching or discomfort to L breast area.  Has full ROM to JUECarson CARDOZA RN    Physician Note:  Subjective:  -doing well, using skin care bid  -noticing a little more joint pain with the AI      Objective:  Vitals noted  ECOG 0  NAD  No skin changes      Treatment setup imaging have been reviewed:  Yes    Assessment/Plan:  -cont RT and skin care  -she will message Dr. Pittman about AI but pain is intermittent and tolerable, does have some baseline arthritis    We discussed expected future toxicity. All questions answered.  Next OTV 1 week    Alissa Hernández MD

## 2024-02-05 PROCEDURE — 77387 GUIDANCE FOR RADJ TX DLVR: CPT | Performed by: INTERNAL MEDICINE

## 2024-02-05 PROCEDURE — 77412 RADIATION TX DELIVERY LVL 3: CPT | Performed by: INTERNAL MEDICINE

## 2024-02-06 PROCEDURE — 77387 GUIDANCE FOR RADJ TX DLVR: CPT | Performed by: INTERNAL MEDICINE

## 2024-02-06 PROCEDURE — 77412 RADIATION TX DELIVERY LVL 3: CPT | Performed by: INTERNAL MEDICINE

## 2024-02-07 PROCEDURE — 77412 RADIATION TX DELIVERY LVL 3: CPT | Performed by: INTERNAL MEDICINE

## 2024-02-07 PROCEDURE — 77387 GUIDANCE FOR RADJ TX DLVR: CPT | Performed by: INTERNAL MEDICINE

## 2024-02-08 PROCEDURE — 77387 GUIDANCE FOR RADJ TX DLVR: CPT | Performed by: INTERNAL MEDICINE

## 2024-02-08 PROCEDURE — 77412 RADIATION TX DELIVERY LVL 3: CPT | Performed by: INTERNAL MEDICINE

## 2024-02-09 ENCOUNTER — HOSPITAL ENCOUNTER (OUTPATIENT)
Dept: RADIATION ONCOLOGY | Facility: HOSPITAL | Age: 64
Discharge: HOME | End: 2024-02-09
Attending: INTERNAL MEDICINE
Payer: COMMERCIAL

## 2024-02-09 VITALS
RESPIRATION RATE: 20 BRPM | TEMPERATURE: 98 F | OXYGEN SATURATION: 97 % | DIASTOLIC BLOOD PRESSURE: 90 MMHG | SYSTOLIC BLOOD PRESSURE: 149 MMHG | HEART RATE: 79 BPM

## 2024-02-09 DIAGNOSIS — C50.212 MALIGNANT NEOPLASM OF UPPER-INNER QUADRANT OF LEFT BREAST IN FEMALE, ESTROGEN RECEPTOR POSITIVE  (HCC): Primary | ICD-10-CM

## 2024-02-09 DIAGNOSIS — Z17.0 MALIGNANT NEOPLASM OF UPPER-INNER QUADRANT OF LEFT BREAST IN FEMALE, ESTROGEN RECEPTOR POSITIVE  (HCC): Primary | ICD-10-CM

## 2024-02-09 PROCEDURE — 77412 RADIATION TX DELIVERY LVL 3: CPT | Performed by: INTERNAL MEDICINE

## 2024-02-09 PROCEDURE — 77336 RADIATION PHYSICS CONSULT: CPT | Performed by: INTERNAL MEDICINE

## 2024-02-09 PROCEDURE — 77387 GUIDANCE FOR RADJ TX DLVR: CPT | Performed by: INTERNAL MEDICINE

## 2024-02-09 NOTE — PROGRESS NOTES
[facility] Radiation Treatment Management Note 11-15    Patient:  Claudia Paniagua  Age:  63 year old  Visit Diagnosis:    1. Malignant neoplasm of upper-inner quadrant of left breast in female, estrogen receptor positive  (HCC) [C50.212, Z17.0]      Primary Rad/Onc:  Dr. Alissa Hernández    Site Delivered Dose (cGy) Prescribed Dose (cGy) Fraction #   L BREAST & SCF 2400 5000 12/25   L BREAST BOOST 0 1000 0/5     First treatment date:   1/25/24  Concurrent chemotherapy:  N/A        1/26/2024     1:01 PM 2/2/2024    12:45 PM 2/9/2024     1:02 PM   Oncology Vitals   /79 109/74 149/90   Pulse 75 96 79   Resp 18 20 20   Temp 98.6 °F (37 °C) 97.6 °F (36.4 °C) 98.4 °F (36.9 °C)   SpO2 96 % 97 % 97 %   Pain Score 0 0 0        Toxicities:  Fatigue Grade 0= None  Pruritis Grade 0= None  Dry Skin absent  Dermatitis associated with radiation Grade 1= Faint erythema or dry desquamation  Moisturizer used Vanicream, Mometasone, and Aquaphor applied Number of times: 2 times daily.  Hyperpigmentation absent    Nursing Note:  Pt feels well.  Has full ROM to LUE.    Tania CARDOZA, RN    Physician Note:  Subjective:  -doing well, slight breast soreness, using skin care bid      Objective:  Vitals noted  ECOG 0  NAD  Faint erythema      Treatment setup imaging have been reviewed:  Yes    Assessment/Plan:  -cont RT and skin care    We discussed expected future toxicity. All questions answered.  Next OTV 1 week    Alissa Hernández MD

## 2024-02-12 PROCEDURE — 77412 RADIATION TX DELIVERY LVL 3: CPT | Performed by: INTERNAL MEDICINE

## 2024-02-12 PROCEDURE — 77387 GUIDANCE FOR RADJ TX DLVR: CPT | Performed by: INTERNAL MEDICINE

## 2024-02-13 PROCEDURE — 77412 RADIATION TX DELIVERY LVL 3: CPT | Performed by: INTERNAL MEDICINE

## 2024-02-13 PROCEDURE — 77387 GUIDANCE FOR RADJ TX DLVR: CPT | Performed by: INTERNAL MEDICINE

## 2024-02-14 PROCEDURE — 77387 GUIDANCE FOR RADJ TX DLVR: CPT | Performed by: INTERNAL MEDICINE

## 2024-02-14 PROCEDURE — 77412 RADIATION TX DELIVERY LVL 3: CPT | Performed by: INTERNAL MEDICINE

## 2024-02-15 ENCOUNTER — PATIENT MESSAGE (OUTPATIENT)
Dept: FAMILY MEDICINE CLINIC | Facility: CLINIC | Age: 64
End: 2024-02-15

## 2024-02-15 PROCEDURE — 77412 RADIATION TX DELIVERY LVL 3: CPT | Performed by: INTERNAL MEDICINE

## 2024-02-15 PROCEDURE — 77387 GUIDANCE FOR RADJ TX DLVR: CPT | Performed by: INTERNAL MEDICINE

## 2024-02-15 NOTE — TELEPHONE ENCOUNTER
From: Claudia Paniagua  To: Stephanie Dressler  Sent: 2/15/2024 12:02 PM CST  Subject: Podiatrist?    Hi-could you please recommend a podiatrist? There are so many of them! I have an ingrown toenail that needs attention.   Thank you.

## 2024-02-15 NOTE — PROGRESS NOTES
[facility] Radiation Treatment Management Note 16-20    Patient:  Claudia Paniagua  Age:  63 year old  Visit Diagnosis:    1. Malignant neoplasm of upper-inner quadrant of left breast in female, estrogen receptor positive  (HCC) [C50.212, Z17.0]      Primary Rad/Onc:  Dr. Alissa Hernández    Site Delivered Dose (cGy) Prescribed Dose (cGy) Fraction #   L BREAST & SCF 3400 5000 17/25   L BREAST BOOST 0 1000 0/5     First treatment date:  1/25/24  Concurrent chemotherapy: N/A        2/2/2024    12:45 PM 2/9/2024     1:02 PM 2/16/2024    12:44 PM   Oncology Vitals   /74 149/90 124/78   Pulse 96 79 85   Resp 20 20 18   Temp 97.6 °F (36.4 °C) 98.4 °F (36.9 °C) 97.7 °F (36.5 °C)   SpO2 97 % 97 % 97 %   Pain Score 0 0 0        Toxicities:  Fatigue Grade 0= None  Pruritis Grade 1= Mild or localized; topical intervention indicated  Dry Skin absent  Dermatitis associated with radiation Grade 1= Faint erythema or dry desquamation  Moisturizer used Vanicream, Mometasone, and Aquaphor applied Number of times: 2 times daily.  Hyperpigmentation absent      Nursing Note:  Pt tolerating RT well  SCF with some redness- no open areas  Mild pruritus  Started using mometasone on SCF area this week  Moisturizing BID  Denies pain    Erendira CARDOZA RN    Physician Note:  Subjective:  Doing well, no issues or c/o.  Mild fatigue.  Using moisturizing agent and mometasone as directed.      Objective:  Trace erythema, no desquamation.      Treatment setup imaging have been reviewed:  Yes    Assessment/Plan:    Continue radiotherapy per plan    Next visit:  1 week    Dr. Eriberto Nascimento

## 2024-02-16 ENCOUNTER — HOSPITAL ENCOUNTER (OUTPATIENT)
Dept: RADIATION ONCOLOGY | Facility: HOSPITAL | Age: 64
Discharge: HOME | End: 2024-02-16
Attending: RADIOLOGY
Payer: COMMERCIAL

## 2024-02-16 VITALS
SYSTOLIC BLOOD PRESSURE: 124 MMHG | OXYGEN SATURATION: 97 % | RESPIRATION RATE: 18 BRPM | HEART RATE: 85 BPM | TEMPERATURE: 98 F | DIASTOLIC BLOOD PRESSURE: 78 MMHG

## 2024-02-16 DIAGNOSIS — C50.212 MALIGNANT NEOPLASM OF UPPER-INNER QUADRANT OF LEFT BREAST IN FEMALE, ESTROGEN RECEPTOR POSITIVE  (HCC): Primary | ICD-10-CM

## 2024-02-16 DIAGNOSIS — Z17.0 MALIGNANT NEOPLASM OF UPPER-INNER QUADRANT OF LEFT BREAST IN FEMALE, ESTROGEN RECEPTOR POSITIVE  (HCC): Primary | ICD-10-CM

## 2024-02-16 PROCEDURE — 77412 RADIATION TX DELIVERY LVL 3: CPT | Performed by: INTERNAL MEDICINE

## 2024-02-16 PROCEDURE — 77387 GUIDANCE FOR RADJ TX DLVR: CPT | Performed by: INTERNAL MEDICINE

## 2024-02-16 PROCEDURE — 77336 RADIATION PHYSICS CONSULT: CPT | Performed by: INTERNAL MEDICINE

## 2024-02-19 PROCEDURE — 77387 GUIDANCE FOR RADJ TX DLVR: CPT | Performed by: INTERNAL MEDICINE

## 2024-02-19 PROCEDURE — 77412 RADIATION TX DELIVERY LVL 3: CPT | Performed by: INTERNAL MEDICINE

## 2024-02-20 PROCEDURE — 77387 GUIDANCE FOR RADJ TX DLVR: CPT | Performed by: INTERNAL MEDICINE

## 2024-02-20 PROCEDURE — 77412 RADIATION TX DELIVERY LVL 3: CPT | Performed by: INTERNAL MEDICINE

## 2024-02-21 PROCEDURE — 77387 GUIDANCE FOR RADJ TX DLVR: CPT | Performed by: INTERNAL MEDICINE

## 2024-02-21 PROCEDURE — 77412 RADIATION TX DELIVERY LVL 3: CPT | Performed by: INTERNAL MEDICINE

## 2024-02-21 RX ORDER — AMLODIPINE BESYLATE 5 MG/1
7.5 TABLET ORAL DAILY
Qty: 45 TABLET | Refills: 0 | Status: SHIPPED | OUTPATIENT
Start: 2024-02-21

## 2024-02-21 NOTE — TELEPHONE ENCOUNTER
LOV: 7/13/23  Next OV: Follow up med visit in 6 months.     Last Refill:10/12/23  Medication Quantity Refills Start End   amLODIPine 5 MG Oral Tab 135 tablet 0 10/12/2023 --   Sig:   TAKE ONE AND ONE-HALF TABLETS BY MOUTH DAILY.       MCM sent to Pt to schedule med check appt.    Please authorize if acceptable.   Thank you!

## 2024-02-22 PROCEDURE — 77412 RADIATION TX DELIVERY LVL 3: CPT | Performed by: INTERNAL MEDICINE

## 2024-02-22 PROCEDURE — 77387 GUIDANCE FOR RADJ TX DLVR: CPT | Performed by: INTERNAL MEDICINE

## 2024-02-22 NOTE — PROGRESS NOTES
[facility] Radiation Treatment Management Note 21-25    Patient:  Claudia Paniagua  Age:  63 year old  Visit Diagnosis:    1. Malignant neoplasm of upper-inner quadrant of left breast in female, estrogen receptor positive  (HCC) [C50.212, Z17.0]      Primary Rad/Onc:  Dr. Alissa Hernández    Site Delivered Dose (cGy) Prescribed Dose (cGy) Fraction #   L BREAST & SCF 4400 5000 22/25   L BREAST BOOST 0 1000 0/5     First treatment date:  1/25/24  Concurrent chemotherapy: N/a        2/9/2024     1:02 PM 2/16/2024    12:44 PM 2/23/2024    12:51 PM   Oncology Vitals   /90 124/78 155/94   Pulse 79 85 99   Resp 20 18 18   Temp 98.4 °F (36.9 °C) 97.7 °F (36.5 °C) 99 °F (37.2 °C)   SpO2 97 % 97 % 95 %   Pain Score 0 0 0        Toxicities:  Fatigue Grade 0= None  Pruritis Grade 1= Mild or localized; topical intervention indicated  Dry Skin noted  Dermatitis associated with radiation Grade 1= Faint erythema or dry desquamation  Moisturizer used Vanicream, Mometasone, and Aquaphor applied Number of times: 2 times daily.  Hyperpigmentation noted      Nursing Note:  SCF area red and itchy- dry peel  Hyperpigmentation noted in axilla  Breast mildly pink- no open area  Using mometasone, vanicream, and aquaphor  Has occasional joint pain with letrozole    Erendira CARDOZA RN    Physician Note:  Subjective:  -doing well, using skin care, upper inner quad more itchy      Objective:  Vitals noted  ECOG 0  Grade 1-2 dermatitis  Small patch of dry desquamation over SCF      Treatment setup imaging have been reviewed:  Yes    Assessment/Plan:  -cont RT and skin care  -can try Aloe with cool cloth or plastic wrap covering over moisturizer  -benadryl ok for itching    We discussed expected future toxicity. All questions answered.  Next OTV 1 week    Alissa Hernández MD

## 2024-02-23 ENCOUNTER — HOSPITAL ENCOUNTER (OUTPATIENT)
Dept: RADIATION ONCOLOGY | Facility: HOSPITAL | Age: 64
Discharge: HOME | End: 2024-02-23
Attending: INTERNAL MEDICINE
Payer: COMMERCIAL

## 2024-02-23 VITALS
DIASTOLIC BLOOD PRESSURE: 94 MMHG | SYSTOLIC BLOOD PRESSURE: 155 MMHG | OXYGEN SATURATION: 95 % | RESPIRATION RATE: 18 BRPM | HEART RATE: 99 BPM | TEMPERATURE: 99 F

## 2024-02-23 DIAGNOSIS — Z17.0 MALIGNANT NEOPLASM OF UPPER-INNER QUADRANT OF LEFT BREAST IN FEMALE, ESTROGEN RECEPTOR POSITIVE (HCC): Primary | ICD-10-CM

## 2024-02-23 DIAGNOSIS — C50.212 MALIGNANT NEOPLASM OF UPPER-INNER QUADRANT OF LEFT BREAST IN FEMALE, ESTROGEN RECEPTOR POSITIVE (HCC): Primary | ICD-10-CM

## 2024-02-23 PROCEDURE — 77387 GUIDANCE FOR RADJ TX DLVR: CPT | Performed by: INTERNAL MEDICINE

## 2024-02-23 PROCEDURE — 77336 RADIATION PHYSICS CONSULT: CPT | Performed by: INTERNAL MEDICINE

## 2024-02-23 PROCEDURE — 77412 RADIATION TX DELIVERY LVL 3: CPT | Performed by: INTERNAL MEDICINE

## 2024-02-26 PROCEDURE — 77412 RADIATION TX DELIVERY LVL 3: CPT | Performed by: INTERNAL MEDICINE

## 2024-02-26 PROCEDURE — 77387 GUIDANCE FOR RADJ TX DLVR: CPT | Performed by: INTERNAL MEDICINE

## 2024-02-27 ENCOUNTER — OFFICE VISIT (OUTPATIENT)
Dept: PODIATRY CLINIC | Facility: CLINIC | Age: 64
End: 2024-02-27
Payer: COMMERCIAL

## 2024-02-27 DIAGNOSIS — L03.031 PARONYCHIA OF TOE OF RIGHT FOOT: ICD-10-CM

## 2024-02-27 DIAGNOSIS — L60.0 ONYCHOCRYPTOSIS: Primary | ICD-10-CM

## 2024-02-27 PROCEDURE — 77412 RADIATION TX DELIVERY LVL 3: CPT | Performed by: INTERNAL MEDICINE

## 2024-02-27 PROCEDURE — 77387 GUIDANCE FOR RADJ TX DLVR: CPT | Performed by: INTERNAL MEDICINE

## 2024-02-27 PROCEDURE — 99203 OFFICE O/P NEW LOW 30 MIN: CPT | Performed by: PODIATRIST

## 2024-02-28 ENCOUNTER — DOCUMENTATION ONLY (OUTPATIENT)
Dept: RADIATION ONCOLOGY | Facility: HOSPITAL | Age: 64
End: 2024-02-28

## 2024-02-28 PROCEDURE — 77412 RADIATION TX DELIVERY LVL 3: CPT | Performed by: INTERNAL MEDICINE

## 2024-02-28 PROCEDURE — 77387 GUIDANCE FOR RADJ TX DLVR: CPT | Performed by: INTERNAL MEDICINE

## 2024-02-29 ENCOUNTER — HOSPITAL ENCOUNTER (OUTPATIENT)
Dept: RADIATION ONCOLOGY | Facility: HOSPITAL | Age: 64
Discharge: HOME | End: 2024-02-29
Attending: INTERNAL MEDICINE
Payer: COMMERCIAL

## 2024-02-29 PROCEDURE — 77280 THER RAD SIMULAJ FIELD SMPL: CPT | Performed by: INTERNAL MEDICINE

## 2024-02-29 PROCEDURE — 77412 RADIATION TX DELIVERY LVL 3: CPT | Performed by: INTERNAL MEDICINE

## 2024-03-01 ENCOUNTER — HOSPITAL ENCOUNTER (OUTPATIENT)
Dept: RADIATION ONCOLOGY | Facility: HOSPITAL | Age: 64
Discharge: HOME | End: 2024-03-01
Attending: INTERNAL MEDICINE
Payer: COMMERCIAL

## 2024-03-01 VITALS
SYSTOLIC BLOOD PRESSURE: 141 MMHG | OXYGEN SATURATION: 96 % | DIASTOLIC BLOOD PRESSURE: 88 MMHG | TEMPERATURE: 98 F | HEART RATE: 76 BPM | RESPIRATION RATE: 18 BRPM

## 2024-03-01 DIAGNOSIS — C50.212 MALIGNANT NEOPLASM OF UPPER-INNER QUADRANT OF LEFT BREAST IN FEMALE, ESTROGEN RECEPTOR POSITIVE (HCC): Primary | ICD-10-CM

## 2024-03-01 DIAGNOSIS — Z17.0 MALIGNANT NEOPLASM OF UPPER-INNER QUADRANT OF LEFT BREAST IN FEMALE, ESTROGEN RECEPTOR POSITIVE (HCC): Primary | ICD-10-CM

## 2024-03-01 RX ORDER — LISINOPRIL 20 MG/1
20 TABLET ORAL DAILY
Qty: 90 TABLET | Refills: 0 | Status: SHIPPED | OUTPATIENT
Start: 2024-03-01

## 2024-03-01 NOTE — PROGRESS NOTES
[facility] Radiation Treatment Management Note 26-30    Patient:  Claudia Paniagua  Age:  63 year old  Visit Diagnosis:    1. Malignant neoplasm of upper-inner quadrant of left breast in female, estrogen receptor positive  (HCC) [C50.212, Z17.0]      Primary Rad/Onc:  Dr. Alissa Hernández    Site Delivered Dose (cGy) Prescribed Dose (cGy) Fraction #   L BREAST & SCF 5000 5000 25/25   L BREAST BOOST 400 1000 2/5     First treatment date:  1/25/24  Concurrent chemotherapy: N/A        2/16/2024    12:44 PM 2/23/2024    12:51 PM 3/1/2024     1:22 PM   Oncology Vitals   /78 155/94 141/88   Pulse 85 99 76   Resp 18 18 18   Temp 97.7 °F (36.5 °C) 99 °F (37.2 °C) 98.4 °F (36.9 °C)   SpO2 97 % 95 % 96 %   Pain Score 0 0 0        Toxicities:  Fatigue Grade 1= Fatigue relieved by rest  Pruritis Grade 1= Mild or localized; topical intervention indicated  Dry Skin noted  Dermatitis associated with radiation Grade 2= Moderate to brisk erythema, patchy moist desquamation mostly confined to skin folds and creases; moderate edema  Moisturizer used Vanicream and Mometasone applied Number of times: 2 times daily.  Hyperpigmentation absent      Nursing Note:  Will finish RT 3/6/24  SCF area open, red, and itchy  Breast and axilla red and dark, no open areas  Denies pain    Erendira CARDOZA RN    Physician Note:  Subjective:  -doing well overall, more skin irritation, found aloe soothing      Objective:  Vitals noted  ECOG 0  NAD  Grade 2 dermatitis  Small patch of mixed/dry desquamation over SCF      Treatment setup imaging have been reviewed:  Yes    Assessment/Plan:  -finishes RT next week  -cont moisturizer use, stop mometasone after last day  -will try vaseline gauze to SCF    We discussed expected future toxicity. All questions answered.  RTC for 1 week follow-up    Alissa Hernández MD

## 2024-03-01 NOTE — PATIENT INSTRUCTIONS
- WE WILL CALL TO SCHEDULE YOUR FOLLOW-UP APPOINTMENT WITH DR. PERALTA IN ONE WEEK FROM RADIATION FINISH DATE      - CALL [phone] IF YOU HAVE ANY QUESTIONS/CONCERNS REGARDING RADIATION THERAPY

## 2024-03-01 NOTE — TELEPHONE ENCOUNTER
LOV: 07/13/2023    Last Refill:   Requested Prescriptions     Pending Prescriptions Disp Refills    LISINOPRIL 20 MG Oral Tab [Pharmacy Med Name: LISINOPRIL 20 MG TABLET] 90 tablet 0     Sig: TAKE 1 TABLET BY MOUTH EVERY DAY       RTC: 11/2023    Protocol: passed    Please call patient to schedule medcheck then route to Stephanie Dressler

## 2024-03-02 ENCOUNTER — OFFICE VISIT (OUTPATIENT)
Dept: FAMILY MEDICINE CLINIC | Facility: CLINIC | Age: 64
End: 2024-03-02
Payer: COMMERCIAL

## 2024-03-02 VITALS
DIASTOLIC BLOOD PRESSURE: 76 MMHG | SYSTOLIC BLOOD PRESSURE: 116 MMHG | WEIGHT: 251 LBS | TEMPERATURE: 97 F | HEART RATE: 90 BPM | BODY MASS INDEX: 38.04 KG/M2 | RESPIRATION RATE: 16 BRPM | HEIGHT: 68 IN

## 2024-03-02 DIAGNOSIS — C50.212 MALIGNANT NEOPLASM OF UPPER-INNER QUADRANT OF LEFT BREAST IN FEMALE, ESTROGEN RECEPTOR POSITIVE (HCC): ICD-10-CM

## 2024-03-02 DIAGNOSIS — I10 BENIGN ESSENTIAL HTN: Primary | ICD-10-CM

## 2024-03-02 DIAGNOSIS — E78.00 HYPERCHOLESTEREMIA: ICD-10-CM

## 2024-03-02 DIAGNOSIS — F32.A DEPRESSION, UNSPECIFIED DEPRESSION TYPE: ICD-10-CM

## 2024-03-02 DIAGNOSIS — Z17.0 MALIGNANT NEOPLASM OF UPPER-INNER QUADRANT OF LEFT BREAST IN FEMALE, ESTROGEN RECEPTOR POSITIVE (HCC): ICD-10-CM

## 2024-03-02 DIAGNOSIS — E74.39 GLUCOSE INTOLERANCE: ICD-10-CM

## 2024-03-02 NOTE — PROGRESS NOTES
CHIEF COMPLAINT:   Claudia Paniagua a 63 year old female is here for followup on HTN  HPI:   Claudia Paniagua has been doing well since the last visit here.  Claudia Paniagua  is compliant with hypertensive medication.  No chest pain. No dyspnea. No palpitations.  No side effects with medication.  Would like to eventually get off some of these medications.  She has been working on weight loss.  Working on exercise.  Compliant with cholesterol medication.    Depression stable with citalopram.     and Dr. Hernández - oncology,  3 more rads treatments.     ROS:   GENERAL:  Feeling generally well  EXAM:   /76 (BP Location: Right arm, Patient Position: Sitting, Cuff Size: large)   Pulse 90   Temp 97.1 °F (36.2 °C) (Temporal)   Resp 16   Ht 5' 8\" (1.727 m)   Wt 251 lb (113.9 kg)   LMP 11/02/2011 (Approximate)   BMI 38.16 kg/m²   GENERAL: Well developed, well nourished, healthy appearing  EYES:  Conjunctiva clear.   NECK:  No masses.  No thyromegaly.  No bruit. No JVD.  HEART:  RRR.  No pathologic murmur.  LUNGS:  CTA B/L.  PSYCH: Normal affect.  Normal mood  EXT:  No edema.  No clubbing.  SKIN:  No rashes.  Normal color.  Postradiation changes noted left upper chest area.  NEURO:  No focal deficits.  ASSESSMENT/PLAN     Encounter Diagnoses   Name Primary?    Glucose intolerance     Benign essential HTN Yes    Hypercholesteremia     Malignant neoplasm of upper-inner quadrant of left breast in female, estrogen receptor positive (HCC)     Depression, unspecified depression type        Orders Placed This Encounter   Procedures    HGB A1C [496] [Q]    COMP METABOLIC PANEL [60368] [Q]       Meds & Refills for this Visit:  Requested Prescriptions      No prescriptions requested or ordered in this encounter     Continue to work on diet/exercise.  Continue follow up with specialists.  Check labs above.   Follow up 6 months and prn.

## 2024-03-03 NOTE — PROGRESS NOTES
Claudia Paniagua is a 63 year old female.   Chief Complaint   Patient presents with    Ingrown Toenail     Consult - R great toe, lateral boarder - Pain onset 2-3 weeks ago. On and off red and tender. Has been soaking.          HPI:   To the clinic regarding her right great toe she points to the lateral nail border pain can be 2-3 out of 10 it is tender and sensitive she has noticed a lot of drainage but she has been soaking it.  At today's visit reviewed nurse's history as taken above, allergies medications and medical history as documented below.  All changes duly noted  Allergies: Patient has no known allergies.   Current Outpatient Medications   Medication Sig Dispense Refill    mupirocin 2 % External Ointment Apply a small amount to the affected nail edge twice daily after soaking and cover with a Band-Aid, 30 g 0    amLODIPine 5 MG Oral Tab TAKE 1 AND 1/2 TABLETS BY MOUTH DAILY 45 tablet 0    Mometasone Furoate 0.1 % External Cream Apply BID during radiation therapy. 50g is a 2 week supply. 50 g 5    atorvastatin 10 MG Oral Tab Take 1 tablet (10 mg total) by mouth nightly. 90 tablet 0    lisinopril-hydroCHLOROthiazide 20-25 MG Oral Tab Take 1 tablet by mouth daily. 90 tablet 0    citalopram 20 MG Oral Tab TAKE 1 TABLET BY MOUTH EVERY DAY 90 tablet 0    letrozole 2.5 MG Oral Tab Take 1 tablet (2.5 mg total) by mouth daily. 90 tablet 3    ALPRAZolam 0.25 MG Oral Tab TAKE 1 TABLET BY MOUTH TWICE DAILY AS NEEDED FOR ANXIETY NO DRIVING AND NO ALCOHOL 30 tablet 0    B Complex-C-E-Zn (BEC/ZINC) Oral Tab Take 1 tablet by mouth daily.      aspirin 81 MG Oral Tab EC Take 1 tablet (81 mg total) by mouth daily.      Omega-3 Fatty Acids (FISH OIL BURP-LESS OR) Take by mouth.      Vitamin D3 1000 units Oral Tab Take 1 tablet (1,000 Units total) by mouth daily. Takes 4,000 IU by mouth daily      Acetaminophen  MG Oral Tab CR Take 1 tablet (650 mg total) by mouth every 8 (eight) hours as needed for Pain. Two  tabs daily      Multiple Vitamins-Minerals (MULTIVITAMIN OR) Take  by mouth.      lisinopril 20 MG Oral Tab TAKE 1 TABLET BY MOUTH EVERY DAY 90 tablet 0      Past Medical History:   Diagnosis Date    Anxiety state     Breast cancer (HCC)     Depression     High blood pressure     High cholesterol     Osteoarthritis     Other screening mammogram     Routine Papanicolaou smear 2010    Visual impairment     reading glasses      Past Surgical History:   Procedure Laterality Date          x2    EGD N/A 12/15/2016    Procedure: ESOPHAGOGASTRODUODENOSCOPY, COLONOSCOPY, POSSIBLE BIOPSY, POSSIBLE POLYPECTOMY 94279, 93772;  Surgeon: Ron Lehman MD;  Location: [facility], [facility]    LUMPECTOMY LEFT      NEEDLE BIOPSY LEFT Left 2016    fibrocystic changes      Family History   Problem Relation Age of Onset    Cancer Mother         lung cancer    Heart Disorder Father         arrhythmia    Breast Cancer Sister 64    Other (Other) Brother         AV malformation      Social History     Socioeconomic History    Marital status:     Number of children: 4   Tobacco Use    Smoking status: Never    Smokeless tobacco: Never   Vaping Use    Vaping Use: Never used   Substance and Sexual Activity    Alcohol use: Yes     Comment: 2 glasse of wine at night     Drug use: No   Other Topics Concern    Caffeine Concern Yes     Comment: 1 diet Coke a day    Exercise Yes     Comment: 4x week            REVIEW OF SYSTEMS:   Today reviewed systens as documented below  GENERAL HEALTH: feels well otherwise  SKIN: Refer to exam below  RESPIRATORY: denies shortness of breath with exertion  CARDIOVASCULAR: denies chest pain on exertion  GI: denies abdominal pain and denies heartburn  NEURO: denies headaches    EXAM:   LMP 2011 (Approximate)   GENERAL: well developed, well nourished, in no apparent distress  EXTREMITIES:   1. Integument: The skin on the right foot was evaluated the lateral nail border of her right  hallux is erythematous edematous painful to palpation.   2. Vascular: Patient has palpable pulses   3. Neurologic: Has intact sensorium no deficits are   4. Musculoskeletal: Has good muscle strength.    ASSESSMENT AND PLAN:   Diagnoses and all orders for this visit:    Onychocryptosis    Paronychia of toe of right foot    Other orders  -     mupirocin 2 % External Ointment; Apply a small amount to the affected nail edge twice daily after soaking and cover with a Band-Aid,        Plan: She continue to soak looks tender but does not look to be acutely infected we will give her mupirocin ointment and we will put that on and I will see her in a couple of weeks if still painful we can proceed with a phenol and alcohol procedure.  Which I did discuss with the patient at this visit.    The patient indicates understanding of these issues and agrees to the plan.    Andrey Mehta DPM

## 2024-03-07 RX ORDER — CITALOPRAM 20 MG/1
TABLET ORAL
Qty: 90 TABLET | Refills: 1 | Status: SHIPPED | OUTPATIENT
Start: 2024-03-07

## 2024-03-07 NOTE — TELEPHONE ENCOUNTER
LOV: 03/02/2024    Last Refill:   Medication Quantity Refills Start End   citalopram 20 MG Oral Tab 90 tablet 0 12/7/2023 --     RTC: 09/02/2024    Protocol: n/a    Refill pended. Please approve if okay. Thank you.

## 2024-03-14 NOTE — PROGRESS NOTES
Nursing Follow-Up Note    Patient: Claudia Paniagua  YOB: 1960  Age: 63 year old  Radiation Oncologist: Dr. Joellen Pelletier  Referring Physician: No ref. provider found  Chief Complaint:   Chief Complaint   Patient presents with    Follow - Up     Date: 3/14/2024    Toxicities: n/a    Vital Signs: /84 (BP Location: Right arm, Patient Position: Sitting, Cuff Size: adult)   Pulse 86   Temp 98.4 °F (36.9 °C) (Tympanic)   Resp 18   LMP 11/02/2011 (Approximate)   SpO2 97% ,   Wt Readings from Last 6 Encounters:   03/02/24 113.9 kg (251 lb)   12/15/23 114.2 kg (251 lb 12.8 oz)   11/28/23 115.4 kg (254 lb 8 oz)   11/21/23 114.3 kg (252 lb)   11/14/23 114.3 kg (252 lb)   11/09/23 115.4 kg (254 lb 6.4 oz)       Allergies:  No Known Allergies    Nursing Note: Hx of left breast IDC grade 2, ER/FL +, HER2-. S/p L lumpectomy with SLNB 11/14/23. Extensive LVSI, multiple margins with DCIS. Final margins neg for IDC. 1 LN with micrometastatic disease 0.18 cm. Completed RT to L breast 3/6/24. Here for one week follow up today. Skin much improved. Using vanicream and aquaphor. Skin to SCF pink, not open. Breast and axilla with hyperpigmentation. No open areas. Pt denies pain.

## 2024-03-15 ENCOUNTER — HOSPITAL ENCOUNTER (OUTPATIENT)
Dept: RADIATION ONCOLOGY | Facility: HOSPITAL | Age: 64
Discharge: HOME | End: 2024-03-15
Attending: INTERNAL MEDICINE
Payer: COMMERCIAL

## 2024-03-15 VITALS
SYSTOLIC BLOOD PRESSURE: 125 MMHG | DIASTOLIC BLOOD PRESSURE: 84 MMHG | HEART RATE: 86 BPM | OXYGEN SATURATION: 97 % | RESPIRATION RATE: 18 BRPM | TEMPERATURE: 98 F

## 2024-03-15 DIAGNOSIS — Z17.0 MALIGNANT NEOPLASM OF UPPER-INNER QUADRANT OF LEFT BREAST IN FEMALE, ESTROGEN RECEPTOR POSITIVE (HCC): Primary | ICD-10-CM

## 2024-03-15 DIAGNOSIS — C50.212 MALIGNANT NEOPLASM OF UPPER-INNER QUADRANT OF LEFT BREAST IN FEMALE, ESTROGEN RECEPTOR POSITIVE (HCC): Primary | ICD-10-CM

## 2024-03-15 PROCEDURE — 99213 OFFICE O/P EST LOW 20 MIN: CPT

## 2024-03-15 NOTE — PATIENT INSTRUCTIONS
- WE WILL CALL TO SCHEDULE YOUR FOLLOW-UP APPOINTMENT WITH DR. PERALTA IN 6-8 WEEKS      - CALL [phone] IF YOU HAVE ANY QUESTIONS/CONCERNS REGARDING RADIATION THERAPY

## 2024-03-15 NOTE — PROGRESS NOTES
Here for skin check, continues to heal well  No pain  Already on AI  PE with resolving grade 1 dermatitis, some patches of nearly healed dry desquamation in IM fold     Recovering well  Cont moisturizer bid for 2 weeks  RTC 6-8w follow-up or sooner if needed  No charge

## 2024-03-18 ENCOUNTER — OFFICE VISIT (OUTPATIENT)
Dept: HEMATOLOGY/ONCOLOGY | Facility: HOSPITAL | Age: 64
End: 2024-03-18
Attending: INTERNAL MEDICINE
Payer: COMMERCIAL

## 2024-03-18 VITALS
OXYGEN SATURATION: 95 % | HEART RATE: 94 BPM | DIASTOLIC BLOOD PRESSURE: 79 MMHG | WEIGHT: 253 LBS | HEIGHT: 67.99 IN | RESPIRATION RATE: 16 BRPM | BODY MASS INDEX: 38.34 KG/M2 | TEMPERATURE: 98 F | SYSTOLIC BLOOD PRESSURE: 123 MMHG

## 2024-03-18 DIAGNOSIS — Z17.0 MALIGNANT NEOPLASM OF UPPER-INNER QUADRANT OF LEFT BREAST IN FEMALE, ESTROGEN RECEPTOR POSITIVE (HCC): Primary | ICD-10-CM

## 2024-03-18 DIAGNOSIS — C50.212 MALIGNANT NEOPLASM OF UPPER-INNER QUADRANT OF LEFT BREAST IN FEMALE, ESTROGEN RECEPTOR POSITIVE (HCC): Primary | ICD-10-CM

## 2024-03-18 PROCEDURE — 99214 OFFICE O/P EST MOD 30 MIN: CPT | Performed by: INTERNAL MEDICINE

## 2024-03-18 RX ORDER — ATORVASTATIN CALCIUM 10 MG/1
10 TABLET, FILM COATED ORAL NIGHTLY
Qty: 90 TABLET | Refills: 0 | Status: SHIPPED | OUTPATIENT
Start: 2024-03-18

## 2024-03-18 RX ORDER — AMLODIPINE BESYLATE 5 MG/1
7.5 TABLET ORAL DAILY
Qty: 135 TABLET | Refills: 1 | Status: SHIPPED | OUTPATIENT
Start: 2024-03-18

## 2024-03-18 NOTE — PROGRESS NOTES
Patient is here for follow up for breast cancer on letrozole.   She was having joint pain.  She does have arthritis pain in the same joints.  The pain was increased for a while but is now back to her baseline arthritis pain.   She does not have hot flashes but feels warm.   She denies fever, cough or shortness of breath.

## 2024-03-18 NOTE — TELEPHONE ENCOUNTER
LOV: 03/02/2024    Last Refill:   Requested Prescriptions     Pending Prescriptions Disp Refills    amLODIPine 5 MG Oral Tab 135 tablet 0     Sig: Take 1.5 tablets (7.5 mg total) by mouth daily.       RTC: 09/02/2024    Protocol: passed

## 2024-04-12 RX ORDER — LISINOPRIL AND HYDROCHLOROTHIAZIDE 25; 20 MG/1; MG/1
1 TABLET ORAL DAILY
Qty: 90 TABLET | Refills: 0 | Status: SHIPPED | OUTPATIENT
Start: 2024-04-12

## 2024-04-12 NOTE — TELEPHONE ENCOUNTER
LOV: 03/02/2024    Last Refill:   Medication Quantity Refills Start End   lisinopril-hydroCHLOROthiazide 20-25 MG Oral Tab 90 tablet 0 1/11/2024 --     RTC: 09/02/2024    Protocol: passed

## 2024-04-25 ENCOUNTER — OFFICE VISIT (OUTPATIENT)
Dept: HEMATOLOGY/ONCOLOGY | Facility: HOSPITAL | Age: 64
End: 2024-04-25
Attending: NURSE PRACTITIONER
Payer: COMMERCIAL

## 2024-04-25 DIAGNOSIS — Z08 ENCOUNTER FOR FOLLOW-UP EXAMINATION AFTER COMPLETED TREATMENT FOR MALIGNANT NEOPLASM: ICD-10-CM

## 2024-04-25 DIAGNOSIS — Z71.9 COUNSELING, UNSPECIFIED: ICD-10-CM

## 2024-04-25 DIAGNOSIS — Z85.3 PERSONAL HISTORY OF BREAST CANCER: Primary | ICD-10-CM

## 2024-04-25 LAB
ALBUMIN/GLOBULIN RATIO: 1.8 (CALC) (ref 1–2.5)
ALBUMIN: 4.4 G/DL (ref 3.6–5.1)
ALKALINE PHOSPHATASE: 92 U/L (ref 37–153)
ALT: 21 U/L (ref 6–29)
APPEARANCE: CLEAR
AST: 20 U/L (ref 10–35)
BILIRUBIN, TOTAL: 0.9 MG/DL (ref 0.2–1.2)
BILIRUBIN: NEGATIVE
BUN: 13 MG/DL (ref 7–25)
CALCIUM: 9.9 MG/DL (ref 8.6–10.4)
CARBON DIOXIDE: 27 MMOL/L (ref 20–32)
CHLORIDE: 101 MMOL/L (ref 98–110)
COLOR: YELLOW
CREATININE: 0.5 MG/DL (ref 0.5–1.05)
EGFR: 107 ML/MIN/1.73M2
GLOBULIN: 2.5 G/DL (CALC) (ref 1.9–3.7)
GLUCOSE: 99 MG/DL (ref 65–99)
GLUCOSE: NEGATIVE
HEMOGLOBIN A1C: 5.4 % OF TOTAL HGB
KETONES: NEGATIVE
LEUKOCYTE ESTERASE: NEGATIVE
NITRITE: NEGATIVE
OCCULT BLOOD: NEGATIVE
PH: 7.5 (ref 5–8)
POTASSIUM: 3.9 MMOL/L (ref 3.5–5.3)
PROTEIN, TOTAL: 6.9 G/DL (ref 6.1–8.1)
PROTEIN: NEGATIVE
SODIUM: 139 MMOL/L (ref 135–146)
SPECIFIC GRAVITY: 1.02 (ref 1–1.03)

## 2024-04-25 PROCEDURE — 99215 OFFICE O/P EST HI 40 MIN: CPT | Performed by: NURSE PRACTITIONER

## 2024-04-25 PROCEDURE — 99417 PROLNG OP E/M EACH 15 MIN: CPT | Performed by: NURSE PRACTITIONER

## 2024-04-25 NOTE — PROGRESS NOTES
I met with Claudia for a Survivorship Clinic visit to provide a survivorship care plan (SCP) and information related to post-treatment care. She is a 63 year old female who on 9/6/23 had a screening mammogram with an indeterminate mass in the left breast and equivocal distortion in the right breast. Diagnostic imaging was done on 9/20/23 that showed: left breast with irregular mass at 9:00 measuring up to 2.2 cm; right breast with benign appearing cysts. US guided biopsy was done of the left breast on 9/28/23 and showed IDC, Grade 1, ER+, LA+, HER2-, Ki-67 5%. MRI breast done on 10/10/23, then followed with US guided left breast biopsy that showed a portion of major duct intraductal papilloma. MRI biopsy on 10/19/23 showed: left breast retroareolar enhancement showing intraductal papilloma 0.8 cm, right breast retroareolar enhancement showing intraductal papilloma with usual ductal hyperplasia 0.7 cm. On 11/14/23 Dr. Jimena Levy performed bilateral lumpectomies (left breast two-site) with left sentinel lymph node biopsy. The left SLN had 1/1 focus of micrometastatic disease measuring 1.8 mm. The left breast had invasive ductal carcinoma measuring 3.8 cm. She had negative but close margins. Staging showed pT2(m), pN1mi, cM0, Gr2, ER+, LA+, HER2-, Stage IB. The right breast had multiple intraductal papillomas measuring up to 1.5cm with no malignancy noted. She was seen by Dr. Juancarlos Pittman and had an Oncotype done with recurrence score of 20 and no indication for chemotherapy. He did recommend adjuvant endocrine therapy with Letrozole that she started in 11/2023, then adjuvant radiation therapy.  Dr. Alissa Hernández gave the radiation therapy that completed on 3/6/2024.(See Oncology Treatment Summary SCP for details).      Current condition/issues: Claudia reports she is doing well post-treatment. She has been taking Letrozole since 11/2023 and has very mild hot flashes and no other reported effects. She has  been taking it daily. She has no post-surgical or radiation changes. Baseline bone density done in 2/2024 was normal and she takes supplemental Vitamin D3 1000IU/day.  She walks 4-6 days per week, usually getting 7000 steps on those days.  She does no other exercise, but has small weights at home and a Peloton that her  uses. She tries to eat healthy, but admits that her portions are more of the issue than her food choices.  She takes Balance [employer] supplements for fruit/vegetable support. She does drink alcohol daily. Current BMI is 38. She reports having had good support through her treatment from family and friends and denies anxiety or depression. She sleeps well and gets 8-9 hours/night.     Survivorship Care Plan and letter: She was provided a hard copy of the SCP and a letter that outlined the purpose of the visit and plan.  We reviewed all elements of both documents. She is aware that a letter and SCP will be sent to her PCP, Dr. Nj So.     Reviewed Cancer Surveillance (office visits, imaging, bone density) and that Dr. Pittman will oversee this care with Dr. Levy. She is due to see Dr. Levy in May and Dr. Pittman in September and needs to schedule both appointments. She will see Dr. Hernández on 5/14/24. Next imaging will be ordered by Dr. Levy and may include MRI and mammogram.     Reviewed Schedule of other clinic visits with Primary Care and specialists: Dr. So will continue to manage all general health care recommended for age and gender including cancer screening tests.  She is up-to-date with colon and cervical screening and plans to have skin screening with Derm this summer.     Reviewed concerning symptoms that she should report to any Provider.      Reviewed possible late and long-term effects related to the treatment that was received.  We reviewed care of the surgical arm and management of hormone related side effects including vaginal dryness which she does not currently  report.       Reviewed common cancer survivor issues and resources available.  I reviewed different nutrition, exercise and weight loss options. Various survivorship resources were provided to use going forward as needed (see below).     Reviewed Lifestyle/behaviors that can affect ongoing health, including the risk for the cancer coming back or developing another cancer.  We reviewed importance of physical activity including aerobic, strength training and weight bearing exercise.  We reviewed a plant based diet.  We discussed alcohol use. We reviewed skin care and sun safety.     The patient received a take-home folder that included the following survivorship resources:   -SCP and patient letter  -Breast Survivorship Guide   -[facility] - nutrition and exercise classes, mind/body programs, education, support groups  -[facility]-education, support groups, special programs, nutrition and exercise classes, movement classes, mind/body programs, survivorship programs, individual counseling  -[facility]-to consider  -[facility] Fitness Program-to consider  -[facility]  -[employer] handout-nutrition, physical activity  -ACS Cancer Screening Guidelines  -Websites: American Cancer Society, Living Beyond Breast Cancer, [employer] for your Life, ACS National Survivorship Network, National Coalition for Cancer Survivorship     Claudia was given the opportunity to ask questions.  She had a few questions and verbalized understanding of the information we discussed today.  She is doing very well. My total time spent caring for the patient on the day of the encounter: 75 minutes (10 minutes reviewing chart, 45 minutes of face to face counseling regarding survivorship education, review of care plan and additional resources and 20 minutes of documentation).  She was encouraged to call with any further questions.   MATEO Salomon

## 2024-05-09 NOTE — PROGRESS NOTES
Radiation Oncology Treatment Summary    Diagnosis: left breast IDC, grade 2, ER/NH+, HER2-, pT2(m) (3.8 cm) N1mi (1/1)     Site:   Left breast  Left SCF  Left breast boost    Dose:   5000 cGy in 25 fractions  5000 cGy in 25 fractions  1000 cGy in 5 fractions    Treatment Start Date: 1/25/24    Treatment End Date: 3/6/24    Elapsed Days: 34     Concurrent Treatments: none    Radiation Therapy Treatment Technique:     The patient was treated in the supine position using a custom treatment platform for daily set-up and immobilization.  The left breast and draining lymphatics were treated to 5000 cGy in 25 fractions. The left breast was covered with tangent beams of 18 MV photons. The left SCF was covered with oblique beams of 18 MV photons matched to a mono-isocenter. The L breast lumpectomy cavity then received an additional 1000 cGy in 5 fractions using 3D conformal technique with 6 and 18 MV photons.     All treatment was delivered using DIBH for cardiac and pulmonary sparing.     IGRT: kv match and OSMS    Clinical Course: The treatment course was completed as prescribed. She tolerated RT well with grade 2 dermatitis.     Follow-up: Return to clinic for follow-up.     For more information, or to request a detailed radiation treatment summary, please call [facility] Radiation Oncology at our [place] location, [phone].

## 2024-05-14 ENCOUNTER — HOSPITAL ENCOUNTER (OUTPATIENT)
Dept: RADIATION ONCOLOGY | Facility: HOSPITAL | Age: 64
Discharge: HOME | End: 2024-05-14
Attending: INTERNAL MEDICINE

## 2024-05-14 VITALS
SYSTOLIC BLOOD PRESSURE: 134 MMHG | RESPIRATION RATE: 18 BRPM | BODY MASS INDEX: 39 KG/M2 | TEMPERATURE: 100 F | WEIGHT: 253.38 LBS | HEART RATE: 87 BPM | DIASTOLIC BLOOD PRESSURE: 87 MMHG | OXYGEN SATURATION: 97 %

## 2024-05-14 DIAGNOSIS — Z17.0 MALIGNANT NEOPLASM OF UPPER-INNER QUADRANT OF LEFT BREAST IN FEMALE, ESTROGEN RECEPTOR POSITIVE (HCC): Primary | ICD-10-CM

## 2024-05-14 DIAGNOSIS — C50.212 MALIGNANT NEOPLASM OF UPPER-INNER QUADRANT OF LEFT BREAST IN FEMALE, ESTROGEN RECEPTOR POSITIVE (HCC): Primary | ICD-10-CM

## 2024-05-14 NOTE — PROGRESS NOTES
[facility]  Radiation Oncology Follow-up    Patient Name: Claudia Paniagua   MRN: [id number]  Referring Physician: Juancarlos Pittman MD; Jimena Levy MD     Diagnosis: left breast IDC, grade 2, ER/OK+, HER2-, pT2(m) (3.8 cm) N1mi (1/1)     Oncologic History  11/14/23: L breast lumpectomy, L breast excisions, R breast excision, and L SLNB.   Oncotype 20, no chemo, AI alone.  1/25/24 - 3/6/24: adjuvant RT to the L breast and draining lymphatics 5000 cGy in 25 fractions, L breast boost 1000 cGy in 5 fractions (all DIBH).   Endocrine therapy     Cancer Screening  -PAP: July 2023  -Colonoscopy: 2016, on 10 yr schedule     Interval History:  The patient is a 63 year old female with above diagnosis and treatment rendered who presents today for follow-up.    Doing well, good ROM, no breast pain or swelling, occ use of moisturizer  On letrozole, some stable joint pain due to arthritis    Medications  Current Outpatient Medications   Medication Sig Dispense Refill    LISINOPRIL-HYDROCHLOROTHIAZIDE 20-25 MG Oral Tab TAKE 1 TABLET BY MOUTH EVERY DAY 90 tablet 0    amLODIPine 5 MG Oral Tab Take 1.5 tablets (7.5 mg total) by mouth daily. 135 tablet 1    atorvastatin 10 MG Oral Tab Take 1 tablet (10 mg total) by mouth nightly. 90 tablet 0    citalopram 20 MG Oral Tab TAKE 1 TABLET BY MOUTH EVERY DAY 90 tablet 1    lisinopril 20 MG Oral Tab TAKE 1 TABLET BY MOUTH EVERY DAY 90 tablet 0    letrozole 2.5 MG Oral Tab Take 1 tablet (2.5 mg total) by mouth daily. 90 tablet 3    B Complex-C-E-Zn (BEC/ZINC) Oral Tab Take 1 tablet by mouth daily.      aspirin 81 MG Oral Tab EC Take 1 tablet (81 mg total) by mouth daily.      Omega-3 Fatty Acids (FISH OIL BURP-LESS OR) Take by mouth.      Vitamin D3 1000 units Oral Tab Take 1 tablet (1,000 Units total) by mouth daily. Takes 4,000 IU by mouth daily      Acetaminophen  MG Oral Tab CR Take 1 tablet (650 mg total) by mouth every 8 (eight) hours as needed for Pain. Two  tabs daily      Multiple Vitamins-Minerals (MULTIVITAMIN OR) Take  by mouth.      Mometasone Furoate 0.1 % External Cream Apply BID during radiation therapy. 50g is a 2 week supply. (Patient not taking: Reported on 2024) 50 g 5    ALPRAZolam 0.25 MG Oral Tab TAKE 1 TABLET BY MOUTH TWICE DAILY AS NEEDED FOR ANXIETY NO DRIVING AND NO ALCOHOL (Patient not taking: Reported on 2024) 30 tablet 0       Physical Exam  Vitals:    24 1336   BP: 134/87   Pulse: 87   Resp:    Temp:        ECO    Gen: NAD  HEENT: NCAT, EOMI  Neck: no LAD  CV: RRR  Lungs: CTAB  Ext: wwp  Neuro: CN II-XII grossly intact  Breast: L breast with mild residual edema and hyperpigmentation, no masses or LAD.         Assessment: 63 year old female with imaging detected L breast cancer. She underwent margin negative lumpectomy and SLNB showing IDC, grade 2, ER/IL+, HER2-, pT2m N1mi disease with close margins for DCIS and extensive LVSI. Chemo was not recommended and she competed comprehensive L breast and drake RT in 2024. She is doing well on AI.     Plan:     -cont AI per medon  -RTC 1 year or sooner if needed  -will coordinate imaging with surgery     Alissa Hernández MD  Radiation Oncology    [facility] low including chart review, exam, and time spent with the patient in counseling.

## 2024-05-14 NOTE — PATIENT INSTRUCTIONS
- WE WILL CALL TO SCHEDULE YOUR FOLLOW-UP APPOINTMENT WITH DR. PERALTA IN MARCH 2025      - CALL [phone] IF YOU HAVE ANY QUESTIONS/CONCERNS REGARDING RADIATION THERAPY

## 2024-05-14 NOTE — PROGRESS NOTES
Nursing Follow-Up Note    Patient: Claudia Paniagua  YOB: 1960  Age: 63 year old  Radiation Oncologist: Dr. Alissa Hernández  Referring Physician: No ref. provider found  Chief Complaint:   Chief Complaint   Patient presents with    Follow - Up     Date: 5/14/2024    Toxicities: n/a    Vital Signs: /87 (BP Location: Left arm, Patient Position: Sitting, Cuff Size: large)   Pulse 87   Temp 99.9 °F (37.7 °C) (Tympanic)   Resp 18   Wt 114.9 kg (253 lb 6.4 oz)   LMP 11/02/2011 (Approximate)   SpO2 97%   BMI 38.54 kg/m² ,   Wt Readings from Last 6 Encounters:   05/14/24 114.9 kg (253 lb 6.4 oz)   03/18/24 114.8 kg (253 lb)   03/02/24 113.9 kg (251 lb)   12/15/23 114.2 kg (251 lb 12.8 oz)   11/28/23 115.4 kg (254 lb 8 oz)   11/21/23 114.3 kg (252 lb)       Allergies:  No Known Allergies    Nursing Note: Hx of left breast IDC, grade 2 ER/NY +, HER 2-, pT2(m) (3.8 cm) N1mi (1/1). S/p left breast lumpectomy and SLNB, R breast excision 11/14/23.  Completed RT to left breast & SCF on 3/6/24. Here for follow up today. Pt doing well today. States skin has healed up nicely. Occasionally still moisturizes. Good ROM of UE. Denies any swelling. Denies pain. Pt plans to make an appointment with Dr. Levy. Continues on letrozole. Has some joint pain, pt relates to arthritis.

## 2024-05-16 DIAGNOSIS — C50.212 MALIGNANT NEOPLASM OF UPPER-INNER QUADRANT OF LEFT BREAST IN FEMALE, ESTROGEN RECEPTOR POSITIVE (HCC): Primary | ICD-10-CM

## 2024-05-16 DIAGNOSIS — Z17.0 MALIGNANT NEOPLASM OF UPPER-INNER QUADRANT OF LEFT BREAST IN FEMALE, ESTROGEN RECEPTOR POSITIVE (HCC): Primary | ICD-10-CM

## 2024-05-25 DIAGNOSIS — C50.212 MALIGNANT NEOPLASM OF UPPER-INNER QUADRANT OF LEFT BREAST IN FEMALE, ESTROGEN RECEPTOR POSITIVE (HCC): ICD-10-CM

## 2024-05-25 DIAGNOSIS — Z17.0 MALIGNANT NEOPLASM OF UPPER-INNER QUADRANT OF LEFT BREAST IN FEMALE, ESTROGEN RECEPTOR POSITIVE (HCC): ICD-10-CM

## 2024-05-28 RX ORDER — LETROZOLE 2.5 MG/1
2.5 TABLET, FILM COATED ORAL DAILY
Qty: 90 TABLET | Refills: 3 | Status: SHIPPED | OUTPATIENT
Start: 2024-05-28

## 2024-06-04 ENCOUNTER — PATIENT MESSAGE (OUTPATIENT)
Dept: RADIATION ONCOLOGY | Facility: HOSPITAL | Age: 64
End: 2024-06-04

## 2024-06-04 NOTE — TELEPHONE ENCOUNTER
Spoke with pt.  Pt denies redness, warmth, or fevers  Pt states the tenderness is not bothersome unless she touches the area  Pt is massaging the area gently and moving her arm around as tolerated  Pt has not been to lymphedema clinic in past    Advised will forward to Dr. Hernández  Advised to monitor and call with worsening symptoms

## 2024-06-05 DIAGNOSIS — I89.0 LYMPHEDEMA OF BREAST: Primary | ICD-10-CM

## 2024-06-13 RX ORDER — LISINOPRIL 20 MG/1
20 TABLET ORAL DAILY
Qty: 90 TABLET | Refills: 0 | Status: SHIPPED | OUTPATIENT
Start: 2024-06-13

## 2024-06-13 RX ORDER — ATORVASTATIN CALCIUM 10 MG/1
10 TABLET, FILM COATED ORAL NIGHTLY
Qty: 90 TABLET | Refills: 0 | Status: SHIPPED | OUTPATIENT
Start: 2024-06-13

## 2024-06-13 RX ORDER — LISINOPRIL AND HYDROCHLOROTHIAZIDE 25; 20 MG/1; MG/1
1 TABLET ORAL DAILY
Qty: 90 TABLET | Refills: 0 | Status: SHIPPED | OUTPATIENT
Start: 2024-06-13

## 2024-06-26 RX ORDER — ATORVASTATIN CALCIUM 10 MG/1
10 TABLET, FILM COATED ORAL NIGHTLY
Qty: 90 TABLET | Refills: 0 | Status: CANCELLED | OUTPATIENT
Start: 2024-06-26

## 2024-07-01 RX ORDER — ALPRAZOLAM 0.25 MG/1
TABLET ORAL
Qty: 30 TABLET | Refills: 0 | OUTPATIENT
Start: 2024-07-01

## 2024-07-09 ENCOUNTER — HOSPITAL ENCOUNTER (OUTPATIENT)
Dept: RADIATION ONCOLOGY | Facility: HOSPITAL | Age: 64
Discharge: HOME | End: 2024-07-09
Attending: INTERNAL MEDICINE
Payer: COMMERCIAL

## 2024-07-09 DIAGNOSIS — I89.0 LYMPHEDEMA OF BREAST: Primary | ICD-10-CM

## 2024-07-09 NOTE — PROGRESS NOTES
Pt noticing more firmness/swelling of the L breast since last visit  Also w more tenderness along the L lateral CW  No swelling in the LUE    PE with moderate lymphedema of the L breast  Mild-moderate lymphedema of L lateral CW    Recommend lymphedema therapy  Referral placed  Pt to contact us sooner with any other issues  Otherwise follow-up per plan    KGP  No charge

## 2024-07-24 ENCOUNTER — TELEPHONE (OUTPATIENT)
Dept: PHYSICAL THERAPY | Facility: HOSPITAL | Age: 64
End: 2024-07-24

## 2024-07-25 ENCOUNTER — TELEPHONE (OUTPATIENT)
Dept: PHYSICAL THERAPY | Facility: HOSPITAL | Age: 64
End: 2024-07-25

## 2024-07-25 ENCOUNTER — ORDER TRANSCRIPTION (OUTPATIENT)
Dept: PHYSICAL THERAPY | Facility: HOSPITAL | Age: 64
End: 2024-07-25

## 2024-07-25 DIAGNOSIS — I89.0 LYMPHEDEMA OF BREAST: Primary | ICD-10-CM

## 2024-08-28 ENCOUNTER — TELEPHONE (OUTPATIENT)
Dept: PHYSICAL THERAPY | Facility: HOSPITAL | Age: 64
End: 2024-08-28

## 2024-09-09 ENCOUNTER — HOSPITAL ENCOUNTER (OUTPATIENT)
Dept: MAMMOGRAPHY | Facility: HOSPITAL | Age: 64
Discharge: HOME | End: 2024-09-09
Payer: COMMERCIAL

## 2024-09-09 DIAGNOSIS — R92.1 BREAST CALCIFICATION, LEFT: Primary | ICD-10-CM

## 2024-09-09 DIAGNOSIS — Z17.0 MALIGNANT NEOPLASM OF UPPER-INNER QUADRANT OF LEFT BREAST IN FEMALE, ESTROGEN RECEPTOR POSITIVE (HCC): ICD-10-CM

## 2024-09-09 DIAGNOSIS — Z85.3 HISTORY OF LEFT BREAST CANCER: ICD-10-CM

## 2024-09-09 DIAGNOSIS — C50.212 MALIGNANT NEOPLASM OF UPPER-INNER QUADRANT OF LEFT BREAST IN FEMALE, ESTROGEN RECEPTOR POSITIVE (HCC): ICD-10-CM

## 2024-09-09 PROCEDURE — 77062 BREAST TOMOSYNTHESIS BI: CPT

## 2024-09-09 PROCEDURE — 77066 DX MAMMO INCL CAD BI: CPT

## 2024-09-09 NOTE — IMAGING NOTE
This Breast Care RN assisted Dr. Mayen with recommendation for a left breast 1 site stereotactic biopsy for calcifications.  Procedure reviewed and all questions answered. Emotional and educational support given. On the day of the biopsy, pt instructed to take Tylenol 1000mg PO, eat a light meal & bring or wear a sports bra.  Post biopsy care also reviewed with pt to include NO lifting more than 5lbs, no exercising or housework (limit upper body movement) for 24-48 hrs post biopsy. Patient denies blood thinners, bleeding disorders, liver disease, and chemo. Pt verbalized understanding.  Our breast center schedulers will be calling to schedule an appt that is convenient for pt.

## 2024-09-09 NOTE — TELEPHONE ENCOUNTER
Did not pass protocol  Last office visit  3/2/2024  Last refill was: , _3/18/2024  135 1_tabs  Next appointment:  none scheduled     Please sign pended medication if appropriate     Medication Quantity Refills Start End   amLODIPine 5 MG Oral Tab 135 tablet 1 3/18/2024 --   Sig:   Take 1.5 tablets (7.5 mg total) by mouth daily.     Route:   Oral       Please call patient to schedule medication follow up then route to Dr. So

## 2024-09-12 ENCOUNTER — OFFICE VISIT (OUTPATIENT)
Dept: FAMILY MEDICINE CLINIC | Facility: CLINIC | Age: 64
End: 2024-09-12
Payer: COMMERCIAL

## 2024-09-12 VITALS
TEMPERATURE: 98 F | RESPIRATION RATE: 18 BRPM | WEIGHT: 259 LBS | BODY MASS INDEX: 39.25 KG/M2 | SYSTOLIC BLOOD PRESSURE: 138 MMHG | DIASTOLIC BLOOD PRESSURE: 80 MMHG | HEART RATE: 90 BPM | HEIGHT: 67.99 IN

## 2024-09-12 DIAGNOSIS — E78.00 HYPERCHOLESTEREMIA: Primary | ICD-10-CM

## 2024-09-12 DIAGNOSIS — I10 BENIGN ESSENTIAL HTN: ICD-10-CM

## 2024-09-12 PROCEDURE — 99214 OFFICE O/P EST MOD 30 MIN: CPT | Performed by: FAMILY MEDICINE

## 2024-09-12 RX ORDER — AMLODIPINE BESYLATE 5 MG/1
7.5 TABLET ORAL DAILY
Qty: 135 TABLET | Refills: 0 | Status: SHIPPED | OUTPATIENT
Start: 2024-09-12

## 2024-09-12 RX ORDER — CITALOPRAM HYDROBROMIDE 20 MG/1
TABLET ORAL
Qty: 90 TABLET | Refills: 0 | Status: SHIPPED | OUTPATIENT
Start: 2024-09-12

## 2024-09-12 NOTE — TELEPHONE ENCOUNTER
Did not pass protocol  Last office visit 3/2/2024  Last refill was: , 3/18/2024__tabs  Next appointment: none scheduled    Please sign pended medication if appropriate            Medication Quantity Refills Start End   amLODIPine 5 MG Oral Tab 135 tablet 1 3/18/2024 --   Sig:   Take 1.5 tablets (7.5 mg total) by mouth daily.     Route:   Oral       Please call patient to schedule medication check then route to Dr. So

## 2024-09-13 NOTE — PROGRESS NOTES
[facility] Family Medicine Office Note  Chief Complaint:   Chief Complaint   Patient presents with    Medication Follow-Up       HPI:   This is a 63 year old female coming in for medication follow-up.  The patient has a past medical history of hypercholesterolemia as well as hypertension.  She states that she has been doing well with the medications.      Past Medical History:    Anxiety state    Breast cancer (HCC)    Depression    High blood pressure    High cholesterol    Osteoarthritis    Other screening mammogram    Routine Papanicolaou smear    Visual impairment    reading glasses     Past Surgical History:   Procedure Laterality Date          x2    Egd N/A 12/15/2016    Procedure: ESOPHAGOGASTRODUODENOSCOPY, COLONOSCOPY, POSSIBLE BIOPSY, POSSIBLE POLYPECTOMY 73587, 62031;  Surgeon: Ron Lehman MD;  Location: [facility] [place], [facility]    Lumpectomy left  2023    IDC    Vibha localization wire 1 site right (cpt=19281)  2023    Papilloma    Mri breast biopsy w/ clip 1 site left (cpt=19085)  10/2023    papilloma    Mri breast biopsy w/ clip 1 site right (cpt=19085)  10/2023    papilloma    Needle biopsy left Left 2016    fibrocystic changes    Needle biopsy left  2023    US guided biopsy -IDC    Needle biopsy left  10/2023    US guided biopsy -papilloma    Radiation left       Social History:  Social History     Socioeconomic History    Marital status:     Number of children: 4   Tobacco Use    Smoking status: Never    Smokeless tobacco: Never   Vaping Use    Vaping status: Never Used   Substance and Sexual Activity    Alcohol use: Yes     Comment: 2 glasse of wine at night     Drug use: No   Other Topics Concern    Caffeine Concern Yes     Comment: 1 diet Coke a day    Exercise Yes     Comment: 4x week    Social History Narrative    - lives with     4 grown children     Family History:  Family History   Problem Relation Age of Onset    Breast Cancer Self  62    Cancer Mother         lung cancer    Heart Disorder Father         arrhythmia    Breast Cancer Sister 64    Other (Other) Brother         AV malformation     Allergies:  No Known Allergies  Current Meds:  Current Outpatient Medications   Medication Sig Dispense Refill    LISINOPRIL 20 MG Oral Tab TAKE 1 TABLET BY MOUTH EVERY DAY 90 tablet 0    LISINOPRIL-HYDROCHLOROTHIAZIDE 20-25 MG Oral Tab TAKE 1 TABLET BY MOUTH EVERY DAY 90 tablet 0    ATORVASTATIN 10 MG Oral Tab TAKE 1 TABLET BY MOUTH EVERY DAY AT NIGHT 90 tablet 0    letrozole 2.5 MG Oral Tab Take 1 tablet (2.5 mg total) by mouth daily. 90 tablet 3    aspirin 81 MG Oral Tab EC Take 1 tablet (81 mg total) by mouth daily.      Acetaminophen  MG Oral Tab CR Take 1 tablet (650 mg total) by mouth every 8 (eight) hours as needed for Pain. Two tabs daily      Multiple Vitamins-Minerals (MULTIVITAMIN OR) Take  by mouth.      amLODIPine 5 MG Oral Tab Take 1.5 tablets (7.5 mg total) by mouth daily. 135 tablet 0    citalopram 20 MG Oral Tab TAKE 1 TABLET BY MOUTH EVERY DAY 90 tablet 0    ALPRAZolam 0.25 MG Oral Tab TAKE 1 TABLET BY MOUTH TWICE DAILY AS NEEDED FOR ANXIETY NO DRIVING AND NO ALCOHOL (Patient not taking: Reported on 9/12/2024) 30 tablet 1    Mometasone Furoate 0.1 % External Cream Apply BID during radiation therapy. 50g is a 2 week supply. (Patient not taking: Reported on 4/25/2024) 50 g 5    B Complex-C-E-Zn (BEC/ZINC) Oral Tab Take 1 tablet by mouth daily. (Patient not taking: Reported on 9/12/2024)      Omega-3 Fatty Acids (FISH OIL BURP-LESS OR) Take by mouth. (Patient not taking: Reported on 9/12/2024)      Vitamin D3 1000 units Oral Tab Take 1 tablet (1,000 Units total) by mouth daily. Takes 4,000 IU by mouth daily (Patient not taking: Reported on 9/12/2024)        Counseling given: Not Answered       REVIEW OF SYSTEMS:   Consitutional: No fevers, chills, sweats  Eye: No recent visual problems  ENMT: No ear pain nasal congestion sore  throat  Respiratory: No shortness of breath, cough  Cardiovascular: No chest pain palpitations syncope  Gastrointestinal: No nausea vomiting diarrhea  Genitourinary: No hematuria      Medical, surgical, family, and social histories were reviewed      EXAM:   VITALS:   Vitals:    09/12/24 1240   BP: 138/80   Pulse: 90   Resp: 18   Temp: 97.5 °F (36.4 °C)      GENERAL: well developed, well nourished, in no apparent distress  SKIN: no rashes, no suspicious lesions: Cool and Dry  HEENT: atraumatic, normocephalic, ears and throat are clear    Ears: TM's clear and visible bilaterally, no excess cerumen or erythema.   EYES: Pupils equal round and reactive.  Extraocular motions intact no scleral icterus no injection or drainage  THROAT without erythema tonsillar hypertrophy or exudate.  Uvula midline airway patent  NECK: Given midline.  No JVD or lymphadenopathy supple nontender no meningeal signs   LUNGS: clear to auscultation sounds equal bilaterally no wheezes rales or rhonchi  CARDIO: Regular rate and rhythm without murmurs gallops or rubs      ASSESSMENT AND PLAN:   1. Hypercholesteremia  - Comp Metabolic Panel (14)  - Lipid Panel  - TSH and Free T4  - Triiodothyronine (T3) Total  I did discuss with the patient at this time would suggest updating her blood work she need a CMP lipid panel TSH T3-T4.  She was asked to continue to watch her diet decreasing fatty food fried food intake we will continue with the atorvastatin.  2. Benign essential HTN  I did discuss with the patient her blood pressure is controlled still slightly elevated she was asked to watch her salt intake was asked to continue with the lisinopril hydrochlorothiazide and amlodipine she was given refills of these.    Meds & Refills for this Visit:  Requested Prescriptions      No prescriptions requested or ordered in this encounter       Health Maintenance:  Health Maintenance Due   Topic Date Due    Pneumococcal Vaccine: Birth to 64yrs (1 of 2 - PCV)  Never done    Annual Physical  07/13/2024    COVID-19 Vaccine (3 - 2023-24 season) 09/01/2024       Patient/Caregiver Education: Patient/Caregiver Education: There are no barriers to learning. Medical education done.   Outcome: Patient verbalizes understanding. Patient is notified to call with any questions, complications, allergies, or worsening or changing symptoms.  Patient is to call with any side effects or complications from the treatments as a result of today.     Problem List:  Patient Active Problem List   Diagnosis    Depression    Overweight(278.02)    Generalized anxiety disorder    Benign essential HTN    Primary osteoarthritis of right knee    History of cervical dysplasia    Hypercholesteremia    Malignant neoplasm of upper-inner quadrant of left breast in female, estrogen receptor positive (HCC)    Glucose intolerance         No follow-ups on file.     Nj So MD    Please note that portions of this note may have been completed with a voice recognition program. Efforts were made to edit the dictations but occasionally words are mis-transcribed.

## 2024-09-16 ENCOUNTER — HOSPITAL ENCOUNTER (OUTPATIENT)
Dept: MAMMOGRAPHY | Facility: HOSPITAL | Age: 64
Discharge: HOME | End: 2024-09-16
Attending: SURGERY
Payer: COMMERCIAL

## 2024-09-16 DIAGNOSIS — R92.1 BREAST CALCIFICATION, LEFT: ICD-10-CM

## 2024-09-16 DIAGNOSIS — Z85.3 HISTORY OF LEFT BREAST CANCER: ICD-10-CM

## 2024-09-16 PROCEDURE — 19081 BX BREAST 1ST LESION STRTCTC: CPT | Performed by: SURGERY

## 2024-09-16 PROCEDURE — 88305 TISSUE EXAM BY PATHOLOGIST: CPT | Performed by: SURGERY

## 2024-09-16 PROCEDURE — 88344 IMHCHEM/IMCYTCHM EA MLT ANTB: CPT | Performed by: SURGERY

## 2024-09-16 NOTE — DISCHARGE INSTRUCTIONS
Discharge Instructions  Stereotactic / Ultrasound / MRI Core Biopsy     Place an ice pack on top of the biopsy site in your bra OR the folds of the Ace wrap for 10-15 minutes every hour until bedtime for your comfort and to decrease bleeding.     Keep your supportive bra OR the Ace wrap in place for 24 hours after your biopsy. This compression decreases bleeding and breast movement for your comfort. Wear a supportive bra for the next couple days for comfort (as well as for sleeping)     No bath or shower during the first 24 hours after biopsy. After this time, you may take a bath or shower. It’s okay if the steri-strips get wet but don’t soak them.   No saunas, hot tubs, or swimming pools until the steri-strips fall off (5-7days).  This prevents infection and allows time for the area to completely close and heal.     DO NOT remove the steri-strips. They will fall off in 5 days to two weeks. If any type of irritation (redness, itching, OR blisters) develops in the area around the steri-strips, remove them gently. Keep the area clean and dry.     It is normal to have mild discomfort and bruising at the biopsy site.      You may take Tylenol as needed for discomfort.     DO NOT participate in strenuous activity (aerobics, heavy lifting, housework, gardening, etc.) 48 hours after your biopsy to prevent bleeding.     You will receive results typically within 2-3 business days. Occasionally, the specimen is sent off-site for a 2nd opinion. You will be notified when this occurs as this will delay your results.     If you have any questions about the procedure or your results, please contact the Breast Care Coordinator Nurse at [phone]. (M-F 7:30-4)    If you are having a MRI Breast Biopsy or an Ultrasound guided biopsy, you will be billed for the biopsy and a unilateral mammogram separately.    A 6-month or one year follow-up Diagnostic Mammogram/Ultrasound will be recommended to document stability of the biopsy  site. It may be scheduled at [facility] or [facility] by calling [phone]. You will need an order for this exam from your referring physician.       5/2024

## 2024-09-17 RX ORDER — AMLODIPINE BESYLATE 5 MG/1
7.5 TABLET ORAL DAILY
Qty: 135 TABLET | Refills: 0 | OUTPATIENT
Start: 2024-09-17

## 2024-09-17 RX ORDER — CITALOPRAM HYDROBROMIDE 20 MG/1
TABLET ORAL
Qty: 90 TABLET | Refills: 0 | OUTPATIENT
Start: 2024-09-17

## 2024-09-24 ENCOUNTER — OFFICE VISIT (OUTPATIENT)
Dept: SURGERY | Facility: CLINIC | Age: 64
End: 2024-09-24
Payer: COMMERCIAL

## 2024-09-24 VITALS
BODY MASS INDEX: 39 KG/M2 | OXYGEN SATURATION: 97 % | RESPIRATION RATE: 18 BRPM | SYSTOLIC BLOOD PRESSURE: 132 MMHG | TEMPERATURE: 98 F | HEART RATE: 100 BPM | WEIGHT: 255.81 LBS | DIASTOLIC BLOOD PRESSURE: 75 MMHG

## 2024-09-24 DIAGNOSIS — Z17.0 MALIGNANT NEOPLASM OF UPPER-INNER QUADRANT OF LEFT BREAST IN FEMALE, ESTROGEN RECEPTOR POSITIVE (HCC): Primary | ICD-10-CM

## 2024-09-24 DIAGNOSIS — C50.212 MALIGNANT NEOPLASM OF UPPER-INNER QUADRANT OF LEFT BREAST IN FEMALE, ESTROGEN RECEPTOR POSITIVE (HCC): Primary | ICD-10-CM

## 2024-09-24 PROCEDURE — 99213 OFFICE O/P EST LOW 20 MIN: CPT | Performed by: SURGERY

## 2024-09-26 RX ORDER — ATORVASTATIN CALCIUM 10 MG/1
10 TABLET, FILM COATED ORAL NIGHTLY
Qty: 90 TABLET | Refills: 0 | Status: SHIPPED | OUTPATIENT
Start: 2024-09-26

## 2024-09-30 NOTE — PROGRESS NOTES
Breast Surgery Surveillance Visit    Diagnosis: Left breast lumpectomy with SLNB. Left breast two site excisional biopsy. Right breast excisional biopsy on 11/14/2023    Disease Status: Surgery completed, radiation completed, letrozole ongoing per medical oncology    History of Present Illness:   Ms. Claudia Paniagua is a 63 year old woman who presents with imaging detected left breast cancer.  The patient denies any palpable masses, nipple discharge, skin changes or axillary symptoms.  She does have a family history of breast cancer.  She has a remote history of a benign biopsy from the left breast.  She had her screening mammogram on September 6, 2023 and was found to have indeterminate mass in the left breast with equivocal distortion of the right breast.  She had a diagnostic evaluation on September 20, 2023 that confirmed an irregular mass in the 9 o'clock position of the left breast measuring up to 2.2 cm with normal-appearing left axillary lymph nodes and what appeared to be benign appearing cysts in the right breast.  Ultrasound-guided biopsy of the left breast mass took place on September 28, 2023 confirming invasive ductal carcinoma, grade 1 that was ER/SD positive, HER2/nasreen negative with a Ki-67 of 5%. She underwent left breast lumpectomy with SLNB, two site excisional biopsy, right breast excisional biopsy, which occurred without complication. She denies any new clinical concerns.  She underwent radiation without sequelae and is tolerating letrozole.  She had a bilateral diagnostic evaluation on September 9, 2024 and was found to have developing calcifications at 2:00 in the left breast that underwent a biopsy on September 16, 2024 and confirmed benign postradiation changes.  She is here today for evaluation and recommendations for further therapy.        Past Medical History:    Anxiety state    Breast cancer (HCC)    Depression    High blood pressure    High cholesterol    Osteoarthritis     Other screening mammogram    Routine Papanicolaou smear    Visual impairment    reading glasses       Past Surgical History:   Procedure Laterality Date          x2    Egd N/A 12/15/2016    Procedure: ESOPHAGOGASTRODUODENOSCOPY, COLONOSCOPY, POSSIBLE BIOPSY, POSSIBLE POLYPECTOMY 24381, 38926;  Surgeon: Ron Lehman MD;  [facility]    Lumpectomy left  2023    [facility]    Vibha localization wire 1 site right (cpt=19281)  2023    Papilloma    Mri breast biopsy w/ clip 1 site left (cpt=19085)  10/2023    papilloma    Mri breast biopsy w/ clip 1 site right (cpt=19085)  10/2023    papilloma    Needle biopsy left Left 2016    fibrocystic changes    Needle biopsy left  2023    US guided biopsy -[facility]    Needle biopsy left  10/2023    US guided biopsy -papilloma    Radiation left         Gynecological History:  Pt is a   Pt was 31 years old at time of first pregnancy.    She has cumulative breastfeeding history of 38 months.  She achieved menarche at age 14 and LMP age 57  Age of Menopause: 57  Type: natural menopause  She denies any history of hormone replacement therapy   She has history of oral contraceptive use, last in .  She denies infertility treatment to achieve pregnancy.    Medications:     amLODIPine 5 MG Oral Tab Take 1.5 tablets (7.5 mg total) by mouth daily. 135 tablet 0    citalopram 20 MG Oral Tab TAKE 1 TABLET BY MOUTH EVERY DAY 90 tablet 0    ALPRAZolam 0.25 MG Oral Tab TAKE 1 TABLET BY MOUTH TWICE DAILY AS NEEDED FOR ANXIETY NO DRIVING AND NO ALCOHOL 30 tablet 1    LISINOPRIL 20 MG Oral Tab TAKE 1 TABLET BY MOUTH EVERY DAY 90 tablet 0    LISINOPRIL-HYDROCHLOROTHIAZIDE 20-25 MG Oral Tab TAKE 1 TABLET BY MOUTH EVERY DAY 90 tablet 0    [DISCONTINUED] ATORVASTATIN 10 MG Oral Tab TAKE 1 TABLET BY MOUTH EVERY DAY AT NIGHT 90 tablet 0    letrozole 2.5 MG Oral Tab Take 1 tablet (2.5 mg total) by mouth daily. 90 tablet 3    Mometasone Furoate 0.1 % External Cream  Apply BID during radiation therapy. 50g is a 2 week supply. 50 g 5    B Complex-C-E-Zn (BEC/ZINC) Oral Tab Take 1 tablet by mouth daily.      aspirin 81 MG Oral Tab EC Take 1 tablet (81 mg total) by mouth daily.      Omega-3 Fatty Acids (FISH OIL BURP-LESS OR) Take by mouth.      Vitamin D3 1000 units Oral Tab Take 1 tablet (1,000 Units total) by mouth daily. Takes 4,000 IU by mouth daily      Acetaminophen  MG Oral Tab CR Take 1 tablet (650 mg total) by mouth every 8 (eight) hours as needed for Pain. Two tabs daily      Multiple Vitamins-Minerals (MULTIVITAMIN OR) Take  by mouth.         Allergies:    No Known Allergies    Family History:   Family History   Problem Relation Age of Onset    Breast Cancer Self 62    Cancer Mother         lung cancer    Heart Disorder Father         arrhythmia    Breast Cancer Sister 64    Other (Other) Brother         AV malformation       She is not of Ashkenazi Alevism ancestry.    Social History:  History   Alcohol Use    Yes     Comment: 2 glasse of wine at night        History   Smoking Status    Never   Smokeless Tobacco    Never       Ms. Claudia Paniagua is  with 4 children. She has 4 siblings. She is currently Homemaker    Review of Systems:  General:   The patient denies, fever, chills, night sweats, fatigue, generalized weakness, change in appetite or weight loss.    HEENT:     The patient denies eye irritation, cataracts, redness, glaucoma, yellowing of the eyes, change in vision, color blindness, or wearing contacts/glasses. The patient denies hearing loss, ringing in the ears, ear drainage, earaches, nasal congestion, nose bleeds, snoring, pain in mouth/throat, hoarseness, change in voice, facial trauma.    Respiratory:  The patient denies chronic cough, phlegm, hemoptysis, pleurisy/chest pain, pneumonia, asthma, wheezing, difficulty in breathing with exertion, emphysema, chronic bronchitis, shortness of breath or abnormal sound when breathing.      Cardiovascular:  There is no history of chest pain, chest pressure/discomfort, palpitations, irregular heartbeat, fainting or near-fainting, difficulty breathing when lying flat, SOB/Coughing at night, swelling of the legs or chest pain while walking.    Breasts:  See history of present illness    Gastrointestinal:     There is no history of difficulty or pain with swallowing, reflux symptoms, vomiting, dark or bloody stools, constipation, yellowing of the skin, indigestion, nausea, change in bowel habits, diarrhea, abdominal pain or vomiting blood.     Genitourinary:  The patient denies frequent urination, needing to get up at night to urinate, urinary hesitancy or retaining urine, painful urination, urinary incontinence, decreased urine stream, blood in the urine or vaginal/penile discharge.    Skin:    The patient denies rash, itching, skin lesions, dry skin, change in skin color or change in moles.     Hematologic/Lymphatic:  The patient denies easily bruising or bleeding or persistent swollen glands or lymph nodes.     Musculoskeletal:  The patient denies muscle aches/pain, +joint pain, stiff joints, neck pain, back pain or bone pain.    Neuropsychiatric:  There is no history of migraines or severe headaches, seizure/epilepsy, speech problems, coordination problems, trembling/tremors, fainting/black outs, dizziness, memory problems, loss of sensation/numbness, problems walking, weakness, tingling or burning in hands/feet. There is no history of abusive relationship, bipolar disorder, sleep disturbance, +anxiety, +depression or feeling of despair.    Endocrine:    There is no history of poor/slow wound healing, weight loss/gain, fertility or hormone problems, cold intolerance, thyroid disease.     Allergic/Immunologic:  There is no history of hives, hay fever, angioedema or anaphylaxis.    /75 (BP Location: Left arm, Patient Position: Sitting, Cuff Size: adult)   Pulse 100   Temp 97.7 °F (36.5 °C)  (Temporal)   Resp 18   Wt 116 kg (255 lb 12.8 oz)   LMP 11/02/2011 (Approximate)   SpO2 97%   BMI 38.90 kg/m²     Physical Exam:  The patient is an alert, oriented, well-nourished and  well-developed woman who appears her stated age. Her speech patterns and movements are normal. Her affect is appropriate.    HEENT: The head is normocephalic. The neck is supple. The thyroid is not enlarged and is without palpable masses/nodules. There are no palpable masses. The trachea is in the midline. Conjunctiva are clear, non-icteric.    Chest: The chest expands symmetrically. The lungs are clear to auscultation.    Heart: The rhythm is regular.  There are no murmurs, rubs, gallops or thrills.    Breasts:  Her breasts are symmetrical with a cup size 40B.  Right breast: The skin, nipple ,and areola appear normal. There is no skin dimpling with movement of the pectoralis. There is no nipple retraction. No nipple discharge can be elicited. The parenchyma is mildly nodular. There are no dominant masses in the breast. The axillary tail is normal.  There is a well-healed incision with no signs of infection.  Left breast:   The skin, nipple, and areola appear normal. There is no skin dimpling with movement of the pectoralis. There is no nipple retraction.  Positive serous discharge can be elicited from 2 discrete ducts. The parenchyma is mildly nodular. There are no dominant masses in the breast.  Well-healed incision with associated lymphedema on the chest.  The axillary tail is normal.    Abdomen:  The abdomen is soft, flat and non tender. The liver is not enlarged. There are no palpable masses.    Lymph Nodes:  The supraclavicular, axillary and cervical regions are free of significant lymphadenopathy.    Back: There is no vertebral column tenderness.    Skin: The skin appears normal. There are no suspicious appearing rashes or lesions.    Extremities: The extremities are without deformity, cyanosis or edema.    Impression:    Ms. Claudia Paniagua is a 63 year old woman presents with family history of breast cancer, dense breast tissue and biopsy confirmed left breast cancer this post excision of right breast papilloma, left breast lumpectomy with sentinel lymph node biopsy and excision of left breast papillomas x2.    Discussion and Plan:  I had a discussion with the Patient regarding her breast exam. On exam today I found to have healed well since surgery.  She did develop lymphedema of the left breast status post completion of her radiation.  She had a recent imaging and pathology that I reviewed that showed benign postradiation changes.  I recommend no acute intervention for this.  I do recommend she sees a lymphedema therapist given the significant lymphedema on the left breast.  Will plan to have her get an MRI before the end of the year given her density and plan for left diagnostic evaluation in March 2025 with a clinical exam to follow.  She continues to follow with medical oncology for management of her letrozole.   She was given ample opportunity for questions and those questions were answered to her satisfaction. She has been  encouraged to contact the office with any questions or concerns prior to her next appointment.     This encounter lasted a total of 25 minutes, more than 50% of which was dedicated to the discussion of management options.

## 2024-10-08 RX ORDER — LISINOPRIL AND HYDROCHLOROTHIAZIDE 20; 25 MG/1; MG/1
1 TABLET ORAL DAILY
Qty: 90 TABLET | Refills: 0 | OUTPATIENT
Start: 2024-10-08

## 2024-10-16 ENCOUNTER — TELEPHONE (OUTPATIENT)
Dept: PHYSICAL THERAPY | Facility: HOSPITAL | Age: 64
End: 2024-10-16

## 2024-10-16 NOTE — TELEPHONE ENCOUNTER
Called pt to schedule lymphedema therapy at [place] location. Message left for her to call back. If pt calls back please schedule at the [facility] location ([place] location still does not have availability at this time)- 90 min evaluation (with Regine STALEY), and 45 min treats (Regine or Bethany) 2x/wk x 4 weeks.

## 2024-10-24 DIAGNOSIS — R79.89 ELEVATED TSH: Primary | ICD-10-CM

## 2024-10-24 LAB
ALBUMIN/GLOBULIN RATIO: 1.6 (CALC) (ref 1–2.5)
ALBUMIN: 4.2 G/DL (ref 3.6–5.1)
ALKALINE PHOSPHATASE: 103 U/L (ref 37–153)
ALT: 27 U/L (ref 6–29)
AST: 22 U/L (ref 10–35)
BILIRUBIN, TOTAL: 0.9 MG/DL (ref 0.2–1.2)
BUN: 16 MG/DL (ref 7–25)
CALCIUM: 10.2 MG/DL (ref 8.6–10.4)
CARBON DIOXIDE: 26 MMOL/L (ref 20–32)
CHLORIDE: 103 MMOL/L (ref 98–110)
CHOL/HDLC RATIO: 3.1 (CALC)
CHOLESTEROL, TOTAL: 187 MG/DL
CREATININE: 0.5 MG/DL (ref 0.5–1.05)
EGFR: 107 ML/MIN/1.73M2
GLOBULIN: 2.7 G/DL (CALC) (ref 1.9–3.7)
GLUCOSE: 104 MG/DL (ref 65–99)
HDL CHOLESTEROL: 61 MG/DL
LDL-CHOLESTEROL: 105 MG/DL (CALC)
NON-HDL CHOLESTEROL: 126 MG/DL (CALC)
POTASSIUM: 4.1 MMOL/L (ref 3.5–5.3)
PROTEIN, TOTAL: 6.9 G/DL (ref 6.1–8.1)
SODIUM: 140 MMOL/L (ref 135–146)
T3, TOTAL: 96 NG/DL (ref 76–181)
T4, FREE: 0.9 NG/DL (ref 0.8–1.8)
TRIGLYCERIDES: 118 MG/DL
TSH: 6.11 MIU/L (ref 0.4–4.5)

## 2024-10-25 ENCOUNTER — TELEPHONE (OUTPATIENT)
Dept: FAMILY MEDICINE CLINIC | Facility: CLINIC | Age: 64
End: 2024-10-25

## 2024-10-25 NOTE — TELEPHONE ENCOUNTER
Patient dropped off forms that need to be signed faxed and mailed back to patient. Placed forms on MA's desk.

## 2024-11-01 ENCOUNTER — MED REC SCAN ONLY (OUTPATIENT)
Dept: FAMILY MEDICINE CLINIC | Facility: CLINIC | Age: 64
End: 2024-11-01

## 2024-11-05 ENCOUNTER — TELEPHONE (OUTPATIENT)
Dept: PHYSICAL THERAPY | Facility: HOSPITAL | Age: 64
End: 2024-11-05

## 2024-11-05 ENCOUNTER — OFFICE VISIT (OUTPATIENT)
Dept: PHYSICAL THERAPY | Facility: HOSPITAL | Age: 64
End: 2024-11-05
Attending: INTERNAL MEDICINE
Payer: COMMERCIAL

## 2024-11-05 DIAGNOSIS — I89.0 LYMPHEDEMA OF BREAST: Primary | ICD-10-CM

## 2024-11-05 PROCEDURE — 97162 PT EVAL MOD COMPLEX 30 MIN: CPT | Performed by: PHYSICAL THERAPIST

## 2024-11-05 PROCEDURE — 97530 THERAPEUTIC ACTIVITIES: CPT | Performed by: PHYSICAL THERAPIST

## 2024-11-05 PROCEDURE — 97140 MANUAL THERAPY 1/> REGIONS: CPT | Performed by: PHYSICAL THERAPIST

## 2024-11-05 NOTE — PROGRESS NOTES
Referring Provider:  Betty  Diagnosis: Lymphedema of breast (I89.0)     Date of onset: March 2024 Evaluation Date: 11/5/2024         LYMPHEDEMA EVALUATION:        PATIENT SUMMARY   Claudia Paniagua is a 64 year old female who presents to therapy today for evaluation of breast swelling    History of current condition: L breast cancer, 11/14/2023 L lumpectomy w/ SLNB (1+/1), R lumpectomy (papilloma). L breast radiation. Pt reporting onset of swelling and firmness a few months after radiation.   Pain level 1-2/10  L axilla       Current functional limitations: independent w/ ADLs, but w/ discomfort and w/ swelling    Claudia describes prior level of function as independent w/ ADLs    Social History:  Lives w/ family   Hand Dominance: left handed  Pt goals include reduce swelling     Are you being hurt, frightened, demeaned, or taken advantage of by anyone at your home or in your life?  No        Have you recently had thoughts of hurting yourself?  No    Have you tried to hurt yourself in the past?  No        Past medical history was reviewed with Claudia.   Past Medical History:    Anxiety state    Breast cancer (HCC)    Depression    High blood pressure    High cholesterol    Osteoarthritis    Other screening mammogram    Routine Papanicolaou smear    Visual impairment    reading glasses         Precautions:  Breast cancer, HTN  Education or treatment limitation: None  Rehab Potential:good        ASSESSMENT:  Claudia presents to [facility] evaluation with swelling L breast w/ skin changes, swelling L trunk, decreased L breast scar mobility. L upper arm larger than R, but pt is L arm dominant (therefore increased size may be normal vs swelling).     Reason for lymphedema: Secondary Lymphedema, appears to be due to lymphedema  Stage of lymphedema: late stage 1, progressing towards stage 2  Phase of treatment: Phase 1: Reduction Phase  Has patient been treated for lymphedema before, if  yes, when: no  Does the patient have a compression garment: no     Pt and therapist discussed evaluation findings, lymphedema education, POC and self care/management. Skilled Physical Therapy is medically necessary for stretching, strengthening, posture re-education,  Complete Decongestive Therapy to reduce swelling and decrease risk of infection, garment prescription , compression device prescription and education/self management.        OBJECTIVE:    11/5/2024 (Evaluation):  Sensation:  numbness L axilla    AROM/Strength:  B shld AROM WFL but w/ reports of tightness on left, strength L shld 4+/5, R 4/5    Posture: good      Edema/Tissue Observations:     - swelling L breast, + pitting, hyperplasia, Hyperpigmentation,  - swelling L trunk  - decreased scar mobility (L medial areola), tissue puckering   - no visual swelling of LUE but upper arm is larger than RUE (pt is L arm dominant)     Trunk Measurement:   - 12 cm inf to sternal notch: 116.8  - 18 cm inf to sternal notch: 118.8   - 20 cm inf to sternal notch:  120.2        Stemmer's Sign: negative     Arm Volume Measurements:       11/5/2024     8:00 AM   Lymphedema Calculations   DATE MEASURED 11/5/2024   LOCATION/MEASUREMENTS LUE   PATIENT POSITION  supine   LIMB POSITION 75 degrees abd   STARTING POINT 18 cm from 3rd cuticl;e   RIGHT HAND VOLUME 20.3 across palm   LEFT HAND VOLUME 20.1 across palm   MEASUREMENT A 17.7   MEASUREMENT B 20.8   MEASUREMENT C 23.8   MEASUREMENT D 27.3   MEASUREMENT E 29.4   MEASUREMENT F 31.8   MEASUREMENT G 34.5   MEASUREMENT H 39.8   MEASUREMENT I 45.1   MEASUREMENT J 49.1    TOTAL VOLUME  [id number]   DATE MEASURED 11/5/2024   LOCATION/MEASUREMENTS RUE   MEASUREMENT A 17.7   MEASUREMENT B 20.2   MEASUREMENT C 23.6   MEASUREMENT D 28   MEASUREMENT E 30.5   MEASUREMENT F 31.8   MEASUREMENT G 34.7   MEASUREMENT H 37.8   MEASUREMENT I 42.3   MEASUREMENT J 47.6    TOTAL VOLUME  [id number]   % DIFFERENCE [id number]             Lymphedema  Life Impact Scale: Evaluation Score 11/5/2024: LLIS Score Evaluation: 4.5 % impaired. (0% is no impairment, 100% total impairment)       Today’s Treatment and Response:   Date 11/5/2024 Date: * Date: * Date: * Date: * Date: *   Visit # 1/10  Certification From: 11/5/2024  To:2/3/2025    Visit # 2/* Visit: #3/* Visit: #4/* Visit: #5/* Visit: #6/*   Manual Therapy (40 minutes)    Scar massage    MLD: neck sequence, abd sequence R axilla, A-A, L inguinal, L A-I, L axilla, L breast, L UE. Increased time on left breast.       Compression:  - pt to wear her usual bra she wears at next session to assess if add'l compression needed  - will monitor LUE if compression sleeve would be beneficial            There Ex (  minutes):            ThereAct (10 min):       Self-Care:  Management/Education: Explained Lymphedema diagnosis; informed patient of lymphedema precautions and risk reduction practices, and importance of skin care.                   Goals (discussed and planned with patient involvement):      To be met in 10 visits:  1) Decrease swelling LUE to be less than 2 % greater than unaffected arm to allow patient to be fitted for compression garments if needed   2) Decrease swelling L breast and trunk by a total of 3 cm with improved tissue mobilibty to decrease risks of infection  3) Improve L breast scar mobility to WFL to improve visual appearance of breast  4) Pt to be independent with self MLD, ROM exercises, and donning/doffing compression bandages/garments to facilitate swelling reduction and to be independent at self management once discharged        PLAN:  Frequency / Duration: Patient will be seen for 1-2 x/week or a total of 10 visits over a 90 day period. Frequency will decrease as symptoms improve.     Treatment will include: Complete Decongestive Therapy: Manual Therapy, Self-Care/Home Management Training, Therapeutic Exercise, Neuromuscular Re-education, Orthotic Management and Training        Charges:  Physical Therapy Eval: Moderate Complexity, mm3, act1      Total Timed Treatment: 50 min     Total Treatment Time: 80 min     Based on clinical rationale and outcome measures, this evaluation is   evolving (mod eval: 1-2 personal factors and/or comorbidities that impact plan of care, 30 min eval, 3 or more tests/measures)  unstable (high eval, 3 or more personal factors and/or comorbidities that impact plan of care, 45 min eval, 4 or more tests/measures) Close clinical monitoring of symptoms and response to treatment interventions will be necessary due to chronicity of and progression of swelling, and use of varying compression, patient's compliance, risk of infections associated with lymphedema and several personal factors and comorbidities.      Patient/Family/Caregiver was advised of these findings, precautions, and treatment options and has agreed to actively participate in planning and for this course of care.    Thank you for your referral. Please co-sign or sign and return this letter via fax as soon as possible to [phone]. If you have any questions, please contact me at Dept: [phone]    Sincerely,  Electronically signed by therapist: Regine White PT, DPT, CLSIERRA  Physician's certification required: Yes  I certify the need for these services furnished under this plan of treatment and while under my care.    X___________________________________________________ Date____________________    Certification From: 11/5/2024  To:2/3/2025

## 2024-11-07 ENCOUNTER — OFFICE VISIT (OUTPATIENT)
Dept: PHYSICAL THERAPY | Facility: HOSPITAL | Age: 64
End: 2024-11-07
Attending: INTERNAL MEDICINE
Payer: COMMERCIAL

## 2024-11-07 PROCEDURE — 97530 THERAPEUTIC ACTIVITIES: CPT

## 2024-11-07 PROCEDURE — 97140 MANUAL THERAPY 1/> REGIONS: CPT

## 2024-11-07 NOTE — PROGRESS NOTES
Referring Provider:  Betty  Diagnosis: Lymphedema of breast (I89.0)      Date of onset: March 2024 Evaluation Date: 11/5/2024     Physical Therapy Lymphedema Daily Note         Precautions:  Breast cancer, HTN  Education or treatment limitation: None  Rehab Potential:good          Past Medical History:    Anxiety state    Breast cancer (HCC)    Depression    High blood pressure    High cholesterol    Osteoarthritis    Other screening mammogram    Routine Papanicolaou smear    Visual impairment    reading glasses         Pain:  1-2/10 L axilla       Subjective: Pt states that her breast feels hard again.  \"It was soft for a little bit following the first treatment.      Objective:      11/5/2024 (Evaluation):  Sensation:  numbness L axilla     AROM/Strength:  B shld AROM WFL but w/ reports of tightness on left, strength L shld 4+/5, R 4/5     Posture: good        Edema/Tissue Observations:     - swelling L breast, + pitting, hyperplasia, Hyperpigmentation,  - swelling L trunk  - decreased scar mobility (L medial areola), tissue puckering   - no visual swelling of LUE but upper arm is larger than RUE (pt is L arm dominant)      Trunk Measurement:   - 12 cm inf to sternal notch: 116.8  - 18 cm inf to sternal notch: 118.8   - 20 cm inf to sternal notch:  120.2         Stemmer's Sign: negative      Arm Volume Measurements:        11/5/2024     8:00 AM   Lymphedema Calculations   DATE MEASURED 11/5/2024   LOCATION/MEASUREMENTS LUE   PATIENT POSITION  supine   LIMB POSITION 75 degrees abd   STARTING POINT 18 cm from 3rd cuticl;e   RIGHT HAND VOLUME 20.3 across palm   LEFT HAND VOLUME 20.1 across palm   MEASUREMENT A 17.7   MEASUREMENT B 20.8   MEASUREMENT C 23.8   MEASUREMENT D 27.3   MEASUREMENT E 29.4   MEASUREMENT F 31.8   MEASUREMENT G 34.5   MEASUREMENT H 39.8   MEASUREMENT I 45.1   MEASUREMENT J 49.1    TOTAL VOLUME  3887.4451   DATE MEASURED 11/5/2024   LOCATION/MEASUREMENTS RUE   MEASUREMENT A 17.7    MEASUREMENT B 20.2   MEASUREMENT C 23.6   MEASUREMENT D 28   MEASUREMENT E 30.5   MEASUREMENT F 31.8   MEASUREMENT G 34.7   MEASUREMENT H 37.8   MEASUREMENT I 42.3   MEASUREMENT J 47.6    TOTAL VOLUME  3733.86510   % DIFFERENCE 4.16335                           Lymphedema Life Impact Scale: Evaluation Score 11/5/2024: LLIS Score Evaluation: 4.5 % impaired. (0% is no impairment, 100% total impairment)         Today’s Treatment and Response:   Date 11/5/2024 Date: 11/7/2024 Date: * Date: * Date: * Date: *   Visit # 1/10  Certification From: 11/5/2024  To:2/3/2025     Visit # 2/10  Certification From: 11/5/2024  To:2/3/2025  Visit: #3/* Visit: #4/* Visit: #5/* Visit: #6/*   Manual Therapy (40 minutes)     Scar massage     MLD: neck sequence, abd sequence R axilla, A-A, L inguinal, L A-I, L axilla, L breast, L UE. Increased time on left breast.         Compression:  - pt to wear her usual bra she wears at next session to assess if add'l compression needed  - will monitor LUE if compression sleeve would be beneficial        Manual therapy (30 minutes)      Scar massage     MLD: neck sequence, abd sequence R axilla, A-A, L inguinal, L A-I, L axilla, L breast, L UE. Increased time on left breast.     Compression:  -assessed bra  -recommended bra with better side trunk coverage (ie, Priarie Wear [employer])           There Ex (  minutes):        There Ex ()           ThereAct (10 min):         Self-Care:  Management/Education: Explained Lymphedema diagnosis; informed patient of lymphedema precautions and risk reduction practices, and importance of skin care.   There Act (15 minutes)    -demo self breast massage.  Hand placement and pressure  -reinforced importance of daily breast massage as well as applying lotion especially to the radiated L breast.  -trial several different types of bras.  Fleming jorge appears to have good coverage.  Pt tried on XL but may be better fit in XXL.    -info given on store in [place]  ([employer]) if she wants to try on bra or on-line ordering with promo code.                  Assessment: Pt will need to purchase better fitting bras with better compression and better coverage.  Trial of bras in clinic.  Initiated instruction on self breast massage and importance of daily massage and applying lotion to L breast.          Goals (discussed and planned with patient involvement):      To be met in 10 visits:  1) Decrease swelling LUE to be less than 2 % greater than unaffected arm to allow patient to be fitted for compression garments if needed   2) Decrease swelling L breast and trunk by a total of 3 cm with improved tissue mobilibty to decrease risks of infection  3) Improve L breast scar mobility to WFL to improve visual appearance of breast  4) Pt to be independent with self MLD, ROM exercises, and donning/doffing compression bandages/garments to facilitate swelling reduction and to be independent at self management once discharged           PLAN:  Frequency / Duration: Patient will be seen for 1-2 x/week or a total of 10 visits over a 90 day period. Frequency will decrease as symptoms improve.      Treatment will include: Complete Decongestive Therapy: Manual Therapy, Self-Care/Home Management Training, Therapeutic Exercise, Neuromuscular Re-education, Orthotic Management and Training        Charges: MM2, There Act1   Total Timed Treatment: 45 min  Total Treatment Time: 45 min

## 2024-11-11 ENCOUNTER — OFFICE VISIT (OUTPATIENT)
Dept: PHYSICAL THERAPY | Facility: HOSPITAL | Age: 64
End: 2024-11-11
Attending: INTERNAL MEDICINE
Payer: COMMERCIAL

## 2024-11-11 PROCEDURE — 97140 MANUAL THERAPY 1/> REGIONS: CPT | Performed by: PHYSICAL THERAPIST

## 2024-11-11 NOTE — PROGRESS NOTES
Referring Provider:  Betty  Diagnosis: Lymphedema of breast (I89.0)      Date of onset: March 2024 Evaluation Date: 11/5/2024     Physical Therapy Lymphedema Daily Note         Precautions:  Breast cancer, HTN  Education or treatment limitation: None  Rehab Potential:good          Past Medical History:    Anxiety state    Breast cancer (HCC)    Depression    High blood pressure    High cholesterol    Osteoarthritis    Other screening mammogram    Routine Papanicolaou smear    Visual impairment    reading glasses         Pain:  1-2/10 L axilla       Subjective: \"It hardens back up\"     Objective:  11/11/2024:  Swelling L breast and trunk  L breast + pitting, hyperplasia, Hyperpigmentation,  \"Pimple\" inferior left breast/along bra band area      11/5/2024 (Evaluation):  Sensation:  numbness L axilla     AROM/Strength:  B shld AROM WFL but w/ reports of tightness on left, strength L shld 4+/5, R 4/5     Posture: good        Edema/Tissue Observations:     - swelling L breast, + pitting, hyperplasia, Hyperpigmentation,  - swelling L trunk  - decreased scar mobility (L medial areola), tissue puckering   - no visual swelling of LUE but upper arm is larger than RUE (pt is L arm dominant)      Trunk Measurement:   - 12 cm inf to sternal notch: 116.8  - 18 cm inf to sternal notch: 118.8   - 20 cm inf to sternal notch:  120.2         Stemmer's Sign: negative      Arm Volume Measurements:        11/5/2024     8:00 AM   Lymphedema Calculations   DATE MEASURED 11/5/2024   LOCATION/MEASUREMENTS LUE   PATIENT POSITION  supine   LIMB POSITION 75 degrees abd   STARTING POINT 18 cm from 3rd cuticl;e   RIGHT HAND VOLUME 20.3 across palm   LEFT HAND VOLUME 20.1 across palm   MEASUREMENT A 17.7   MEASUREMENT B 20.8   MEASUREMENT C 23.8   MEASUREMENT D 27.3   MEASUREMENT E 29.4   MEASUREMENT F 31.8   MEASUREMENT G 34.5   MEASUREMENT H 39.8   MEASUREMENT I 45.1   MEASUREMENT J 49.1    TOTAL VOLUME  [id number]   DATE MEASURED 11/5/2024    LOCATION/MEASUREMENTS RUE   MEASUREMENT A 17.7   MEASUREMENT B 20.2   MEASUREMENT C 23.6   MEASUREMENT D 28   MEASUREMENT E 30.5   MEASUREMENT F 31.8   MEASUREMENT G 34.7   MEASUREMENT H 37.8   MEASUREMENT I 42.3   MEASUREMENT J 47.6    TOTAL VOLUME  3733.46902   % DIFFERENCE 4.51887                           Lymphedema Life Impact Scale: Evaluation Score 11/5/2024: LLIS Score Evaluation: 4.5 % impaired. (0% is no impairment, 100% total impairment)         Today’s Treatment and Response:   Date 11/5/2024 Date: 11/7/2024 Date: 11/11/2024 Date: * Date: * Date: *   Visit # 1/10  Certification From: 11/5/2024  To:2/3/2025     Visit # 2/10  Certification From: 11/5/2024  To:2/3/2025  Visit: #3/10  Certification From: 11/5/2024  To:2/3/2025 Visit: #4/* Visit: #5/* Visit: #6/*   Manual Therapy (40 minutes)     Scar massage     MLD: neck sequence, abd sequence R axilla, A-A, L inguinal, L A-I, L axilla, L breast, L UE. Increased time on left breast.         Compression:  - pt to wear her usual bra she wears at next session to assess if add'l compression needed  - will monitor LUE if compression sleeve would be beneficial        Manual therapy (30 minutes)      Scar massage     MLD: neck sequence, abd sequence R axilla, A-A, L inguinal, L A-I, L axilla, L breast, L UE. Increased time on left breast.     Compression:  -assessed bra  -recommended bra with better side trunk coverage (ie, Priarie Wear [employer])  Manual Therapy (45 min)    Scar massage     MLD: neck sequence, abd sequence R axilla, A-A, L inguinal, L A-I, L axilla, L breast, L UE. Increased time on left breast.       Over-pressure on \"pimple\" area, firm whitish substance expelled, no bleeding or drainage, applied Antibacterial ointment over    Compression:  - fabricated foam compression pad for L trunk  - pt looking into new bras (has sports bra she will bring in to see if will be good)         There Ex (  minutes):        There Ex ()           ThereAct (10 min):          Self-Care:  Management/Education: Explained Lymphedema diagnosis; informed patient of lymphedema precautions and risk reduction practices, and importance of skin care.   There Act (15 minutes)    -demo self breast massage.  Hand placement and pressure  -reinforced importance of daily breast massage as well as applying lotion especially to the radiated L breast.  -trial several different types of bras.  Coweta jorge appears to have good coverage.  Pt tried on XL but may be better fit in XXL.    -info given on store in [place] ([employer]) if she wants to try on bra or on-line ordering with promo code.                  Assessment: Swelling decreasing slowly, tissue is softening         Goals (discussed and planned with patient involvement):      To be met in 10 visits:  1) Decrease swelling LUE to be less than 2 % greater than unaffected arm to allow patient to be fitted for compression garments if needed   2) Decrease swelling L breast and trunk by a total of 3 cm with improved tissue mobilibty to decrease risks of infection  3) Improve L breast scar mobility to WFL to improve visual appearance of breast  4) Pt to be independent with self MLD, ROM exercises, and donning/doffing compression bandages/garments to facilitate swelling reduction and to be independent at self management once discharged           PLAN:  Frequency / Duration: Patient will be seen for 1-2 x/week or a total of 10 visits over a 90 day period. Frequency will decrease as symptoms improve.      Treatment will include: Complete Decongestive Therapy: Manual Therapy, Self-Care/Home Management Training, Therapeutic Exercise, Neuromuscular Re-education, Orthotic Management and Training        Charges: MM3    Total Timed Treatment: 45 min  Total Treatment Time: 45 min

## 2024-11-13 ENCOUNTER — OFFICE VISIT (OUTPATIENT)
Dept: PHYSICAL THERAPY | Facility: HOSPITAL | Age: 64
End: 2024-11-13
Attending: INTERNAL MEDICINE
Payer: COMMERCIAL

## 2024-11-13 PROCEDURE — 97140 MANUAL THERAPY 1/> REGIONS: CPT | Performed by: PHYSICAL THERAPIST

## 2024-11-13 NOTE — PROGRESS NOTES
Referring Provider:  Betty  Diagnosis: Lymphedema of breast (I89.0)      Date of onset: March 2024 Evaluation Date: 11/5/2024     Physical Therapy Lymphedema Daily Note         Precautions:  Breast cancer, HTN  Education or treatment limitation: None  Rehab Potential:good          Past Medical History:    Anxiety state    Breast cancer (HCC)    Depression    High blood pressure    High cholesterol    Osteoarthritis    Other screening mammogram    Routine Papanicolaou smear    Visual impairment    reading glasses         Pain:  1-2/10 L axilla       Subjective:  \"It is softer now\"    Objective:  11/11/2024:  Swelling L breast and trunk  L breast + pitting, hyperplasia, Hyperpigmentation,  \"Pimple\" inferior left breast/along bra band area      11/5/2024 (Evaluation):  Sensation:  numbness L axilla     AROM/Strength:  B shld AROM WFL but w/ reports of tightness on left, strength L shld 4+/5, R 4/5     Posture: good        Edema/Tissue Observations:     - swelling L breast, + pitting, hyperplasia, Hyperpigmentation,  - swelling L trunk  - decreased scar mobility (L medial areola), tissue puckering   - no visual swelling of LUE but upper arm is larger than RUE (pt is L arm dominant)      Trunk Measurement:   - 12 cm inf to sternal notch: 116.8  - 18 cm inf to sternal notch: 118.8   - 20 cm inf to sternal notch:  120.2         Stemmer's Sign: negative      Arm Volume Measurements:        11/5/2024     8:00 AM   Lymphedema Calculations   DATE MEASURED 11/5/2024   LOCATION/MEASUREMENTS LUE   PATIENT POSITION  supine   LIMB POSITION 75 degrees abd   STARTING POINT 18 cm from 3rd cuticl;e   RIGHT HAND VOLUME 20.3 across palm   LEFT HAND VOLUME 20.1 across palm   MEASUREMENT A 17.7   MEASUREMENT B 20.8   MEASUREMENT C 23.8   MEASUREMENT D 27.3   MEASUREMENT E 29.4   MEASUREMENT F 31.8   MEASUREMENT G 34.5   MEASUREMENT H 39.8   MEASUREMENT I 45.1   MEASUREMENT J 49.1    TOTAL VOLUME  [id number]   DATE MEASURED 11/5/2024    LOCATION/MEASUREMENTS RUE   MEASUREMENT A 17.7   MEASUREMENT B 20.2   MEASUREMENT C 23.6   MEASUREMENT D 28   MEASUREMENT E 30.5   MEASUREMENT F 31.8   MEASUREMENT G 34.7   MEASUREMENT H 37.8   MEASUREMENT I 42.3   MEASUREMENT J 47.6    TOTAL VOLUME  3733.78558   % DIFFERENCE 4.43224                           Lymphedema Life Impact Scale: Evaluation Score 11/5/2024: LLIS Score Evaluation: 4.5 % impaired. (0% is no impairment, 100% total impairment)         Today’s Treatment and Response:   Date 11/5/2024 Date: 11/7/2024 Date: 11/11/2024 Date: 11/13/2024 Date: * Date: *   Visit # 1/10  Certification From: 11/5/2024  To:2/3/2025     Visit # 2/10  Certification From: 11/5/2024  To:2/3/2025  Visit: #3/10  Certification From: 11/5/2024  To:2/3/2025 Visit: #4/10  Certification From: 11/5/2024  To:2/3/2025  Visit: #5/* Visit: #6/*   Manual Therapy (40 minutes)     Scar massage     MLD: neck sequence, abd sequence R axilla, A-A, L inguinal, L A-I, L axilla, L breast, L UE. Increased time on left breast.         Compression:  - pt to wear her usual bra she wears at next session to assess if add'l compression needed  - will monitor LUE if compression sleeve would be beneficial        Manual therapy (30 minutes)      Scar massage     MLD: neck sequence, abd sequence R axilla, A-A, L inguinal, L A-I, L axilla, L breast, L UE. Increased time on left breast.     Compression:  -assessed bra  -recommended bra with better side trunk coverage (ie, Priarie Wear Theresa)  Manual Therapy (45 min)    Scar massage     MLD: neck sequence, abd sequence R axilla, A-A, L inguinal, L A-I, L axilla, L breast, L UE. Increased time on left breast.       Over-pressure on \"pimple\" area, firm whitish substance expelled, no bleeding or drainage, applied Antibacterial ointment over    Compression:  - fabricated foam compression pad for L trunk  - pt looking into new bras (has sports bra she will bring in to see if will be good) Manual Therapy (45 min)        Scar massage. Encouraged aquaphor on nipple at night (due to dryness)      MLD: neck sequence, abd sequence R axilla, A-A, L inguinal, L A-I, L axilla, L breast, L UE. Increased time on left breast, deep technique area of fibrosis    Discussed self MLD 1-2 times a day    Compression:  - pt wearing new bra, better fit. Adjusted straps to improve the fit. Discussed using foam compression pad over medial/inferior breast for add'l compression        There Ex (  minutes):        There Ex ()           ThereAct (10 min):         Self-Care:  Management/Education: Explained Lymphedema diagnosis; informed patient of lymphedema precautions and risk reduction practices, and importance of skin care.   There Act (15 minutes)    -demo self breast massage.  Hand placement and pressure  -reinforced importance of daily breast massage as well as applying lotion especially to the radiated L breast.  -trial several different types of bras.  Hyde jorge appears to have good coverage.  Pt tried on XL but may be better fit in XXL.    -info given on store in [place] ([employer]) if she wants to try on bra or on-line ordering with promo code.                  Assessment: Swelling decreasing, tissue softening          Goals (discussed and planned with patient involvement):      To be met in 10 visits:  1) Decrease swelling LUE to be less than 2 % greater than unaffected arm to allow patient to be fitted for compression garments if needed   2) Decrease swelling L breast and trunk by a total of 3 cm with improved tissue mobilibty to decrease risks of infection  3) Improve L breast scar mobility to WFL to improve visual appearance of breast  4) Pt to be independent with self MLD, ROM exercises, and donning/doffing compression bandages/garments to facilitate swelling reduction and to be independent at self management once discharged           PLAN:  Frequency / Duration: Patient will be seen for 1-2 x/week or a total of 10 visits  over a 90 day period. Frequency will decrease as symptoms improve.      Treatment will include: Complete Decongestive Therapy: Manual Therapy, Self-Care/Home Management Training, Therapeutic Exercise, Neuromuscular Re-education, Orthotic Management and Training        Charges: MM3   Total Timed Treatment: 45 min Total Treatment Time: 45 min

## 2024-11-18 ENCOUNTER — OFFICE VISIT (OUTPATIENT)
Dept: PHYSICAL THERAPY | Facility: HOSPITAL | Age: 64
End: 2024-11-18
Attending: INTERNAL MEDICINE
Payer: COMMERCIAL

## 2024-11-18 PROCEDURE — 97140 MANUAL THERAPY 1/> REGIONS: CPT | Performed by: PHYSICAL THERAPIST

## 2024-11-18 NOTE — PROGRESS NOTES
Referring Provider:  Betty  Diagnosis: Lymphedema of breast (I89.0)      Date of onset: March 2024 Evaluation Date: 11/5/2024     Physical Therapy Lymphedema Daily Note         Precautions:  Breast cancer, HTN  Education or treatment limitation: None  Rehab Potential:good          Past Medical History:    Anxiety state    Breast cancer (HCC)    Depression    High blood pressure    High cholesterol    Osteoarthritis    Other screening mammogram    Routine Papanicolaou smear    Visual impairment    reading glasses         Pain:  1/10 L axilla   \"it's not really pain\"     Subjective:  \"I think it's much better\"     Objective:    11/18/2024:  Swelling L breast, mostly medially/inferiorly (+ pitting, hyerplasia)  Decreased L breast scar mobility  Trunk Measurement:   - 12 cm inf to sternal notch:114.6 (.8 cm)  - 18 cm inf to sternal notch: 118 (.8  cm)  - 20 cm inf to sternal notch: 118.8 (IE  120.2 cm)       11/11/2024:  Swelling L breast and trunk  L breast + pitting, hyperplasia, Hyperpigmentation,  \"Pimple\" inferior left breast/along bra band area      11/5/2024 (Evaluation):  Sensation:  numbness L axilla     AROM/Strength:  B shld AROM WFL but w/ reports of tightness on left, strength L shld 4+/5, R 4/5     Posture: good        Edema/Tissue Observations:     - swelling L breast, + pitting, hyperplasia, Hyperpigmentation,  - swelling L trunk  - decreased scar mobility (L medial areola), tissue puckering   - no visual swelling of LUE but upper arm is larger than RUE (pt is L arm dominant)      Trunk Measurement:   - 12 cm inf to sternal notch: 116.8  - 18 cm inf to sternal notch: 118.8   - 20 cm inf to sternal notch:  120.2         Stemmer's Sign: negative      Arm Volume Measurements:        11/5/2024     8:00 AM   Lymphedema Calculations   DATE MEASURED 11/5/2024   LOCATION/MEASUREMENTS LUE   PATIENT POSITION  supine   LIMB POSITION 75 degrees abd   STARTING POINT 18 cm from 3rd cuticl;e   RIGHT  HAND VOLUME 20.3 across palm   LEFT HAND VOLUME 20.1 across palm   MEASUREMENT A 17.7   MEASUREMENT B 20.8   MEASUREMENT C 23.8   MEASUREMENT D 27.3   MEASUREMENT E 29.4   MEASUREMENT F 31.8   MEASUREMENT G 34.5   MEASUREMENT H 39.8   MEASUREMENT I 45.1   MEASUREMENT J 49.1    TOTAL VOLUME  [id number]   DATE MEASURED 11/5/2024   LOCATION/MEASUREMENTS RUE   MEASUREMENT A 17.7   MEASUREMENT B 20.2   MEASUREMENT C 23.6   MEASUREMENT D 28   MEASUREMENT E 30.5   MEASUREMENT F 31.8   MEASUREMENT G 34.7   MEASUREMENT H 37.8   MEASUREMENT I 42.3   MEASUREMENT J 47.6    TOTAL VOLUME  3733.39932   % DIFFERENCE 4.09144                           Lymphedema Life Impact Scale: Evaluation Score 11/5/2024: LLIS Score Evaluation: 4.5 % impaired. (0% is no impairment, 100% total impairment)         Today’s Treatment and Response:   Date 11/5/2024 Date: 11/7/2024 Date: 11/11/2024 Date: 11/13/2024 Date: 11/18/2024 Date: *   Visit # 1/10  Certification From: 11/5/2024  To:2/3/2025     Visit # 2/10  Certification From: 11/5/2024  To:2/3/2025  Visit: #3/10  Certification From: 11/5/2024  To:2/3/2025 Visit: #4/10  Certification From: 11/5/2024  To:2/3/2025  Visit: #5/10  Certification From: 11/5/2024  To:2/3/2025  Visit: #6/*   Manual Therapy (40 minutes)     Scar massage     MLD: neck sequence, abd sequence R axilla, A-A, L inguinal, L A-I, L axilla, L breast, L UE. Increased time on left breast.         Compression:  - pt to wear her usual bra she wears at next session to assess if add'l compression needed  - will monitor LUE if compression sleeve would be beneficial        Manual therapy (30 minutes)      Scar massage     MLD: neck sequence, abd sequence R axilla, A-A, L inguinal, L A-I, L axilla, L breast, L UE. Increased time on left breast.     Compression:  -assessed bra  -recommended bra with better side trunk coverage (ie, [employer] [place])  Manual Therapy (45 min)    Scar massage     MLD: neck sequence, abd sequence R axilla,  A-A, L inguinal, L A-I, L axilla, L breast, L UE. Increased time on left breast.       Over-pressure on \"pimple\" area, firm whitish substance expelled, no bleeding or drainage, applied Antibacterial ointment over    Compression:  - fabricated foam compression pad for L trunk  - pt looking into new bras (has sports bra she will bring in to see if will be good) Manual Therapy (45 min)       Scar massage. Encouraged aquaphor on nipple at night (due to dryness)      MLD: neck sequence, abd sequence R axilla, A-A, L inguinal, L A-I, L axilla, L breast, L UE. Increased time on left breast, deep technique area of fibrosis    Discussed self MLD 1-2 times a day    Compression:  - pt wearing new bra, better fit. Adjusted straps to improve the fit. Discussed using foam compression pad over medial/inferior breast for add'l compression   Manual therapy (45 min)    Scar massage     MLD: neck sequence, abd sequence R axilla, A-A, L inguinal, L A-I, L axilla, L breast, L UE. Increased time on left medial/inf breast.      Compression:  - foam compression pad fabricated to medial/inferior breast  * measure arm volume   There Ex (  minutes):        There Ex ()           ThereAct (10 min):         Self-Care:  Management/Education: Explained Lymphedema diagnosis; informed patient of lymphedema precautions and risk reduction practices, and importance of skin care.   There Act (15 minutes)    -demo self breast massage.  Hand placement and pressure  -reinforced importance of daily breast massage as well as applying lotion especially to the radiated L breast.  -trial several different types of bras.  [place] appears to have good coverage.  Pt tried on XL but may be better fit in XXL.    -info given on store in [place] ([employer]) if she wants to try on bra or on-line ordering with promo code.                  Assessment: Good reduction of breast swelling noted with improved tissue. Swelling remains medially/inferiorly. Will  measure arm volume next visit to assess if compression sleeve would be beneficial         Goals (discussed and planned with patient involvement):      To be met in 10 visits:  1) Decrease swelling LUE to be less than 2 % greater than unaffected arm to allow patient to be fitted for compression garments if needed   2) Decrease swelling L breast and trunk by a total of 3 cm with improved tissue mobilibty to decrease risks of infection- MET  3) Improve L breast scar mobility to WFL to improve visual appearance of breast  4) Pt to be independent with self MLD, ROM exercises, and donning/doffing compression bandages/garments to facilitate swelling reduction and to be independent at self management once discharged           PLAN:  Frequency / Duration: Patient will be seen for 1-2 x/week or a total of 10 visits over a 90 day period. Frequency will decrease as symptoms improve.      Treatment will include: Complete Decongestive Therapy: Manual Therapy, Self-Care/Home Management Training, Therapeutic Exercise, Neuromuscular Re-education, Orthotic Management and Training        Charges: MM3   Total Timed Treatment: 45 min Total Treatment Time: 45 min

## 2024-11-20 ENCOUNTER — OFFICE VISIT (OUTPATIENT)
Dept: PHYSICAL THERAPY | Facility: HOSPITAL | Age: 64
End: 2024-11-20
Attending: INTERNAL MEDICINE
Payer: COMMERCIAL

## 2024-11-20 PROCEDURE — 97140 MANUAL THERAPY 1/> REGIONS: CPT

## 2024-11-20 NOTE — PROGRESS NOTES
Referring Provider:  Betty  Diagnosis: Lymphedema of breast (I89.0)      Date of onset: March 2024 Evaluation Date: 11/5/2024     Physical Therapy Lymphedema Daily Note         Precautions:  Breast cancer, HTN  Education or treatment limitation: None  Rehab Potential:good          Past Medical History:    Anxiety state    Breast cancer (HCC)    Depression    High blood pressure    High cholesterol    Osteoarthritis    Other screening mammogram    Routine Papanicolaou smear    Visual impairment    reading glasses         Pain:  1/10 L axilla   \"it's not really pain\"     Subjective:  Pt states that she is wearing the new foam compression pad that was fabricated at last session.  \"I think it is helping.\"    Objective:    11/18/2024:  Swelling L breast, mostly medially/inferiorly (+ pitting, hyerplasia)  Decreased L breast scar mobility  Trunk Measurement:   - 12 cm inf to sternal notch:114.6 (.8 cm)  - 18 cm inf to sternal notch: 118 (.8  cm)  - 20 cm inf to sternal notch: 118.8 (IE  120.2 cm)       11/11/2024:  Swelling L breast and trunk  L breast + pitting, hyperplasia, Hyperpigmentation,  \"Pimple\" inferior left breast/along bra band area      11/5/2024 (Evaluation):  Sensation:  numbness L axilla     AROM/Strength:  B shld AROM WFL but w/ reports of tightness on left, strength L shld 4+/5, R 4/5     Posture: good        Edema/Tissue Observations:     - swelling L breast, + pitting, hyperplasia, Hyperpigmentation,  - swelling L trunk  - decreased scar mobility (L medial areola), tissue puckering   - no visual swelling of LUE but upper arm is larger than RUE (pt is L arm dominant)      Trunk Measurement:   - 12 cm inf to sternal notch: 116.8  - 18 cm inf to sternal notch: 118.8   - 20 cm inf to sternal notch:  120.2         Stemmer's Sign: negative      Arm Volume Measurements:        11/5/2024     8:00 AM   Lymphedema Calculations   DATE MEASURED 11/5/2024   LOCATION/MEASUREMENTS LUE   PATIENT  POSITION  supine   LIMB POSITION 75 degrees abd   STARTING POINT 18 cm from 3rd cuticl;e   RIGHT HAND VOLUME 20.3 across palm   LEFT HAND VOLUME 20.1 across palm   MEASUREMENT A 17.7   MEASUREMENT B 20.8   MEASUREMENT C 23.8   MEASUREMENT D 27.3   MEASUREMENT E 29.4   MEASUREMENT F 31.8   MEASUREMENT G 34.5   MEASUREMENT H 39.8   MEASUREMENT I 45.1   MEASUREMENT J 49.1    TOTAL VOLUME  3887.4451   DATE MEASURED 11/5/2024   LOCATION/MEASUREMENTS RUE   MEASUREMENT A 17.7   MEASUREMENT B 20.2   MEASUREMENT C 23.6   MEASUREMENT D 28   MEASUREMENT E 30.5   MEASUREMENT F 31.8   MEASUREMENT G 34.7   MEASUREMENT H 37.8   MEASUREMENT I 42.3   MEASUREMENT J 47.6    TOTAL VOLUME  3733.28689   % DIFFERENCE 4.54048                           Lymphedema Life Impact Scale: Evaluation Score 11/5/2024: LLIS Score Evaluation: 4.5 % impaired. (0% is no impairment, 100% total impairment)         Today’s Treatment and Response:   Date: 11/13/2024 Date: 11/18/2024 Date: 11/20/2024    Visit: #4/10  Certification From: 11/5/2024  To:2/3/2025  Visit: #5/10  Certification From: 11/5/2024  To:2/3/2025  Visit: #6/10  Certification From: 11/5/2024  To:2/3/2025     Manual Therapy (45 min)       Scar massage. Encouraged aquaphor on nipple at night (due to dryness)      MLD: neck sequence, abd sequence R axilla, A-A, L inguinal, L A-I, L axilla, L breast, L UE. Increased time on left breast, deep technique area of fibrosis    Discussed self MLD 1-2 times a day    Compression:  - pt wearing new bra, better fit. Adjusted straps to improve the fit. Discussed using foam compression pad over medial/inferior breast for add'l compression   Manual therapy (45 min)    Scar massage     MLD: neck sequence, abd sequence R axilla, A-A, L inguinal, L A-I, L axilla, L breast, L UE. Increased time on left medial/inf breast.      Compression:  - foam compression pad fabricated to medial/inferior breast  Manual therapy (43 min)    Scar massage       MLD: neck  sequence, abd sequence R axilla, A-A, L inguinal, L A-I, L axilla, L breast, L UE. Increased time on left medial/inf breast.     * measure arm volume                            Assessment: Foam pad helping L medial breast swelling.         Goals (discussed and planned with patient involvement):      To be met in 10 visits:  1) Decrease swelling LUE to be less than 2 % greater than unaffected arm to allow patient to be fitted for compression garments if needed   2) Decrease swelling L breast and trunk by a total of 3 cm with improved tissue mobilibty to decrease risks of infection- MET  3) Improve L breast scar mobility to WFL to improve visual appearance of breast  4) Pt to be independent with self MLD, ROM exercises, and donning/doffing compression bandages/garments to facilitate swelling reduction and to be independent at self management once discharged           PLAN:  Frequency / Duration: Patient will be seen for 1-2 x/week or a total of 10 visits over a 90 day period. Frequency will decrease as symptoms improve.      Treatment will include: Complete Decongestive Therapy: Manual Therapy, Self-Care/Home Management Training, Therapeutic Exercise, Neuromuscular Re-education, Orthotic Management and Training        Charges: MM3   Total Timed Treatment: 43 min Total Treatment Time: 45 min

## 2024-12-02 ENCOUNTER — OFFICE VISIT (OUTPATIENT)
Dept: PHYSICAL THERAPY | Facility: HOSPITAL | Age: 64
End: 2024-12-02
Attending: INTERNAL MEDICINE
Payer: COMMERCIAL

## 2024-12-02 PROCEDURE — 97140 MANUAL THERAPY 1/> REGIONS: CPT | Performed by: PHYSICAL THERAPIST

## 2024-12-02 NOTE — PROGRESS NOTES
Referring Provider:  Betty  Diagnosis: Lymphedema of breast (I89.0)      Date of onset: March 2024 Evaluation Date: 11/5/2024     Physical Therapy Lymphedema Daily Note         Precautions:  Breast cancer, HTN  Education or treatment limitation: None  Rehab Potential:good          Past Medical History:    Anxiety state    Breast cancer (HCC)    Depression    High blood pressure    High cholesterol    Osteoarthritis    Other screening mammogram    Routine Papanicolaou smear    Visual impairment    reading glasses         Pain:  no pain    Subjective:  \"I haven't been able to wear the compression pad in my bra every day\"     Objective:  12/2/2024:  Swelling L breast, especially medially (+pitting and hyperplasia)  No signs/symptoms of arm swelling     Trunk Measurement:   - 12 cm inf to sternal notch:113.9 (.8 cm)  - 18 cm inf to sternal notch: 118 (.8  cm)  - 20 cm inf to sternal notch: 119 (IE  120.2 cm)         12/2/2024    10:00 AM 11/5/2024     8:00 AM   Lymphedema Calculations   DATE MEASURED 12/2/2024 11/5/2024   LOCATION/MEASUREMENTS [facility]   PATIENT POSITION  supine supine   LIMB POSITION 75 degrees abd 75 degrees abd   STARTING POINT 18 cm from 3rd cuticl;e 18 cm from 3rd cuticl;e   RIGHT HAND VOLUME 20.3 across palm 20.3 across palm   LEFT HAND VOLUME 20.1 across palm 20.1 across palm   MEASUREMENT A 17.4 17.7   MEASUREMENT B 20.3 20.8   MEASUREMENT C 23.8 23.8   MEASUREMENT D 27.4 27.3   MEASUREMENT E 29.2 29.4   MEASUREMENT F 30.7 31.8   MEASUREMENT G 34.5 34.5   MEASUREMENT H 39.6 39.8   MEASUREMENT I 43.5 45.1   MEASUREMENT J 48.1 49.1    TOTAL VOLUME  [id number] 3887.4451   DATE MEASURED 12/2/2024 11/5/2024   LOCATION/MEASUREMENTS [facility]   MEASUREMENT A 17.5 17.7   MEASUREMENT B 20.3 20.2   MEASUREMENT C 23.8 23.6   MEASUREMENT D 27.8 28   MEASUREMENT E 30.4 30.5   MEASUREMENT F 31.1 31.8   MEASUREMENT G 34.1 34.7   MEASUREMENT H 37.7 37.8   MEASUREMENT I 42.2 42.3   MEASUREMENT J  47.3 47.6    TOTAL VOLUME  3684.01347 3733.40473   % DIFFERENCE 2.54645 4.00247                 11/18/2024:  Swelling L breast, mostly medially/inferiorly (+ pitting, hyerplasia)  Decreased L breast scar mobility  Trunk Measurement:   - 12 cm inf to sternal notch:114.6 (.8 cm)  - 18 cm inf to sternal notch: 118 (.8  cm)  - 20 cm inf to sternal notch: 118.8 (IE  120.2 cm)       11/11/2024:  Swelling L breast and trunk  L breast + pitting, hyperplasia, Hyperpigmentation,  \"Pimple\" inferior left breast/along bra band area      11/5/2024 (Evaluation):  Sensation:  numbness L axilla     AROM/Strength:  B shld AROM WFL but w/ reports of tightness on left, strength L shld 4+/5, R 4/5     Posture: good        Edema/Tissue Observations:     - swelling L breast, + pitting, hyperplasia, Hyperpigmentation,  - swelling L trunk  - decreased scar mobility (L medial areola), tissue puckering   - no visual swelling of LUE but upper arm is larger than RUE (pt is L arm dominant)      Trunk Measurement:   - 12 cm inf to sternal notch: 116.8  - 18 cm inf to sternal notch: 118.8   - 20 cm inf to sternal notch:  120.2         Stemmer's Sign: negative      Arm Volume Measurements:        11/5/2024     8:00 AM   Lymphedema Calculations   DATE MEASURED 11/5/2024   LOCATION/MEASUREMENTS LUE   PATIENT POSITION  supine   LIMB POSITION 75 degrees abd   STARTING POINT 18 cm from 3rd cuticl;e   RIGHT HAND VOLUME 20.3 across palm   LEFT HAND VOLUME 20.1 across palm   MEASUREMENT A 17.7   MEASUREMENT B 20.8   MEASUREMENT C 23.8   MEASUREMENT D 27.3   MEASUREMENT E 29.4   MEASUREMENT F 31.8   MEASUREMENT G 34.5   MEASUREMENT H 39.8   MEASUREMENT I 45.1   MEASUREMENT J 49.1    TOTAL VOLUME  3887.4451   DATE MEASURED 11/5/2024   LOCATION/MEASUREMENTS RUE   MEASUREMENT A 17.7   MEASUREMENT B 20.2   MEASUREMENT C 23.6   MEASUREMENT D 28   MEASUREMENT E 30.5   MEASUREMENT F 31.8   MEASUREMENT G 34.7   MEASUREMENT H 37.8   MEASUREMENT I 42.3    MEASUREMENT J 47.6    TOTAL VOLUME  3733.04429   % DIFFERENCE 4.24765                           Lymphedema Life Impact Scale: Evaluation Score 11/5/2024: LLIS Score Evaluation: 4.5 % impaired. (0% is no impairment, 100% total impairment)         Today’s Treatment and Response:   Date: 11/13/2024 Date: 11/18/2024 Date: 11/20/2024 12/2/2024   Visit: #4/10  Certification From: 11/5/2024  To:2/3/2025  Visit: #5/10  Certification From: 11/5/2024  To:2/3/2025  Visit: #6/10  Certification From: 11/5/2024  To:2/3/2025  Visit: #7/10  Certification From: 11/5/2024  To:2/3/2025   Manual Therapy (45 min)       Scar massage. Encouraged aquaphor on nipple at night (due to dryness)      MLD: neck sequence, abd sequence R axilla, A-A, L inguinal, L A-I, L axilla, L breast, L UE. Increased time on left breast, deep technique area of fibrosis    Discussed self MLD 1-2 times a day    Compression:  - pt wearing new bra, better fit. Adjusted straps to improve the fit. Discussed using foam compression pad over medial/inferior breast for add'l compression   Manual therapy (45 min)    Scar massage     MLD: neck sequence, abd sequence R axilla, A-A, L inguinal, L A-I, L axilla, L breast, L UE. Increased time on left medial/inf breast.      Compression:  - foam compression pad fabricated to medial/inferior breast  Manual therapy (43 min)    Scar massage       MLD: neck sequence, abd sequence R axilla, A-A, L inguinal, L A-I, L axilla, L breast, L UE. Increased time on left medial/inf breast.     Manual Therapy (45 min)  measured arm volume and trunk circumference    Scar massage       MLD: neck sequence, abd sequence R axilla, A-A, L inguinal, L A-I, L axilla, L breast, L UE. Increased time on left medial/inf breast.      Instr in self MLD (verbal and written)    Compression:  - encouraged to cont w/ breast compression  - discussed since arm volume decreased without compression, defer compression garments for the arm                               Assessment: Slight increase left breast swelling. L arm volume has decreased. Scar mobility improves after treatment          Goals (discussed and planned with patient involvement):      To be met in 10 visits:  1) Decrease swelling LUE to be less than 2 % greater than unaffected arm to allow patient to be fitted for compression garments if needed - almost met  2) Decrease swelling L breast and trunk by a total of 3 cm with improved tissue mobilibty to decrease risks of infection- MET  3) Improve L breast scar mobility to WFL to improve visual appearance of breast          4) Pt to be independent with self MLD, ROM exercises, and donning/doffing compression bandages/garments to facilitate swelling reduction and to be independent at self management once discharged           PLAN:       Frequency / Duration: Patient will be seen for 1-2 x/week or a total of 10 visits over a 90 day period. Frequency will decrease as symptoms improve.      Treatment will include: Complete Decongestive Therapy: Manual Therapy, Self-Care/Home Management Training, Therapeutic Exercise, Neuromuscular Re-education, Orthotic Management and Training        Charges: MM3   Total Timed Treatment: 45 min Total Treatment Time: 45 min

## 2024-12-04 ENCOUNTER — OFFICE VISIT (OUTPATIENT)
Dept: PHYSICAL THERAPY | Facility: HOSPITAL | Age: 64
End: 2024-12-04
Attending: INTERNAL MEDICINE
Payer: COMMERCIAL

## 2024-12-04 PROCEDURE — 97140 MANUAL THERAPY 1/> REGIONS: CPT | Performed by: PHYSICAL THERAPIST

## 2024-12-04 NOTE — PROGRESS NOTES
Referring Provider:  Betty  Diagnosis: Lymphedema of breast (I89.0)      Date of onset: March 2024 Evaluation Date: 11/5/2024     Physical Therapy Lymphedema Daily Note         Precautions:  Breast cancer, HTN  Education or treatment limitation: None  Rehab Potential:good          Past Medical History:    Anxiety state    Breast cancer (HCC)    Depression    High blood pressure    High cholesterol    Osteoarthritis    Other screening mammogram    Routine Papanicolaou smear    Visual impairment    reading glasses         Pain:  no pain    Subjective:  \"I have been trying to massage it myself\"    Objective:  12/2/2024:  Swelling L breast, especially medially (+pitting and hyperplasia)  No signs/symptoms of arm swelling     Trunk Measurement:   - 12 cm inf to sternal notch:113.9 (.8 cm)  - 18 cm inf to sternal notch: 118 (.8  cm)  - 20 cm inf to sternal notch: 119 (IE  120.2 cm)         12/2/2024    10:00 AM 11/5/2024     8:00 AM   Lymphedema Calculations   DATE MEASURED 12/2/2024 11/5/2024   LOCATION/MEASUREMENTS [facility]   PATIENT POSITION  supine supine   LIMB POSITION 75 degrees abd 75 degrees abd   STARTING POINT 18 cm from 3rd cuticl;e 18 cm from 3rd cuticl;e   RIGHT HAND VOLUME 20.3 across palm 20.3 across palm   LEFT HAND VOLUME 20.1 across palm 20.1 across palm   MEASUREMENT A 17.4 17.7   MEASUREMENT B 20.3 20.8   MEASUREMENT C 23.8 23.8   MEASUREMENT D 27.4 27.3   MEASUREMENT E 29.2 29.4   MEASUREMENT F 30.7 31.8   MEASUREMENT G 34.5 34.5   MEASUREMENT H 39.6 39.8   MEASUREMENT I 43.5 45.1   MEASUREMENT J 48.1 49.1    TOTAL VOLUME  3765.46585 3887.4451   DATE MEASURED 12/2/2024 11/5/2024   LOCATION/MEASUREMENTS [facility]   MEASUREMENT A 17.5 17.7   MEASUREMENT B 20.3 20.2   MEASUREMENT C 23.8 23.6   MEASUREMENT D 27.8 28   MEASUREMENT E 30.4 30.5   MEASUREMENT F 31.1 31.8   MEASUREMENT G 34.1 34.7   MEASUREMENT H 37.7 37.8   MEASUREMENT I 42.2 42.3   MEASUREMENT J 47.3 47.6    TOTAL VOLUME   3684.84047 3733.05431   % DIFFERENCE 2.48325 4.50325                 11/18/2024:  Swelling L breast, mostly medially/inferiorly (+ pitting, hyerplasia)  Decreased L breast scar mobility  Trunk Measurement:   - 12 cm inf to sternal notch:114.6 (.8 cm)  - 18 cm inf to sternal notch: 118 (.8  cm)  - 20 cm inf to sternal notch: 118.8 (IE  120.2 cm)       11/11/2024:  Swelling L breast and trunk  L breast + pitting, hyperplasia, Hyperpigmentation,  \"Pimple\" inferior left breast/along bra band area      11/5/2024 (Evaluation):  Sensation:  numbness L axilla     AROM/Strength:  B shld AROM WFL but w/ reports of tightness on left, strength L shld 4+/5, R 4/5     Posture: good        Edema/Tissue Observations:     - swelling L breast, + pitting, hyperplasia, Hyperpigmentation,  - swelling L trunk  - decreased scar mobility (L medial areola), tissue puckering   - no visual swelling of LUE but upper arm is larger than RUE (pt is L arm dominant)      Trunk Measurement:   - 12 cm inf to sternal notch: 116.8  - 18 cm inf to sternal notch: 118.8   - 20 cm inf to sternal notch:  120.2         Stemmer's Sign: negative      Arm Volume Measurements:        11/5/2024     8:00 AM   Lymphedema Calculations   DATE MEASURED 11/5/2024   LOCATION/MEASUREMENTS [facility]   PATIENT POSITION  supine   LIMB POSITION 75 degrees abd   STARTING POINT 18 cm from 3rd cuticl;e   RIGHT HAND VOLUME 20.3 across palm   LEFT HAND VOLUME 20.1 across palm   MEASUREMENT A 17.7   MEASUREMENT B 20.8   MEASUREMENT C 23.8   MEASUREMENT D 27.3   MEASUREMENT E 29.4   MEASUREMENT F 31.8   MEASUREMENT G 34.5   MEASUREMENT H 39.8   MEASUREMENT I 45.1   MEASUREMENT J 49.1    TOTAL VOLUME  3887.4451   DATE MEASURED 11/5/2024   LOCATION/MEASUREMENTS [facility]   MEASUREMENT A 17.7   MEASUREMENT B 20.2   MEASUREMENT C 23.6   MEASUREMENT D 28   MEASUREMENT E 30.5   MEASUREMENT F 31.8   MEASUREMENT G 34.7   MEASUREMENT H 37.8   MEASUREMENT I 42.3   MEASUREMENT J 47.6    TOTAL  VOLUME  3733.31629   % DIFFERENCE 4.84546                           Lymphedema Life Impact Scale: Evaluation Score 11/5/2024: LLIS Score Evaluation: 4.5 % impaired. (0% is no impairment, 100% total impairment)         Today’s Treatment and Response:   Date: 11/13/2024 Date: 11/18/2024 Date: 11/20/2024 12/2/2024 12/4/2024   Visit: #4/10  Certification From: 11/5/2024  To:2/3/2025  Visit: #5/10  Certification From: 11/5/2024  To:2/3/2025  Visit: #6/10  Certification From: 11/5/2024  To:2/3/2025  Visit: #7/10  Certification From: 11/5/2024  To:2/3/2025 Visit: #8/10  Certification From: 11/5/2024  To:2/3/2025   Manual Therapy (45 min)       Scar massage. Encouraged aquaphor on nipple at night (due to dryness)      MLD: neck sequence, abd sequence R axilla, A-A, L inguinal, L A-I, L axilla, L breast, L UE. Increased time on left breast, deep technique area of fibrosis    Discussed self MLD 1-2 times a day    Compression:  - pt wearing new bra, better fit. Adjusted straps to improve the fit. Discussed using foam compression pad over medial/inferior breast for add'l compression   Manual therapy (45 min)    Scar massage     MLD: neck sequence, abd sequence R axilla, A-A, L inguinal, L A-I, L axilla, L breast, L UE. Increased time on left medial/inf breast.      Compression:  - foam compression pad fabricated to medial/inferior breast  Manual therapy (43 min)    Scar massage       MLD: neck sequence, abd sequence R axilla, A-A, L inguinal, L A-I, L axilla, L breast, L UE. Increased time on left medial/inf breast.     Manual Therapy (45 min)  measured arm volume and trunk circumference    Scar massage       MLD: neck sequence, abd sequence R axilla, A-A, L inguinal, L A-I, L axilla, L breast, L UE. Increased time on left medial/inf breast.      Instr in self MLD (verbal and written)    Compression:  - encouraged to cont w/ breast compression  - discussed since arm volume decreased without compression, defer compression  garments for the arm   Manual Therapy (45 min)    Scar massage     MLD: neck sequence, abd sequence R axilla, A-A, L inguinal, L A-I, L axilla, L breast, L UE. Increased time on left medial/inf breast.    Reviewed self MLD and scar massage    Compression:  - pt to cont w/ self MLD and compression bra and pad  - discussed breast swell spot as option                               Assessment: Medial/inferior breasts softens w/ MLD and scar massage           Goals (discussed and planned with patient involvement):      To be met in 10 visits:  1) Decrease swelling LUE to be less than 2 % greater than unaffected arm to allow patient to be fitted for compression garments if needed - almost met  2) Decrease swelling L breast and trunk by a total of 3 cm with improved tissue mobilibty to decrease risks of infection- MET  3) Improve L breast scar mobility to WFL to improve visual appearance of breast    4) Pt to be independent with self MLD, ROM exercises, and donning/doffing compression bandages/garments to facilitate swelling reduction and to be independent at self management once discharged           PLAN:       Frequency / Duration: Patient will be seen for 1-2 x/week or a total of 10 visits over a 90 day period. Frequency will decrease as symptoms improve.      Treatment will include: Complete Decongestive Therapy: Manual Therapy, Self-Care/Home Management Training, Therapeutic Exercise, Neuromuscular Re-education, Orthotic Management and Training        Charges: MM3   Total Timed Treatment: 45  min Total Treatment Time: 45 min

## 2024-12-11 RX ORDER — AMLODIPINE BESYLATE 5 MG/1
7.5 TABLET ORAL DAILY
Qty: 135 TABLET | Refills: 0 | Status: SHIPPED | OUTPATIENT
Start: 2024-12-11

## 2024-12-11 RX ORDER — ATORVASTATIN CALCIUM 10 MG/1
10 TABLET, FILM COATED ORAL NIGHTLY
Qty: 90 TABLET | Refills: 0 | Status: SHIPPED | OUTPATIENT
Start: 2024-12-11

## 2024-12-11 RX ORDER — CITALOPRAM HYDROBROMIDE 20 MG/1
TABLET ORAL
Qty: 90 TABLET | Refills: 0 | Status: SHIPPED | OUTPATIENT
Start: 2024-12-11

## 2024-12-11 NOTE — TELEPHONE ENCOUNTER
Did not pass protocol  Last office visit 9/12/2024  Last refill was: , 9/12/2024  90__tabs  Next appointment: n/a    Please sign pended medication if appropriate     Medication Quantity Refills Start End   citalopram 20 MG Oral Tab 90 tablet 0 9/12/2024 --   Sig:   TAKE 1 TABLET BY MOUTH EVERY DAY     Route:   (none)

## 2024-12-12 RX ORDER — ATORVASTATIN CALCIUM 10 MG/1
10 TABLET, FILM COATED ORAL NIGHTLY
Qty: 90 TABLET | Refills: 0 | OUTPATIENT
Start: 2024-12-12

## 2024-12-14 ENCOUNTER — PATIENT MESSAGE (OUTPATIENT)
Dept: FAMILY MEDICINE CLINIC | Facility: CLINIC | Age: 64
End: 2024-12-14

## 2024-12-16 ENCOUNTER — OFFICE VISIT (OUTPATIENT)
Dept: PHYSICAL THERAPY | Facility: HOSPITAL | Age: 64
End: 2024-12-16
Attending: INTERNAL MEDICINE
Payer: COMMERCIAL

## 2024-12-16 PROCEDURE — 97140 MANUAL THERAPY 1/> REGIONS: CPT | Performed by: PHYSICAL THERAPIST

## 2024-12-16 NOTE — PROGRESS NOTES
Referring Provider:  Betty  Diagnosis: Lymphedema of breast (I89.0)      Date of onset: March 2024 Evaluation Date: 11/5/2024       Discharge Summary    Pt has attended 10 visits in Physical Therapy.       Assessment: Pt w/ good reduction of swelling and improved scar mobility. Slight swelling L breast still noted (mostly medially/inferiorly) but pt is independent w/ self management using compression padding in bra and self MLD.      Plan: Discharge from PT         Lymphedema Life Impact Scale: Score 12/16/2024: LLIS Score Current: 8 % impaired. (0% is no impairment, 100% total impairment)                    Physical Therapy Lymphedema Daily Note         Precautions:  Breast cancer, HTN  Education or treatment limitation: None  Rehab Potential:good          Past Medical History:    Anxiety state    Breast cancer (HCC)    Depression    High blood pressure    High cholesterol    Osteoarthritis    Other screening mammogram    Routine Papanicolaou smear    Visual impairment    reading glasses         Pain:  no pain    Subjective:  \"I've been wearing that compression pad all the time\"      Objective:  12/16/2024:  No visual swelling LUE  Mild swelling L breast (mostly medially/inferiorly)  Trunk Measurement:   - 12 cm inf to sternal notch:113.6 (.8 cm)  - 18 cm inf to sternal notch: 118 (.8  cm)  - 20 cm inf to sternal notch: 118.8 (IE  120.2 cm)     12/2/2024:  Swelling L breast, especially medially (+pitting and hyperplasia)  No signs/symptoms of arm swelling     Trunk Measurement:   - 12 cm inf to sternal notch:113.9 (.8 cm)  - 18 cm inf to sternal notch: 118 (.8  cm)  - 20 cm inf to sternal notch: 119 (IE  120.2 cm)         12/2/2024    10:00 AM 11/5/2024     8:00 AM   Lymphedema Calculations   DATE MEASURED 12/2/2024 11/5/2024   LOCATION/MEASUREMENTS [facility]   PATIENT POSITION  supine supine   LIMB POSITION 75 degrees abd 75 degrees abd   STARTING POINT 18 cm from 3rd cuticl;e 18 cm from  3rd cuticl;e   RIGHT HAND VOLUME 20.3 across palm 20.3 across palm   LEFT HAND VOLUME 20.1 across palm 20.1 across palm   MEASUREMENT A 17.4 17.7   MEASUREMENT B 20.3 20.8   MEASUREMENT C 23.8 23.8   MEASUREMENT D 27.4 27.3   MEASUREMENT E 29.2 29.4   MEASUREMENT F 30.7 31.8   MEASUREMENT G 34.5 34.5   MEASUREMENT H 39.6 39.8   MEASUREMENT I 43.5 45.1   MEASUREMENT J 48.1 49.1    TOTAL VOLUME  3765.51700 3887.4451   DATE MEASURED 12/2/2024 11/5/2024   LOCATION/MEASUREMENTS [facility]   MEASUREMENT A 17.5 17.7   MEASUREMENT B 20.3 20.2   MEASUREMENT C 23.8 23.6   MEASUREMENT D 27.8 28   MEASUREMENT E 30.4 30.5   MEASUREMENT F 31.1 31.8   MEASUREMENT G 34.1 34.7   MEASUREMENT H 37.7 37.8   MEASUREMENT I 42.2 42.3   MEASUREMENT J 47.3 47.6    TOTAL VOLUME  3684.26922 3733.58646   % DIFFERENCE 2.98540 4.84042                 11/18/2024:  Swelling L breast, mostly medially/inferiorly (+ pitting, hyerplasia)  Decreased L breast scar mobility  Trunk Measurement:   - 12 cm inf to sternal notch:114.6 (.8 cm)  - 18 cm inf to sternal notch: 118 (.8  cm)  - 20 cm inf to sternal notch: 118.8 (IE  120.2 cm)       11/11/2024:  Swelling L breast and trunk  L breast + pitting, hyperplasia, Hyperpigmentation,  \"Pimple\" inferior left breast/along bra band area      11/5/2024 (Evaluation):  Sensation:  numbness L axilla     AROM/Strength:  B shld AROM WFL but w/ reports of tightness on left, strength L shld 4+/5, R 4/5     Posture: good        Edema/Tissue Observations:     - swelling L breast, + pitting, hyperplasia, Hyperpigmentation,  - swelling L trunk  - decreased scar mobility (L medial areola), tissue puckering   - no visual swelling of LUE but upper arm is larger than RUE (pt is L arm dominant)      Trunk Measurement:   - 12 cm inf to sternal notch: 116.8  - 18 cm inf to sternal notch: 118.8   - 20 cm inf to sternal notch:  120.2         Stemmer's Sign: negative      Arm Volume Measurements:        11/5/2024     8:00  AM   Lymphedema Calculations   DATE MEASURED 11/5/2024   LOCATION/MEASUREMENTS [facility]   PATIENT POSITION  supine   LIMB POSITION 75 degrees abd   STARTING POINT 18 cm from 3rd cuticl;e   RIGHT HAND VOLUME 20.3 across palm   LEFT HAND VOLUME 20.1 across palm   MEASUREMENT A 17.7   MEASUREMENT B 20.8   MEASUREMENT C 23.8   MEASUREMENT D 27.3   MEASUREMENT E 29.4   MEASUREMENT F 31.8   MEASUREMENT G 34.5   MEASUREMENT H 39.8   MEASUREMENT I 45.1   MEASUREMENT J 49.1    TOTAL VOLUME  3887.4451   DATE MEASURED 11/5/2024   LOCATION/MEASUREMENTS [facility]   MEASUREMENT A 17.7   MEASUREMENT B 20.2   MEASUREMENT C 23.6   MEASUREMENT D 28   MEASUREMENT E 30.5   MEASUREMENT F 31.8   MEASUREMENT G 34.7   MEASUREMENT H 37.8   MEASUREMENT I 42.3   MEASUREMENT J 47.6    TOTAL VOLUME  3733.85326   % DIFFERENCE 4.97966                           Lymphedema Life Impact Scale: Evaluation Score 11/5/2024: LLIS Score Evaluation: 4.5 % impaired. (0% is no impairment, 100% total impairment)         Today’s Treatment and Response:   Date: 11/13/2024 Date: 11/18/2024 Date: 11/20/2024 12/2/2024 12/4/2024 12/16/2024   Visit: #4/10  Certification From: 11/5/2024  To:2/3/2025  Visit: #5/10  Certification From: 11/5/2024  To:2/3/2025  Visit: #6/10  Certification From: 11/5/2024  To:2/3/2025  Visit: #7/10  Certification From: 11/5/2024  To:2/3/2025 Visit: #8/10  Certification From: 11/5/2024  To:2/3/2025 Visit: #9/10  Certification From: 11/5/2024  To:2/3/2025   Manual Therapy (45 min)       Scar massage. Encouraged aquaphor on nipple at night (due to dryness)      MLD: neck sequence, abd sequence R axilla, A-A, L inguinal, L A-I, L axilla, L breast, L UE. Increased time on left breast, deep technique area of fibrosis    Discussed self MLD 1-2 times a day    Compression:  - pt wearing new bra, better fit. Adjusted straps to improve the fit. Discussed using foam compression pad over medial/inferior breast for add'l compression   Manual therapy (45  min)    Scar massage     MLD: neck sequence, abd sequence R axilla, A-A, L inguinal, L A-I, L axilla, L breast, L UE. Increased time on left medial/inf breast.      Compression:  - foam compression pad fabricated to medial/inferior breast  Manual therapy (43 min)    Scar massage       MLD: neck sequence, abd sequence R axilla, A-A, L inguinal, L A-I, L axilla, L breast, L UE. Increased time on left medial/inf breast.     Manual Therapy (45 min)  measured arm volume and trunk circumference    Scar massage       MLD: neck sequence, abd sequence R axilla, A-A, L inguinal, L A-I, L axilla, L breast, L UE. Increased time on left medial/inf breast.      Instr in self MLD (verbal and written)    Compression:  - encouraged to cont w/ breast compression  - discussed since arm volume decreased without compression, defer compression garments for the arm   Manual Therapy (45 min)    Scar massage     MLD: neck sequence, abd sequence R axilla, A-A, L inguinal, L A-I, L axilla, L breast, L UE. Increased time on left medial/inf breast.    Reviewed self MLD and scar massage    Compression:  - pt to cont w/ self MLD and compression bra and pad  - discussed breast swell spot as option  Manual Therapy (45 min)    Measurements    Scar massage      MLD: neck sequence, abd sequence R axilla, A-A, L inguinal, L A-I, L axilla, L breast, L UE. Increased time on left medial/inf breast.    Reviewed self MLD and scar massage    Compression:  - pt to cont wearing compression pad                                  Assessment:  Refer to discharge summary           Goals (discussed and planned with patient involvement):      To be met in 10 visits:  1) Decrease swelling LUE to be less than 2 % greater than unaffected arm to allow patient to be fitted for compression garments if needed -  met  2) Decrease swelling L breast and trunk by a total of 3 cm with improved tissue mobilibty to decrease risks of infection- MET  3) Improve L breast scar mobility  to WFL to improve visual appearance of breast  - mostly met   4) Pt to be independent with self MLD, ROM exercises, and donning/doffing compression bandages/garments to facilitate swelling reduction and to be independent at self management once discharged - MET            PLAN:       Discharge from PT         Charges: MM3    Total Timed Treatment: 45  min Total Treatment Time: 45 min

## 2024-12-17 RX ORDER — LISINOPRIL 20 MG/1
20 TABLET ORAL DAILY
Qty: 90 TABLET | Refills: 0 | Status: SHIPPED | OUTPATIENT
Start: 2024-12-17

## 2024-12-17 RX ORDER — LISINOPRIL AND HYDROCHLOROTHIAZIDE 20; 25 MG/1; MG/1
1 TABLET ORAL DAILY
Qty: 90 TABLET | Refills: 0 | Status: SHIPPED | OUTPATIENT
Start: 2024-12-17

## 2025-01-30 ENCOUNTER — OFFICE VISIT (OUTPATIENT)
Dept: FAMILY MEDICINE CLINIC | Facility: CLINIC | Age: 65
End: 2025-01-30
Payer: COMMERCIAL

## 2025-01-30 VITALS
RESPIRATION RATE: 18 BRPM | WEIGHT: 261 LBS | HEART RATE: 85 BPM | BODY MASS INDEX: 39.56 KG/M2 | SYSTOLIC BLOOD PRESSURE: 132 MMHG | HEIGHT: 68 IN | TEMPERATURE: 98 F | DIASTOLIC BLOOD PRESSURE: 72 MMHG

## 2025-01-30 DIAGNOSIS — R79.89 ELEVATED TSH: ICD-10-CM

## 2025-01-30 DIAGNOSIS — Z00.00 PHYSICAL EXAM: ICD-10-CM

## 2025-01-30 DIAGNOSIS — I10 BENIGN ESSENTIAL HTN: Primary | ICD-10-CM

## 2025-01-30 DIAGNOSIS — F41.1 GENERALIZED ANXIETY DISORDER: ICD-10-CM

## 2025-01-30 DIAGNOSIS — E78.00 HYPERCHOLESTEREMIA: ICD-10-CM

## 2025-01-30 PROCEDURE — 99396 PREV VISIT EST AGE 40-64: CPT | Performed by: FAMILY MEDICINE

## 2025-01-31 NOTE — PROGRESS NOTES
[facility] Family Medicine Office Note  Chief Complaint:   Chief Complaint   Patient presents with    Physical    Medication Follow-Up       HPI:   This is a 64 year old female coming in for physical exam.  The patient states that she has been taking her medications as prescribed she denies any acute concerns today.  She does have a history of hypertension hyperlipidemia depression      Past Medical History:    Anxiety state    Breast cancer (HCC)    Depression    High blood pressure    High cholesterol    Osteoarthritis    Other screening mammogram    Routine Papanicolaou smear    Visual impairment    reading glasses     Past Surgical History:   Procedure Laterality Date          x2    Egd N/A 12/15/2016    Procedure: ESOPHAGOGASTRODUODENOSCOPY, COLONOSCOPY, POSSIBLE BIOPSY, POSSIBLE POLYPECTOMY 95629, 06817;  Surgeon: Ron Lehman MD;  Location: [facility] [place] SURGICAL [facility]    Lumpectomy left  2023    IDC    Vibha localization wire 1 site right (cpt=19281)  2023    Papilloma    Mri breast biopsy w/ clip 1 site left (cpt=19085)  10/2023    papilloma    Mri breast biopsy w/ clip 1 site right (cpt=19085)  10/2023    papilloma    Needle biopsy left Left 2016    fibrocystic changes    Needle biopsy left  2023    US guided biopsy -IDC    Needle biopsy left  10/2023    US guided biopsy -papilloma    Radiation left       Social History:  Social History     Socioeconomic History    Marital status:     Number of children: 4   Tobacco Use    Smoking status: Never    Smokeless tobacco: Never   Vaping Use    Vaping status: Never Used   Substance and Sexual Activity    Alcohol use: Yes     Comment: 2 glasse of wine at night     Drug use: No   Other Topics Concern    Caffeine Concern Yes     Comment: 1 diet Coke a day    Exercise Yes     Comment: 4x week    Social History Narrative    - lives with     4 grown children     Social Drivers of Health     Food Insecurity: No Food  Insecurity (1/30/2025)    NCSS - Food Insecurity     Worried About Running Out of Food in the Last Year: No     Ran Out of Food in the Last Year: No   Transportation Needs: No Transportation Needs (1/30/2025)    NCSS - Transportation     Lack of Transportation: No   Housing Stability: Not At Risk (1/30/2025)    NCSS - Housing/Utilities     Has Housing: Yes     Worried About Losing Housing: No     Unable to Get Utilities: No     Family History:  Family History   Problem Relation Age of Onset    Breast Cancer Self 62    Cancer Mother         lung cancer    Heart Disorder Father         arrhythmia    Breast Cancer Sister 64    Other (Other) Brother         AV malformation     Allergies:  Allergies[1]  Current Meds:  Current Outpatient Medications   Medication Sig Dispense Refill    lisinopril-hydroCHLOROthiazide 20-25 MG Oral Tab Take 1 tablet by mouth daily. 90 tablet 0    lisinopril 20 MG Oral Tab Take 1 tablet (20 mg total) by mouth daily. 90 tablet 0    AMLODIPINE 5 MG Oral Tab TAKE 1.5 TABLETS BY MOUTH DAILY. 135 tablet 0    ATORVASTATIN 10 MG Oral Tab TAKE 1 TABLET BY MOUTH EVERY DAY AT NIGHT 90 tablet 0    CITALOPRAM 20 MG Oral Tab TAKE 1 TABLET BY MOUTH EVERY DAY 90 tablet 0    ALPRAZolam 0.25 MG Oral Tab TAKE 1 TABLET BY MOUTH TWICE DAILY AS NEEDED FOR ANXIETY NO DRIVING AND NO ALCOHOL 30 tablet 1    letrozole 2.5 MG Oral Tab Take 1 tablet (2.5 mg total) by mouth daily. 90 tablet 3    Mometasone Furoate 0.1 % External Cream Apply BID during radiation therapy. 50g is a 2 week supply. 50 g 5    B Complex-C-E-Zn (BEC/ZINC) Oral Tab Take 1 tablet by mouth daily.      aspirin 81 MG Oral Tab EC Take 1 tablet (81 mg total) by mouth daily.      Omega-3 Fatty Acids (FISH OIL BURP-LESS OR) Take by mouth.      Vitamin D3 1000 units Oral Tab Take 1 tablet (1,000 Units total) by mouth daily. Takes 4,000 IU by mouth daily      Multiple Vitamins-Minerals (MULTIVITAMIN OR) Take  by mouth.      Acetaminophen  MG Oral Tab  CR Take 1 tablet (650 mg total) by mouth every 8 (eight) hours as needed for Pain. Two tabs daily (Patient not taking: Reported on 1/30/2025)        Counseling given: Not Answered       REVIEW OF SYSTEMS:   Consitutional: No fevers, chills, sweats  Eye: No recent visual problems  ENMT: No ear pain nasal congestion sore throat  Respiratory: No shortness of breath, cough  Cardiovascular: No chest pain palpitations syncope  Gastrointestinal: No nausea vomiting diarrhea  Genitourinary: No hematuria  Hema/Lymph no bruising tendency, swollen lymph glands  Endocrine: Negative for excessive thirst excessive hunger  Musculoskeletal: No back pain neck pain joint pain muscle pain decreased range of motion  Integumentary: No rash, pruritus, abrasions  Neurologic: Alert and oriented x4  Psychiatric: No anxiety, depression    Medical, surgical, family, and social histories were reviewed      EXAM:   VITALS:   Vitals:    01/30/25 1201   BP: 132/72   Pulse:    Resp:    Temp:       GENERAL: well developed, well nourished, in no apparent distress  SKIN: no rashes, no suspicious lesions: Cool and Dry  HEENT: atraumatic, normocephalic, ears and throat are clear    Ears: TM's clear and visible bilaterally, no excess cerumen or erythema.   EYES: Pupils equal round and reactive.  Extraocular motions intact no scleral icterus no injection or drainage  THROAT without erythema tonsillar hypertrophy or exudate.  Uvula midline airway patent  NECK: Given midline.  No JVD or lymphadenopathy supple nontender no meningeal signs   LUNGS: clear to auscultation sounds equal bilaterally no wheezes rales or rhonchi  CARDIO: Regular rate and rhythm without murmurs gallops or rubs  GI: Soft nontender nondistended no hepatomegaly palpable masses.  No guarding.   without deformity or crepitus no flank tenderness  EXTREMITIES: no cyanosis, clubbing or edema no joint tenderness effusion or edema noted.  No calf tenderness negative Homans' sign  bilaterally  NEURO: Awake and alert.  Cranial nerves II through XII intact motor and sensory grossly within normal limits.  5 out of 5 muscle strength in all muscle groups.  Normal speech.  PSYCHIATRIC: Awake, alert and oriented x3, cooperative appropriate mood and affect.  Judgment intact       ASSESSMENT AND PLAN:   1. Physical exam  I did discuss with the patient at this time I suggest updating blood work she need a CBC CMP free T4 free T3 TSH as well as lipid panel she was asked to continue to take her medications as prescribed    2.  Hypertension    Patient's blood pressure is controlled at this time she was asked to continue with the lisinopril hydrochlorothiazide as well as amlodipine had any headaches or chest pain.        2. Hypercholesteremia  - CBC With Differential With Platelet  - Comp Metabolic Panel (14)  - Lipid Panel  - TSH and Free T4  - Triiodothyronine (T3) Total  I did discuss with the patient we will be updating lipid panel she was asked to continue with the atorvastatin as well as to watch her diet.  3. Elevated TSH  - CBC With Differential With Platelet  - Comp Metabolic Panel (14)  - Lipid Panel  - TSH and Free T4  - Triiodothyronine (T3) Total  Patient previously had an elevated TSH we will be updating this today  4. Generalized anxiety disorder  She has not had any increased anxiety symptoms she is currently on Celexa as well as Xanax    Meds & Refills for this Visit:  Requested Prescriptions      No prescriptions requested or ordered in this encounter       Health Maintenance:  Health Maintenance Due   Topic Date Due    Pneumococcal Vaccine: 50+ Years (1 of 2 - PCV) Never done    Annual Physical  07/13/2024    COVID-19 Vaccine (3 - 2024-25 season) 09/01/2024    Influenza Vaccine (1) 10/01/2024       Patient/Caregiver Education: Patient/Caregiver Education: There are no barriers to learning. Medical education done.   Outcome: Patient verbalizes understanding. Patient is notified to call  with any questions, complications, allergies, or worsening or changing symptoms.  Patient is to call with any side effects or complications from the treatments as a result of today.     Problem List:  Patient Active Problem List   Diagnosis    Depression    Overweight(278.02)    Generalized anxiety disorder    Benign essential HTN    Primary osteoarthritis of right knee    History of cervical dysplasia    Hypercholesteremia    Malignant neoplasm of upper-inner quadrant of left breast in female, estrogen receptor positive (HCC)    Glucose intolerance         No follow-ups on file.     Nj So MD    Please note that portions of this note may have been completed with a voice recognition program. Efforts were made to edit the dictations but occasionally words are mis-transcribed.       [1] No Known Allergies

## 2025-02-05 ENCOUNTER — MED REC SCAN ONLY (OUTPATIENT)
Dept: FAMILY MEDICINE CLINIC | Facility: CLINIC | Age: 65
End: 2025-02-05

## 2025-02-10 ENCOUNTER — TELEPHONE (OUTPATIENT)
Age: 65
End: 2025-02-10

## 2025-02-15 LAB
ABSOLUTE BASOPHILS: 38 CELLS/UL (ref 0–200)
ABSOLUTE EOSINOPHILS: 148 CELLS/UL (ref 15–500)
ABSOLUTE LYMPHOCYTES: 889 CELLS/UL (ref 850–3900)
ABSOLUTE MONOCYTES: 338 CELLS/UL (ref 200–950)
ABSOLUTE NEUTROPHILS: 2386 CELLS/UL (ref 1500–7800)
ALBUMIN/GLOBULIN RATIO: 1.7 (CALC) (ref 1–2.5)
ALBUMIN: 4.5 G/DL (ref 3.6–5.1)
ALKALINE PHOSPHATASE: 115 U/L (ref 37–153)
ALT: 29 U/L (ref 6–29)
AST: 23 U/L (ref 10–35)
BASOPHILS: 1 %
BILIRUBIN, TOTAL: 0.7 MG/DL (ref 0.2–1.2)
BUN/CREATININE RATIO: 27 (CALC) (ref 6–22)
BUN: 13 MG/DL (ref 7–25)
CALCIUM: 9.9 MG/DL (ref 8.6–10.4)
CARBON DIOXIDE: 25 MMOL/L (ref 20–32)
CHLORIDE: 105 MMOL/L (ref 98–110)
CHOL/HDLC RATIO: 3 (CALC)
CHOLESTEROL, TOTAL: 187 MG/DL
CREATININE: 0.48 MG/DL (ref 0.5–1.05)
EGFR: 107 ML/MIN/1.73M2
EOSINOPHILS: 3.9 %
GLOBULIN: 2.7 G/DL (CALC) (ref 1.9–3.7)
GLUCOSE: 107 MG/DL (ref 65–99)
HDL CHOLESTEROL: 63 MG/DL
HEMATOCRIT: 39.9 % (ref 35–45)
HEMOGLOBIN: 13 G/DL (ref 11.7–15.5)
LDL-CHOLESTEROL: 100 MG/DL (CALC)
LYMPHOCYTES: 23.4 %
MCH: 31 PG (ref 27–33)
MCHC: 32.6 G/DL (ref 32–36)
MCV: 95.2 FL (ref 80–100)
MONOCYTES: 8.9 %
MPV: 9.6 FL (ref 7.5–12.5)
NEUTROPHILS: 62.8 %
NON-HDL CHOLESTEROL: 124 MG/DL (CALC)
PLATELET COUNT: 250 THOUSAND/UL (ref 140–400)
POTASSIUM: 4.2 MMOL/L (ref 3.5–5.3)
PROTEIN, TOTAL: 7.2 G/DL (ref 6.1–8.1)
RDW: 12.3 % (ref 11–15)
RED BLOOD CELL COUNT: 4.19 MILLION/UL (ref 3.8–5.1)
SODIUM: 141 MMOL/L (ref 135–146)
T3, TOTAL: 105 NG/DL (ref 76–181)
T4, FREE: 1 NG/DL (ref 0.8–1.8)
TRIGLYCERIDES: 140 MG/DL
TSH: 4.53 MIU/L (ref 0.4–4.5)
WHITE BLOOD CELL COUNT: 3.8 THOUSAND/UL (ref 3.8–10.8)

## 2025-02-17 DIAGNOSIS — R79.89 ELEVATED TSH: Primary | ICD-10-CM

## 2025-03-04 ENCOUNTER — HOSPITAL ENCOUNTER (OUTPATIENT)
Dept: RADIATION ONCOLOGY | Facility: HOSPITAL | Age: 65
Discharge: HOME | End: 2025-03-04
Attending: INTERNAL MEDICINE
Payer: COMMERCIAL

## 2025-03-04 VITALS
SYSTOLIC BLOOD PRESSURE: 178 MMHG | RESPIRATION RATE: 16 BRPM | WEIGHT: 260.81 LBS | DIASTOLIC BLOOD PRESSURE: 96 MMHG | BODY MASS INDEX: 40 KG/M2 | HEART RATE: 83 BPM | OXYGEN SATURATION: 95 %

## 2025-03-04 DIAGNOSIS — C50.212 MALIGNANT NEOPLASM OF UPPER-INNER QUADRANT OF LEFT BREAST IN FEMALE, ESTROGEN RECEPTOR POSITIVE (HCC): Primary | ICD-10-CM

## 2025-03-04 DIAGNOSIS — Z17.0 MALIGNANT NEOPLASM OF UPPER-INNER QUADRANT OF LEFT BREAST IN FEMALE, ESTROGEN RECEPTOR POSITIVE (HCC): Primary | ICD-10-CM

## 2025-03-04 NOTE — PROGRESS NOTES
Nursing Follow-Up Note    Patient: Claudia Paniagua  YOB: 1960  Age: 64 year old  Radiation Oncologist: Dr. Alissa Hernández  Referring Physician: No ref. provider found  Chief Complaint:   Chief Complaint   Patient presents with    Follow - Up     LEFT BREAST CA     Date: 3/4/2025    Toxicities: N/A    Vital Signs: BP (!) 178/96   Pulse 83   Resp 16   Wt 118.3 kg (260 lb 12.8 oz)   SpO2 95%   BMI 39.65 kg/m² ,   Wt Readings from Last 6 Encounters:   03/04/25 118.3 kg (260 lb 12.8 oz)   01/30/25 118.4 kg (261 lb)   09/24/24 116 kg (255 lb 12.8 oz)   09/12/24 117.5 kg (259 lb)   05/14/24 114.9 kg (253 lb 6.4 oz)   03/18/24 114.8 kg (253 lb)       Allergies:  Allergies[1]    Nursing Note: Pt completed RT to left breast and supraclavicular fossa on 3/6/24. Had bilat mammo done in Sept, noted new calcs in L breast. Biopsy done on 9/16/24 showed benign findings. Followed up with Dr. Levy, next mammo scheduled for 3/27. On Letrozole, has not seen Dr. Pittman. Tolerating Letrozole with minimal hot flashes. Denies breast pain or edema or hyperpigmentation to site. Full ROM to LUE.         [1] No Known Allergies

## 2025-03-04 NOTE — PATIENT INSTRUCTIONS
- WE WILL CALL TO SCHEDULE YOUR FOLLOW-UP APPOINTMENT WITH DR. PERALTA    - CALL TO MAKE FOLLOW-UP APPOINTMENT WITH DR. VELIZ    - CALL [phone] IF YOU HAVE ANY QUESTIONS/CONCERNS REGARDING RADIATION THERAPY

## 2025-03-10 RX ORDER — CITALOPRAM HYDROBROMIDE 20 MG/1
TABLET ORAL
Qty: 90 TABLET | Refills: 0 | Status: SHIPPED | OUTPATIENT
Start: 2025-03-10

## 2025-03-10 RX ORDER — ATORVASTATIN CALCIUM 10 MG/1
10 TABLET, FILM COATED ORAL NIGHTLY
Qty: 90 TABLET | Refills: 0 | Status: SHIPPED | OUTPATIENT
Start: 2025-03-10

## 2025-03-10 RX ORDER — AMLODIPINE BESYLATE 5 MG/1
7.5 TABLET ORAL DAILY
Qty: 135 TABLET | Refills: 0 | Status: SHIPPED | OUTPATIENT
Start: 2025-03-10

## 2025-03-14 RX ORDER — LISINOPRIL AND HYDROCHLOROTHIAZIDE 20; 25 MG/1; MG/1
1 TABLET ORAL DAILY
Qty: 90 TABLET | Refills: 0 | Status: SHIPPED | OUTPATIENT
Start: 2025-03-14

## 2025-03-14 RX ORDER — LISINOPRIL 20 MG/1
20 TABLET ORAL DAILY
Qty: 90 TABLET | Refills: 0 | Status: SHIPPED | OUTPATIENT
Start: 2025-03-14

## 2025-03-27 ENCOUNTER — HOSPITAL ENCOUNTER (OUTPATIENT)
Dept: MAMMOGRAPHY | Facility: HOSPITAL | Age: 65
Discharge: HOME | End: 2025-03-27
Attending: SURGERY
Payer: COMMERCIAL

## 2025-03-27 DIAGNOSIS — Z17.0 MALIGNANT NEOPLASM OF UPPER-INNER QUADRANT OF LEFT BREAST IN FEMALE, ESTROGEN RECEPTOR POSITIVE (HCC): ICD-10-CM

## 2025-03-27 DIAGNOSIS — C50.212 MALIGNANT NEOPLASM OF UPPER-INNER QUADRANT OF LEFT BREAST IN FEMALE, ESTROGEN RECEPTOR POSITIVE (HCC): ICD-10-CM

## 2025-03-27 PROCEDURE — 77061 BREAST TOMOSYNTHESIS UNI: CPT | Performed by: SURGERY

## 2025-03-27 PROCEDURE — 77065 DX MAMMO INCL CAD UNI: CPT | Performed by: SURGERY

## 2025-03-27 NOTE — PROGRESS NOTES
[facility]  Radiation Oncology Follow-up    Patient Name: Claudia Paniagua   MRN: [id number]  Referring Physician: Juancarlos Pittman MD; Jimena Levy MD     Diagnosis: left breast IDC, grade 2, ER/CA+, HER2-, pT2(m) (3.8 cm) N1mi (1/1)     Oncologic History  11/14/23: L breast lumpectomy, L breast excisions, R breast excision, and L SLNB.   Oncotype 20, no chemo, AI alone.  1/25/24 - 3/6/24: adjuvant RT to the L breast and draining lymphatics 5000 cGy in 25 fractions, L breast boost 1000 cGy in 5 fractions (all DIBH).   Endocrine therapy     Cancer Screening  -PAP: July 2023  -Colonoscopy: 2016, on 10 yr schedule     Interval History:  The patient is a 64 year old female with above diagnosis and treatment rendered who presents today for follow-up.    Last seen July 2024 for breast lymphedema  Did lymphedema therapy with minimal improvement  Wearing compression bra    9/9/24 had bl diagnostic mammogram with developing calcifications at 2:00 in the L breast  This prompted bx on 9/16/24 which showed benign findings  Will have imaging in March      Medications  Current Outpatient Medications   Medication Sig Dispense Refill    ALPRAZolam 0.25 MG Oral Tab TAKE 1 TABLET BY MOUTH TWICE DAILY AS NEEDED FOR ANXIETY NO DRIVING AND NO ALCOHOL 30 tablet 1    letrozole 2.5 MG Oral Tab Take 1 tablet (2.5 mg total) by mouth daily. 90 tablet 3    Multiple Vitamins-Minerals (MULTIVITAMIN OR) Take  by mouth.      LISINOPRIL-HYDROCHLOROTHIAZIDE 20-25 MG Oral Tab TAKE 1 TABLET BY MOUTH EVERY DAY 90 tablet 0    LISINOPRIL 20 MG Oral Tab TAKE 1 TABLET BY MOUTH EVERY DAY 90 tablet 0    AMLODIPINE 5 MG Oral Tab TAKE 1 AND 1/2 TABLET BY MOUTH DAILY 135 tablet 0    CITALOPRAM 20 MG Oral Tab TAKE 1 TABLET BY MOUTH EVERY DAY 90 tablet 0    ATORVASTATIN 10 MG Oral Tab TAKE 1 TABLET BY MOUTH EVERY DAY AT NIGHT 90 tablet 0    Mometasone Furoate 0.1 % External Cream Apply BID during radiation therapy. 50g is a 2 week supply.  (Patient not taking: Reported on 3/4/2025) 50 g 5    B Complex-C-E-Zn (BEC/ZINC) Oral Tab Take 1 tablet by mouth daily. (Patient not taking: Reported on 3/4/2025)      aspirin 81 MG Oral Tab EC Take 1 tablet (81 mg total) by mouth daily. (Patient not taking: Reported on 3/4/2025)      Omega-3 Fatty Acids (FISH OIL BURP-LESS OR) Take by mouth. (Patient not taking: Reported on 3/4/2025)      Vitamin D3 1000 units Oral Tab Take 1 tablet (1,000 Units total) by mouth daily. Takes 4,000 IU by mouth daily (Patient not taking: Reported on 3/4/2025)      Acetaminophen  MG Oral Tab CR Take 1 tablet (650 mg total) by mouth every 8 (eight) hours as needed for Pain. Two tabs daily (Patient not taking: Reported on 3/4/2025)         Physical Exam  Vitals:    25 1330   BP: (!) 178/96   Pulse: 83   Resp:        ECO    Gen: NAD  HEENT: NCAT, EOMI  Neck: no LAD  CV: RRR  Lungs: CTAB  Ext: wwp  Neuro: CN II-XII grossly intact    Breast Exam  -Right breast: normal contours, no skin changes, no dimpling, no nipple changes, no discharge or erythema, no edema, no masses. Right axilla without edema, masses, or LAD.   -Left breast: volume loss and fibrosis, no masses or nipple abnormalities. Left axilla without edema, masses, or LAD.      Imaging: see above    Assessment: 64 year old female with imaging detected L breast cancer. She underwent margin negative lumpectomy and SLNB showing IDC, grade 2, ER/MT+, HER2-, pT2m N1mi disease with close margins for DCIS and extensive LVSI. Chemo was not recommended and she competed comprehensive L breast and drake RT in 2024. She is doing well on AI, ROBERTO.     Plan:     -cont AI per medonc  -RTC 1 year or sooner if needed  -encouraged maintenance PT/OT exercises    Alissa Hernández MD  Radiation Oncology    [facility] low including chart review, exam, and time spent with the patient in counseling.

## 2025-05-15 ENCOUNTER — HOSPITAL ENCOUNTER (OUTPATIENT)
Dept: MRI IMAGING | Facility: HOSPITAL | Age: 65
Discharge: HOME | End: 2025-05-15
Attending: SURGERY
Payer: COMMERCIAL

## 2025-05-15 DIAGNOSIS — Z17.0 MALIGNANT NEOPLASM OF UPPER-INNER QUADRANT OF LEFT BREAST IN FEMALE, ESTROGEN RECEPTOR POSITIVE (HCC): ICD-10-CM

## 2025-05-15 DIAGNOSIS — C50.212 MALIGNANT NEOPLASM OF UPPER-INNER QUADRANT OF LEFT BREAST IN FEMALE, ESTROGEN RECEPTOR POSITIVE (HCC): ICD-10-CM

## 2025-05-15 PROCEDURE — A9575 INJ GADOTERATE MEGLUMI 0.1ML: HCPCS | Performed by: SURGERY

## 2025-05-15 PROCEDURE — 77049 MRI BREAST C-+ W/CAD BI: CPT | Performed by: SURGERY

## 2025-05-15 RX ORDER — GADOTERATE MEGLUMINE 376.9 MG/ML
20 INJECTION INTRAVENOUS
Status: COMPLETED | OUTPATIENT
Start: 2025-05-15 | End: 2025-05-15

## 2025-05-15 RX ADMIN — GADOTERATE MEGLUMINE 20 ML: 376.9 INJECTION INTRAVENOUS at 17:53:00

## 2025-06-06 ENCOUNTER — TELEPHONE (OUTPATIENT)
Dept: FAMILY MEDICINE CLINIC | Facility: CLINIC | Age: 65
End: 2025-06-06

## 2025-06-06 LAB
T3, TOTAL: 103 NG/DL (ref 76–181)
T4, FREE: 0.9 NG/DL (ref 0.8–1.8)
TSH: 3.48 MIU/L (ref 0.4–4.5)

## 2025-06-06 NOTE — TELEPHONE ENCOUNTER
Patient scheduled preop visit for Aug 2025 for knee replacement surgery. I spoke to patient and advised we need preop ppw first before we can schedule any appointments. Patient was driving and requested office fax number to be sent via .

## 2025-06-10 RX ORDER — ATORVASTATIN CALCIUM 10 MG/1
10 TABLET, FILM COATED ORAL NIGHTLY
Qty: 90 TABLET | Refills: 0 | Status: SHIPPED | OUTPATIENT
Start: 2025-06-10

## 2025-06-10 RX ORDER — CITALOPRAM HYDROBROMIDE 20 MG/1
20 TABLET ORAL DAILY
Qty: 90 TABLET | Refills: 0 | Status: SHIPPED | OUTPATIENT
Start: 2025-06-10

## 2025-06-10 RX ORDER — AMLODIPINE BESYLATE 5 MG/1
7.5 TABLET ORAL DAILY
Qty: 135 TABLET | Refills: 0 | Status: SHIPPED | OUTPATIENT
Start: 2025-06-10

## 2025-06-10 NOTE — TELEPHONE ENCOUNTER
Requested Prescriptions     Pending Prescriptions Disp Refills    CITALOPRAM 20 MG Oral Tab [Pharmacy Med Name: CITALOPRAM HBR 20 MG TABLET] 90 tablet 0     Sig: TAKE 1 TABLET BY MOUTH EVERY DAY    ATORVASTATIN 10 MG Oral Tab [Pharmacy Med Name: ATORVASTATIN 10 MG TABLET] 90 tablet 0     Sig: TAKE 1 TABLET BY MOUTH EVERY DAY AT NIGHT    AMLODIPINE 5 MG Oral Tab [Pharmacy Med Name: AMLODIPINE BESYLATE 5 MG TAB] 135 tablet 0     Sig: TAKE 1 AND 1/2 TABLETS DAILY BY MOUTH     Last refill x 90 days on 3/10/25  LOV 1/30/25  Rtc 6 months  No future appointments.    Hypertension Medications Protocol [id number]/2025 07:13 AM   Protocol Details Last BP reading less than 140/90    CMP or BMP in past 12 months    In person appointment or virtual visit in the past 12 mos or appointment in next 3 mos    EGFRCR or GFRNAA > 50    Medication is active on med list

## 2025-06-16 RX ORDER — LISINOPRIL AND HYDROCHLOROTHIAZIDE 20; 25 MG/1; MG/1
1 TABLET ORAL DAILY
Qty: 90 TABLET | Refills: 0 | Status: SHIPPED | OUTPATIENT
Start: 2025-06-16

## 2025-06-16 RX ORDER — LISINOPRIL 20 MG/1
20 TABLET ORAL DAILY
Qty: 90 TABLET | Refills: 0 | Status: SHIPPED | OUTPATIENT
Start: 2025-06-16

## 2025-06-16 NOTE — TELEPHONE ENCOUNTER
Last office visit: 1/30/25  Next office visit: N/A  Last Refill: 3/14/25    Please advise if patient is to be taking both Lisinopril hydrochlorothiazide 20-25 MG  and Lisinopril 20 MG

## 2025-07-28 ENCOUNTER — TELEPHONE (OUTPATIENT)
Dept: FAMILY MEDICINE CLINIC | Facility: CLINIC | Age: 65
End: 2025-07-28

## 2025-07-28 DIAGNOSIS — Z01.818 PREOP TESTING: Primary | ICD-10-CM

## 2025-08-01 ENCOUNTER — LAB ENCOUNTER (OUTPATIENT)
Dept: LAB | Age: 65
End: 2025-08-01
Attending: FAMILY MEDICINE

## 2025-08-01 ENCOUNTER — EKG ENCOUNTER (OUTPATIENT)
Dept: LAB | Age: 65
End: 2025-08-01
Attending: FAMILY MEDICINE

## 2025-08-01 ENCOUNTER — OFFICE VISIT (OUTPATIENT)
Dept: FAMILY MEDICINE CLINIC | Facility: CLINIC | Age: 65
End: 2025-08-01

## 2025-08-01 VITALS
SYSTOLIC BLOOD PRESSURE: 124 MMHG | HEART RATE: 89 BPM | HEIGHT: 68 IN | WEIGHT: 255 LBS | TEMPERATURE: 98 F | RESPIRATION RATE: 18 BRPM | BODY MASS INDEX: 38.65 KG/M2 | DIASTOLIC BLOOD PRESSURE: 68 MMHG

## 2025-08-01 DIAGNOSIS — Z01.818 PREOP TESTING: ICD-10-CM

## 2025-08-01 DIAGNOSIS — Z01.818 PREOP GENERAL PHYSICAL EXAM: Primary | ICD-10-CM

## 2025-08-01 DIAGNOSIS — F41.9 ANXIETY: ICD-10-CM

## 2025-08-01 LAB
ALBUMIN SERPL-MCNC: 4.8 G/DL (ref 3.2–4.8)
ALBUMIN/GLOB SERPL: 1.8 (ref 1–2)
ALP LIVER SERPL-CCNC: 110 U/L (ref 50–130)
ALT SERPL-CCNC: 51 U/L (ref 10–49)
ANION GAP SERPL CALC-SCNC: 10 MMOL/L (ref 0–18)
AST SERPL-CCNC: 44 U/L (ref ?–34)
ATRIAL RATE: 79 BPM
BASOPHILS # BLD AUTO: 0.05 X10(3) UL (ref 0–0.2)
BASOPHILS NFR BLD AUTO: 0.8 %
BILIRUB SERPL-MCNC: 1.2 MG/DL (ref 0.2–1.1)
BUN BLD-MCNC: 16 MG/DL (ref 9–23)
CALCIUM BLD-MCNC: 10.3 MG/DL (ref 8.7–10.6)
CHLORIDE SERPL-SCNC: 103 MMOL/L (ref 98–112)
CO2 SERPL-SCNC: 28 MMOL/L (ref 21–32)
CREAT BLD-MCNC: 0.87 MG/DL (ref 0.55–1.02)
EGFRCR SERPLBLD CKD-EPI 2021: 75 ML/MIN/1.73M2 (ref 60–?)
EOSINOPHIL # BLD AUTO: 0.11 X10(3) UL (ref 0–0.7)
EOSINOPHIL NFR BLD AUTO: 1.7 %
ERYTHROCYTE [DISTWIDTH] IN BLOOD BY AUTOMATED COUNT: 12.6 %
FASTING STATUS PATIENT QL REPORTED: NO
GLOBULIN PLAS-MCNC: 2.7 G/DL (ref 2–3.5)
GLUCOSE BLD-MCNC: 108 MG/DL (ref 70–99)
HCT VFR BLD AUTO: 39.4 % (ref 35–48)
HGB BLD-MCNC: 13.4 G/DL (ref 12–16)
IMM GRANULOCYTES # BLD AUTO: 0.01 X10(3) UL (ref 0–1)
IMM GRANULOCYTES NFR BLD: 0.2 %
LYMPHOCYTES # BLD AUTO: 1.12 X10(3) UL (ref 1–4)
LYMPHOCYTES NFR BLD AUTO: 17.8 %
MCH RBC QN AUTO: 31.1 PG (ref 26–34)
MCHC RBC AUTO-ENTMCNC: 34 G/DL (ref 31–37)
MCV RBC AUTO: 91.4 FL (ref 80–100)
MONOCYTES # BLD AUTO: 0.55 X10(3) UL (ref 0.1–1)
MONOCYTES NFR BLD AUTO: 8.7 %
NEUTROPHILS # BLD AUTO: 4.46 X10 (3) UL (ref 1.5–7.7)
NEUTROPHILS # BLD AUTO: 4.46 X10(3) UL (ref 1.5–7.7)
NEUTROPHILS NFR BLD AUTO: 70.8 %
OSMOLALITY SERPL CALC.SUM OF ELEC: 294 MOSM/KG (ref 275–295)
P AXIS: 48 DEGREES
P-R INTERVAL: 166 MS
PLATELET # BLD AUTO: 272 10(3)UL (ref 150–450)
POTASSIUM SERPL-SCNC: 4 MMOL/L (ref 3.5–5.1)
PROT SERPL-MCNC: 7.5 G/DL (ref 5.7–8.2)
Q-T INTERVAL: 386 MS
QRS DURATION: 92 MS
QTC CALCULATION (BEZET): 442 MS
R AXIS: 5 DEGREES
RBC # BLD AUTO: 4.31 X10(6)UL (ref 3.8–5.3)
SODIUM SERPL-SCNC: 141 MMOL/L (ref 136–145)
T AXIS: 27 DEGREES
T4 FREE SERPL-MCNC: 1.2 NG/DL (ref 0.8–1.7)
TSI SER-ACNC: 3.35 UIU/ML (ref 0.55–4.78)
VENTRICULAR RATE: 79 BPM
WBC # BLD AUTO: 6.3 X10(3) UL (ref 4–11)

## 2025-08-01 PROCEDURE — 84480 ASSAY TRIIODOTHYRONINE (T3): CPT | Performed by: FAMILY MEDICINE

## 2025-08-01 PROCEDURE — 84439 ASSAY OF FREE THYROXINE: CPT | Performed by: FAMILY MEDICINE

## 2025-08-01 PROCEDURE — 85025 COMPLETE CBC W/AUTO DIFF WBC: CPT | Performed by: FAMILY MEDICINE

## 2025-08-01 PROCEDURE — 93005 ELECTROCARDIOGRAM TRACING: CPT

## 2025-08-01 PROCEDURE — 93010 ELECTROCARDIOGRAM REPORT: CPT | Performed by: INTERNAL MEDICINE

## 2025-08-01 PROCEDURE — 80053 COMPREHEN METABOLIC PANEL: CPT | Performed by: FAMILY MEDICINE

## 2025-08-01 PROCEDURE — 84443 ASSAY THYROID STIM HORMONE: CPT | Performed by: FAMILY MEDICINE

## 2025-08-01 PROCEDURE — 36415 COLL VENOUS BLD VENIPUNCTURE: CPT | Performed by: FAMILY MEDICINE

## 2025-08-01 PROCEDURE — 99214 OFFICE O/P EST MOD 30 MIN: CPT | Performed by: FAMILY MEDICINE

## 2025-08-01 RX ORDER — ALPRAZOLAM 0.25 MG
TABLET ORAL
Qty: 10 TABLET | Refills: 0 | Status: SHIPPED | OUTPATIENT
Start: 2025-08-01

## (undated) DIAGNOSIS — Z12.83 SCREENING FOR SKIN CANCER: Primary | ICD-10-CM

## (undated) DEVICE — 3M™ STERI-STRIP™ REINFORCED ADHESIVE SKIN CLOSURES, R1547, 1/2 IN X 4 IN (12 MM X 100 MM), 6 STRIPS/ENVELOPE: Brand: 3M™ STERI-STRIP™

## (undated) DEVICE — DRAPE PACK CHEST

## (undated) DEVICE — 3M™ STERI-DRAPE™ INSTRUMENT POUCH 1018: Brand: STERI-DRAPE™

## (undated) DEVICE — GAUZE - RF AND X-RAY DETECTABLE USP TYPE VII, 16 PLY: Brand: SITUATE

## (undated) DEVICE — BRA SURGICAL ELIZABETH PINK XL

## (undated) DEVICE — STERILE POLYISOPRENE POWDER-FREE SURGICAL GLOVES: Brand: PROTEXIS

## (undated) DEVICE — SYRINGE MED 5ML STD CLR PLAS LL TIP N CTRL

## (undated) DEVICE — CLIP LIG M BLU TI HRT SHP WIRE HORZ

## (undated) DEVICE — #15 STERILE STAINLESS BLADE: Brand: STERILE STAINLESS BLADES

## (undated) DEVICE — SOLUTION IRRIG 1000ML 0.9% NACL USP BTL

## (undated) DEVICE — DRAPE,TAPE STRIPS,STERILE: Brand: MEDLINE

## (undated) DEVICE — CLIP SUR SM TI HRT SHP WIRE HORZ LIG SYS

## (undated) DEVICE — ADHESIVE LIQ 2/3ML VI MASTISOL

## (undated) DEVICE — CLEAR MONOFILAMENT (POLYDIOXANONE), ABSORBABLE SURGICAL SUTURE: Brand: PDS

## (undated) DEVICE — LIGHT HANDLE

## (undated) DEVICE — STANDARD HYPODERMIC NEEDLE,POLYPROPYLENE HUB: Brand: MONOJECT

## (undated) DEVICE — ELECTRODE ES L2.75IN XLN STD BLDE MOD E-Z CLN

## (undated) DEVICE — SUTURE VCRL SZ 3-0 L27IN ABSRB UD L26MM SH

## (undated) DEVICE — UNDERPAD INCONT W23XL36IN STD BLU POLYPR BK

## (undated) DEVICE — BREAST-HERNIA-PORT CDS-LF: Brand: MEDLINE INDUSTRIES, INC.

## (undated) DEVICE — SUTURE MCRYL SZ 4-0 L27IN ABSRB UD L19MM PS-2

## (undated) DEVICE — SLEEVE COMPR M KNEE LEN SGL USE KENDALL SCD

## (undated) DEVICE — PROVE COVER: Brand: UNBRANDED

## (undated) DEVICE — SUT SLK 2-0 18IN FS BK

## (undated) NOTE — LETTER
04/29/19          [address] [facility] [address]      Dear Zion Heller,     We are contacting you from Dr. Anupama Hayes office. Your health is important to us.  We have not received test results for additional tests that your

## (undated) NOTE — ED AVS SNAPSHOT
Candice Chaudhary   MRN: [id number]    Department:  [facility] Emergency Department   Date of Visit:  3/12/2019           Disclosure     Insurance plans vary and the physician(s) referred by the ER may not be covered by your plan.  Please contact tell this physician (or your personal doctor if your instructions are to return to your personal doctor) about any new or lasting problems. The primary care or specialist physician will see patients referred from the [facility] Emergency Department.  Danielle Velazco

## (undated) NOTE — LETTER
OUTSIDE TESTING RESULT REQUEST     IMPORTANT: FOR YOUR IMMEDIATE ATTENTION  Please FAX all test results listed below to: [phone]     Testing already done on or about: 2023     * * * * If testing is NOT complete, arrange with patient A.S.A.P. * * * *      Patient Name: Anna Hogue  Surgery Date: 2023  Medical Record: [id number]  CSN: [id number]  : 10/30/1960 - A: 66 y     Sex: female  Surgeon(s):  María Smith MD  Procedure: Left breast wire localized lumpectomy, left breast lymphoscintigraphy, left breast sentinel lymph node biopsy. Left breast 2 site wire localized excisional biopsy. Right breast wire localized excisional biopsy.   Anesthesia Type: General     Surgeon: María Smith MD     The following Testing and Time Line are REQUIRED PER ANESTHESIA     EKG READ AND SIGNED WITHIN   90 days  BMP (requires 4 hour fast) within  90 days      Thank You,   Sent by: Goldie Mcgowan

## (undated) NOTE — LETTER
To: Dr. Darleen Luo  Date:  2023  Patient Name: Martin MEDLEY-Age / Sex: 10/30/1960-A: 66 y  female  Medical Records: [id number]   CSN: [id number]      Clearance for Surgery Requested by Surgeon    Request for:  Medical Clearance    Requested by Surgeon: Rossy Jacob MD    Surgical Date: 2023    Procedure: Left breast wire localized lumpectomy, left breast lymphoscintigraphy, left breast sentinel lymph node biopsy. Left breast 2 site wire localized excisional biopsy. Right breast wire localized excisional biopsy. , Left      Please fax the clearance note to the [place]. Thank you.